# Patient Record
Sex: MALE | Race: WHITE | Employment: OTHER | URBAN - METROPOLITAN AREA
[De-identification: names, ages, dates, MRNs, and addresses within clinical notes are randomized per-mention and may not be internally consistent; named-entity substitution may affect disease eponyms.]

---

## 2017-01-09 ENCOUNTER — TELEPHONE (OUTPATIENT)
Dept: ENT CLINIC | Age: 82
End: 2017-01-09

## 2017-01-09 DIAGNOSIS — J01.80 ACUTE NON-RECURRENT SINUSITIS OF OTHER SINUS: Primary | ICD-10-CM

## 2017-01-09 RX ORDER — AMOXICILLIN 500 MG/1
500 CAPSULE ORAL 3 TIMES DAILY
Qty: 30 CAPSULE | Refills: 1 | Status: SHIPPED | OUTPATIENT
Start: 2017-01-09 | End: 2017-01-19

## 2017-01-23 ENCOUNTER — TELEPHONE (OUTPATIENT)
Dept: ENT CLINIC | Age: 82
End: 2017-01-23

## 2017-03-06 ENCOUNTER — OFFICE VISIT (OUTPATIENT)
Dept: DERMATOLOGY | Age: 82
End: 2017-03-06

## 2017-03-06 DIAGNOSIS — L57.0 AK (ACTINIC KERATOSIS): Primary | ICD-10-CM

## 2017-03-06 DIAGNOSIS — D69.2 SOLAR PURPURA (HCC): ICD-10-CM

## 2017-03-06 DIAGNOSIS — L85.9 HYPERKERATOSIS OF SOLE: ICD-10-CM

## 2017-03-06 PROCEDURE — 99213 OFFICE O/P EST LOW 20 MIN: CPT | Performed by: DERMATOLOGY

## 2017-03-06 PROCEDURE — 17003 DESTRUCT PREMALG LES 2-14: CPT | Performed by: DERMATOLOGY

## 2017-03-06 PROCEDURE — 17000 DESTRUCT PREMALG LESION: CPT | Performed by: DERMATOLOGY

## 2017-03-06 RX ORDER — SOLIFENACIN SUCCINATE 5 MG/1
5 TABLET, FILM COATED ORAL DAILY
COMMUNITY
End: 2017-11-20

## 2017-03-17 DIAGNOSIS — I10 ESSENTIAL HYPERTENSION, BENIGN: ICD-10-CM

## 2017-03-17 RX ORDER — LISINOPRIL 5 MG/1
TABLET ORAL
Qty: 90 TABLET | Refills: 1 | Status: SHIPPED | OUTPATIENT
Start: 2017-03-17 | End: 2017-07-03 | Stop reason: SDUPTHER

## 2017-05-04 ENCOUNTER — PATIENT MESSAGE (OUTPATIENT)
Dept: FAMILY MEDICINE CLINIC | Age: 82
End: 2017-05-04

## 2017-05-04 DIAGNOSIS — E78.00 PURE HYPERCHOLESTEROLEMIA: ICD-10-CM

## 2017-05-04 DIAGNOSIS — I10 ESSENTIAL HYPERTENSION, BENIGN: Primary | ICD-10-CM

## 2017-05-12 DIAGNOSIS — I10 ESSENTIAL HYPERTENSION, BENIGN: ICD-10-CM

## 2017-05-12 DIAGNOSIS — E78.00 PURE HYPERCHOLESTEROLEMIA: ICD-10-CM

## 2017-05-12 LAB
A/G RATIO: 1.6 (ref 1.1–2.2)
ALBUMIN SERPL-MCNC: 4.1 G/DL (ref 3.4–5)
ALP BLD-CCNC: 54 U/L (ref 40–129)
ALT SERPL-CCNC: 9 U/L (ref 10–40)
ANION GAP SERPL CALCULATED.3IONS-SCNC: 11 MMOL/L (ref 3–16)
AST SERPL-CCNC: 18 U/L (ref 15–37)
BILIRUB SERPL-MCNC: 0.6 MG/DL (ref 0–1)
BUN BLDV-MCNC: 19 MG/DL (ref 7–20)
CALCIUM SERPL-MCNC: 9.8 MG/DL (ref 8.3–10.6)
CHLORIDE BLD-SCNC: 104 MMOL/L (ref 99–110)
CHOLESTEROL, TOTAL: 180 MG/DL (ref 0–199)
CO2: 27 MMOL/L (ref 21–32)
CREAT SERPL-MCNC: 0.8 MG/DL (ref 0.8–1.3)
GFR AFRICAN AMERICAN: >60
GFR NON-AFRICAN AMERICAN: >60
GLOBULIN: 2.6 G/DL
GLUCOSE BLD-MCNC: 94 MG/DL (ref 70–99)
HDLC SERPL-MCNC: 75 MG/DL (ref 40–60)
LDL CHOLESTEROL CALCULATED: 85 MG/DL
POTASSIUM SERPL-SCNC: 4.6 MMOL/L (ref 3.5–5.1)
SODIUM BLD-SCNC: 142 MMOL/L (ref 136–145)
TOTAL PROTEIN: 6.7 G/DL (ref 6.4–8.2)
TRIGL SERPL-MCNC: 102 MG/DL (ref 0–150)
TSH REFLEX: 2.9 UIU/ML (ref 0.27–4.2)
VLDLC SERPL CALC-MCNC: 20 MG/DL

## 2017-06-09 ENCOUNTER — OFFICE VISIT (OUTPATIENT)
Dept: FAMILY MEDICINE CLINIC | Age: 82
End: 2017-06-09

## 2017-06-09 ENCOUNTER — HOSPITAL ENCOUNTER (OUTPATIENT)
Dept: OTHER | Age: 82
Discharge: HOME | End: 2017-06-09
Attending: FAMILY MEDICINE | Admitting: FAMILY MEDICINE

## 2017-06-09 VITALS
HEART RATE: 69 BPM | WEIGHT: 168.4 LBS | SYSTOLIC BLOOD PRESSURE: 133 MMHG | DIASTOLIC BLOOD PRESSURE: 70 MMHG | BODY MASS INDEX: 26.38 KG/M2 | RESPIRATION RATE: 16 BRPM | TEMPERATURE: 98.2 F

## 2017-06-09 DIAGNOSIS — S50.11XA CONTUSION OF RIGHT FOREARM, INITIAL ENCOUNTER: Primary | ICD-10-CM

## 2017-06-09 DIAGNOSIS — S50.11XA CONTUSION OF RIGHT FOREARM, INITIAL ENCOUNTER: ICD-10-CM

## 2017-06-09 PROCEDURE — 99213 OFFICE O/P EST LOW 20 MIN: CPT | Performed by: FAMILY MEDICINE

## 2017-06-30 DIAGNOSIS — I10 ESSENTIAL HYPERTENSION, BENIGN: ICD-10-CM

## 2017-07-03 RX ORDER — LISINOPRIL 5 MG/1
TABLET ORAL
Qty: 90 TABLET | Refills: 1 | Status: SHIPPED | OUTPATIENT
Start: 2017-07-03 | End: 2018-03-12 | Stop reason: SDUPTHER

## 2017-07-03 RX ORDER — SIMVASTATIN 20 MG
20 TABLET ORAL NIGHTLY
Qty: 90 TABLET | Refills: 1 | Status: SHIPPED | OUTPATIENT
Start: 2017-07-03 | End: 2018-01-11 | Stop reason: SDUPTHER

## 2017-09-12 ENCOUNTER — TELEPHONE (OUTPATIENT)
Dept: FAMILY MEDICINE CLINIC | Age: 82
End: 2017-09-12

## 2017-09-19 ENCOUNTER — NURSE ONLY (OUTPATIENT)
Dept: FAMILY MEDICINE CLINIC | Age: 82
End: 2017-09-19

## 2017-09-19 VITALS — TEMPERATURE: 98.3 F

## 2017-09-19 DIAGNOSIS — Z23 NEED FOR INFLUENZA VACCINATION: Primary | ICD-10-CM

## 2017-09-19 PROCEDURE — 90662 IIV NO PRSV INCREASED AG IM: CPT | Performed by: FAMILY MEDICINE

## 2017-09-19 PROCEDURE — G0008 ADMIN INFLUENZA VIRUS VAC: HCPCS | Performed by: FAMILY MEDICINE

## 2017-11-20 ENCOUNTER — OFFICE VISIT (OUTPATIENT)
Dept: FAMILY MEDICINE CLINIC | Age: 82
End: 2017-11-20

## 2017-11-20 VITALS
TEMPERATURE: 95.7 F | HEIGHT: 67 IN | OXYGEN SATURATION: 99 % | RESPIRATION RATE: 14 BRPM | WEIGHT: 170.6 LBS | SYSTOLIC BLOOD PRESSURE: 138 MMHG | DIASTOLIC BLOOD PRESSURE: 72 MMHG | BODY MASS INDEX: 26.78 KG/M2 | HEART RATE: 62 BPM

## 2017-11-20 DIAGNOSIS — I10 ESSENTIAL HYPERTENSION, BENIGN: ICD-10-CM

## 2017-11-20 DIAGNOSIS — E78.00 PURE HYPERCHOLESTEROLEMIA: ICD-10-CM

## 2017-11-20 DIAGNOSIS — N40.1 BENIGN NON-NODULAR PROSTATIC HYPERPLASIA WITH LOWER URINARY TRACT SYMPTOMS: ICD-10-CM

## 2017-11-20 DIAGNOSIS — N32.81 OVERACTIVE BLADDER: ICD-10-CM

## 2017-11-20 DIAGNOSIS — Z00.00 WELL ADULT EXAM: Primary | ICD-10-CM

## 2017-11-20 LAB
A/G RATIO: 1.6 (ref 1.1–2.2)
ALBUMIN SERPL-MCNC: 4.4 G/DL (ref 3.4–5)
ALP BLD-CCNC: 58 U/L (ref 40–129)
ALT SERPL-CCNC: 8 U/L (ref 10–40)
ANION GAP SERPL CALCULATED.3IONS-SCNC: 15 MMOL/L (ref 3–16)
AST SERPL-CCNC: 18 U/L (ref 15–37)
BILIRUB SERPL-MCNC: 0.8 MG/DL (ref 0–1)
BILIRUBIN, POC: NEGATIVE
BLOOD URINE, POC: ABNORMAL
BUN BLDV-MCNC: 13 MG/DL (ref 7–20)
CALCIUM SERPL-MCNC: 9.7 MG/DL (ref 8.3–10.6)
CHLORIDE BLD-SCNC: 102 MMOL/L (ref 99–110)
CHOLESTEROL, TOTAL: 176 MG/DL (ref 0–199)
CLARITY, POC: CLEAR
CO2: 25 MMOL/L (ref 21–32)
COLOR, POC: YELLOW
CREAT SERPL-MCNC: 0.9 MG/DL (ref 0.8–1.3)
GFR AFRICAN AMERICAN: >60
GFR NON-AFRICAN AMERICAN: >60
GLOBULIN: 2.7 G/DL
GLUCOSE BLD-MCNC: 92 MG/DL (ref 70–99)
GLUCOSE URINE, POC: NEGATIVE
HDLC SERPL-MCNC: 83 MG/DL (ref 40–60)
KETONES, POC: NEGATIVE
LDL CHOLESTEROL CALCULATED: 76 MG/DL
LEUKOCYTE EST, POC: NEGATIVE
NITRITE, POC: NEGATIVE
PH, POC: 7
POTASSIUM SERPL-SCNC: 4.8 MMOL/L (ref 3.5–5.1)
PROTEIN, POC: ABNORMAL
SODIUM BLD-SCNC: 142 MMOL/L (ref 136–145)
SPECIFIC GRAVITY, POC: 1.01
TOTAL PROTEIN: 7.1 G/DL (ref 6.4–8.2)
TRIGL SERPL-MCNC: 87 MG/DL (ref 0–150)
TSH REFLEX: 2.49 UIU/ML (ref 0.27–4.2)
UROBILINOGEN, POC: 0.2
VLDLC SERPL CALC-MCNC: 17 MG/DL

## 2017-11-20 PROCEDURE — 81002 URINALYSIS NONAUTO W/O SCOPE: CPT | Performed by: FAMILY MEDICINE

## 2017-11-20 PROCEDURE — G0439 PPPS, SUBSEQ VISIT: HCPCS | Performed by: FAMILY MEDICINE

## 2017-11-20 RX ORDER — TROSPIUM CHLORIDE 20 MG/1
20 TABLET, FILM COATED ORAL 2 TIMES DAILY
Qty: 180 TABLET | Refills: 0
Start: 2017-11-20 | End: 2018-03-12 | Stop reason: SDUPTHER

## 2017-11-20 ASSESSMENT — PATIENT HEALTH QUESTIONNAIRE - PHQ9
2. FEELING DOWN, DEPRESSED OR HOPELESS: 0
1. LITTLE INTEREST OR PLEASURE IN DOING THINGS: 0
SUM OF ALL RESPONSES TO PHQ9 QUESTIONS 1 & 2: 0
SUM OF ALL RESPONSES TO PHQ QUESTIONS 1-9: 0

## 2017-11-20 ASSESSMENT — ENCOUNTER SYMPTOMS: CONSTIPATION: 1

## 2017-11-20 NOTE — PROGRESS NOTES
05/12/2017    LDLCALC 57 05/24/2016    LDLCALC 70 11/10/2015     Lab Results   Component Value Date    LABVLDL 20 05/12/2017    LABVLDL 15 05/24/2016    LABVLDL 17 11/10/2015     No results found for: [facility]   Lab Results   Component Value Date    PSA 1.05 11/28/2012    PSADIA 1.13 12/02/2013      Objective:   Physical Exam   Constitutional: He appears well-developed and well-nourished. HENT:   Head: Normocephalic and atraumatic. Right Ear: Tympanic membrane, external ear and ear canal normal.   Left Ear: Tympanic membrane, external ear and ear canal normal.   Nose: Nose normal.   Mouth/Throat: Uvula is midline, oropharynx is clear and moist and mucous membranes are normal. Normal dentition. No oropharyngeal exudate. Eyes: Conjunctivae, EOM and lids are normal. Pupils are equal, round, and reactive to light. No scleral icterus. Fundoscopic exam:       The right eye shows no arteriolar narrowing and no AV nicking. The left eye shows no arteriolar narrowing and no AV nicking. Neck: Normal range of motion. Neck supple. Carotid bruit is not present. No tracheal deviation present. No thyroid mass and no thyromegaly present. Cardiovascular: Normal rate, regular rhythm, normal heart sounds and intact distal pulses. Exam reveals no gallop and no friction rub. No murmur heard. Pulses:       Carotid pulses are 2+ on the right side, and 2+ on the left side. Femoral pulses are 2+ on the right side, and 2+ on the left side. Dorsalis pedis pulses are 2+ on the right side, and 2+ on the left side. Posterior tibial pulses are 2+ on the right side, and 2+ on the left side. Pulmonary/Chest: Effort normal and breath sounds normal. Right breast exhibits no inverted nipple and no mass. Left breast exhibits no inverted nipple and no mass. Abdominal: Soft. Bowel sounds are normal. He exhibits no mass. There is no hepatosplenomegaly. There is no tenderness. No hernia.  Hernia confirmed negative in the ventral area, confirmed negative in the right inguinal area and confirmed negative in the left inguinal area. Genitourinary: Rectum normal, testes normal and penis normal. Prostate is enlarged. Prostate is not tender. Genitourinary Comments: No prostate mass   Lymphadenopathy:        Head (right side): No submandibular adenopathy present. Head (left side): No submandibular adenopathy present. He has no cervical adenopathy. Right: No inguinal and no supraclavicular adenopathy present. Left: No inguinal and no supraclavicular adenopathy present. Neurological: He is alert. He has normal strength. Gait normal.   Reflex Scores:       Patellar reflexes are 2+ on the right side and 2+ on the left side. Skin: Skin is warm and dry. No rash noted. No cyanosis. Nails show no clubbing. Psychiatric: He has a normal mood and affect. His behavior is normal. Judgment normal. Cognition and memory are normal.       Assessment:      1. Well adult exam  Exercise, diet and healthy weight addressed. The natural history of prostate cancer and ongoing controversy regarding screening and potential treatment outcomes of prostate cancer has been discussed with the patient. The meaning of a false positive PSA and a false negative PSA has been discussed. He indicates understanding of the limitations of this screening test and wishes  Not to proceed with screening PSA testing. DT every 10 years  Influenza vaccine annually     2. Essential hypertension, benign  At goal < 140/90   3. Pure hypercholesterolemia  At goal with statin. 4. Overactive bladder  Well controlled. 5. Benign non-nodular prostatic hyperplasia with lower urinary tract symptoms  POCT Urinalysis no Micro  Symptoms improved          Plan:      Side effects of current medications reviewed and questions answered. . Follow up in 1 year or prn.

## 2017-11-23 ENCOUNTER — PATIENT MESSAGE (OUTPATIENT)
Dept: FAMILY MEDICINE CLINIC | Age: 82
End: 2017-11-23

## 2017-11-23 DIAGNOSIS — K59.04 CHRONIC IDIOPATHIC CONSTIPATION: Primary | ICD-10-CM

## 2018-02-19 ENCOUNTER — OFFICE VISIT (OUTPATIENT)
Dept: ENT CLINIC | Age: 83
End: 2018-02-19

## 2018-02-19 VITALS
HEIGHT: 70 IN | HEART RATE: 80 BPM | TEMPERATURE: 96.4 F | WEIGHT: 164 LBS | SYSTOLIC BLOOD PRESSURE: 138 MMHG | BODY MASS INDEX: 23.48 KG/M2 | DIASTOLIC BLOOD PRESSURE: 76 MMHG

## 2018-02-19 DIAGNOSIS — J01.90 ACUTE RHINOSINUSITIS: Primary | ICD-10-CM

## 2018-02-19 PROCEDURE — 99213 OFFICE O/P EST LOW 20 MIN: CPT | Performed by: OTOLARYNGOLOGY

## 2018-02-19 RX ORDER — AMOXICILLIN 500 MG/1
500 CAPSULE ORAL 3 TIMES DAILY
Qty: 30 CAPSULE | Refills: 1 | Status: SHIPPED | OUTPATIENT
Start: 2018-02-19 | End: 2018-03-01

## 2018-02-19 NOTE — PROGRESS NOTES
FOLLOW UP VISIT:      INTERIM HISTORY: URI symptoms for 1-2 weeks. Has cough. Cough is productive of discolored mucus. A little blood this AM.  Has throat pain. Some nasal obstruction at night. Some right ear fullness. Hearing right ear is muffled. Seemed to be improving but now worse again. PAST MEDICAL HISTORY:   History   Smoking Status    Former Smoker    Years: 5.00    Types: Pipe    Quit date: 1/1/1965   Smokeless Tobacco    Never Used                                                    History   Alcohol Use    1.8 oz/week    3 Glasses of wine, 4 Standard drinks or equivalent per week     Comment: in a week                                                    Current Outpatient Prescriptions:     simvastatin (ZOCOR) 20 MG tablet, TAKE 1 TABLET NIGHTLY, Disp: 90 tablet, Rfl: 2    linaclotide (LINZESS) 145 MCG capsule, Take 1 capsule by mouth every morning (before breakfast), Disp: 30 capsule, Rfl: 5    trospium (SANCTURA) 20 MG tablet, Take 1 tablet by mouth 2 times daily, Disp: 180 tablet, Rfl: 0    lisinopril (PRINIVIL;ZESTRIL) 5 MG tablet, TAKE 1 TABLET DAILY, Disp: 90 tablet, Rfl: 1    clobetasol (TEMOVATE) 0.05 % cream, Apply topically 2 times daily. , Disp: 30 g, Rfl: 0    Calcium Citrate (CITRACAL PO), Take by mouth, Disp: , Rfl:     BIOTIN 5000 PO, Take by mouth, Disp: , Rfl:     finasteride (PROSCAR) 5 MG tablet, , Disp: , Rfl: 4    Urea (CARMOL) 40 % cream, Apply to the feet 1 to 2 times daily. , Disp: 85 g, Rfl: 2    docusate sodium (COLACE) 100 MG capsule, Take 100 mg by mouth 2 times daily. , Disp: , Rfl:     Calcium Carbonate-Vit D-Min (CALCIUM 1200 PO), Take 1 tablet by mouth Slow acting caltrate, Disp: , Rfl:                                                  Past Medical History:   Diagnosis Date    Arthritis     MILD    Bunion 7/29/2011    Burn     RT wrist    Cataract 5/12/2014    Essential hypertension, benign 12/2/2013    Hammer toe 7/29/2011    Hyperlipidemia  Inguinal hernia 7/29/2011                                                    Past Surgical History:   Procedure Laterality Date    CATARACT REMOVAL Bilateral May '14    DENTAL SURGERY      EYE SURGERY      HERNIA REPAIR  1980    Left inguinal    INGUINAL HERNIA REPAIR      LEFT    INGUINAL HERNIA REPAIR  08/24/2011    Right    VARICOSE VEIN SURGERY  2008    VARICOSE VEIN SURGERY      BILATERAL LEGS         REVIEW OF SYSTEMS:  Al pertinent positive and negative findings included in HPI. Otherwise, all other systems are reviewed and are negative    PHYSICAL EXAMINATION:   GENERAL: wdwn- no acute distress  COMMUNICATION :  Normal voice  MENTAL STATUS:  Normal  HEAD AND FACE:  Normal  EXTERNAL EARS AND NOSE:  Normal  FACIAL MUSCLES:  Normal  FACE PALPATION:  Negative  OTOSCOPY:  Tympanic membranes and middle ears normal.  TUNING FORKS:  Rinne ++ Rubio midline at 512 Hz  INTRANASAL:  Septum midline, turbinates normal, meati clear. Purulent secretions in nares  LIPS, TEETH, GINGIVA:  Normal mucosa  PHARYNX:  Normal  NECK:  No masses or adenopathy  SALIVARY GLANDS:  Normal  THYROID:  Normal    IMPRESSION: Acute rhinosinusitis. PLAN: Amoxicillin 500 mg 3 times a day and Medrol Dosepak prescribed. FOLLOW-UP: 10 days if symptoms do not resolve.

## 2018-03-08 ENCOUNTER — OFFICE VISIT (OUTPATIENT)
Dept: DERMATOLOGY | Age: 83
End: 2018-03-08

## 2018-03-08 DIAGNOSIS — L82.1 SK (SEBORRHEIC KERATOSIS): ICD-10-CM

## 2018-03-08 DIAGNOSIS — D69.2 SOLAR PURPURA (HCC): Primary | ICD-10-CM

## 2018-03-08 DIAGNOSIS — L57.0 AK (ACTINIC KERATOSIS): ICD-10-CM

## 2018-03-08 PROCEDURE — 17003 DESTRUCT PREMALG LES 2-14: CPT | Performed by: DERMATOLOGY

## 2018-03-08 PROCEDURE — 17000 DESTRUCT PREMALG LESION: CPT | Performed by: DERMATOLOGY

## 2018-03-08 PROCEDURE — 99213 OFFICE O/P EST LOW 20 MIN: CPT | Performed by: DERMATOLOGY

## 2018-03-12 DIAGNOSIS — I10 ESSENTIAL HYPERTENSION, BENIGN: ICD-10-CM

## 2018-03-12 RX ORDER — TROSPIUM CHLORIDE 20 MG/1
20 TABLET, FILM COATED ORAL 2 TIMES DAILY
Qty: 180 TABLET | Refills: 1 | Status: SHIPPED | OUTPATIENT
Start: 2018-03-12 | End: 2018-11-27 | Stop reason: ALTCHOICE

## 2018-03-12 RX ORDER — LISINOPRIL 5 MG/1
TABLET ORAL
Qty: 90 TABLET | Refills: 1 | Status: SHIPPED | OUTPATIENT
Start: 2018-03-12 | End: 2018-09-14 | Stop reason: SDUPTHER

## 2018-04-20 ENCOUNTER — PATIENT MESSAGE (OUTPATIENT)
Dept: FAMILY MEDICINE CLINIC | Age: 83
End: 2018-04-20

## 2018-04-20 RX ORDER — OXYBUTYNIN CHLORIDE 15 MG/1
15 TABLET, EXTENDED RELEASE ORAL DAILY
Qty: 30 TABLET | Refills: 5 | Status: SHIPPED | OUTPATIENT
Start: 2018-04-20 | End: 2018-08-11 | Stop reason: SDUPTHER

## 2018-05-12 ENCOUNTER — PATIENT MESSAGE (OUTPATIENT)
Dept: FAMILY MEDICINE CLINIC | Age: 83
End: 2018-05-12

## 2018-05-14 RX ORDER — FINASTERIDE 5 MG/1
5 TABLET, FILM COATED ORAL DAILY
Qty: 90 TABLET | Refills: 1 | Status: SHIPPED | OUTPATIENT
Start: 2018-05-14 | End: 2018-11-29 | Stop reason: SDUPTHER

## 2018-08-10 ENCOUNTER — PATIENT MESSAGE (OUTPATIENT)
Dept: FAMILY MEDICINE CLINIC | Age: 83
End: 2018-08-10

## 2018-08-10 DIAGNOSIS — K59.04 CHRONIC IDIOPATHIC CONSTIPATION: ICD-10-CM

## 2018-08-11 RX ORDER — OXYBUTYNIN CHLORIDE 15 MG/1
15 TABLET, EXTENDED RELEASE ORAL DAILY
Qty: 90 TABLET | Refills: 2 | Status: SHIPPED | OUTPATIENT
Start: 2018-08-11 | End: 2019-03-23 | Stop reason: SDUPTHER

## 2018-08-11 NOTE — TELEPHONE ENCOUNTER
From: Ines Garcia  To: Camacho Winters MD  Sent: 8/10/2018 3:15 PM EDT  Subject: Prescription Question    I need NEW prescriptions for 90 days from [facility] with refills for Oxybutynin ER 15 mg and Linzess 145 mg

## 2018-09-05 ENCOUNTER — TELEPHONE (OUTPATIENT)
Dept: FAMILY MEDICINE CLINIC | Age: 83
End: 2018-09-05

## 2018-09-05 DIAGNOSIS — I10 ESSENTIAL HYPERTENSION, BENIGN: Primary | ICD-10-CM

## 2018-09-05 DIAGNOSIS — E78.00 PURE HYPERCHOLESTEROLEMIA: ICD-10-CM

## 2018-09-05 NOTE — TELEPHONE ENCOUNTER
Pt scheduled annual physical with PCP on 11/27/18. He is requesting bloodwork orders drafted prior, and requested return call with update to [phone] when complete. Thank you!

## 2018-09-14 DIAGNOSIS — I10 ESSENTIAL HYPERTENSION, BENIGN: ICD-10-CM

## 2018-09-14 RX ORDER — LISINOPRIL 5 MG/1
TABLET ORAL
Qty: 90 TABLET | Refills: 0 | Status: SHIPPED | OUTPATIENT
Start: 2018-09-14 | End: 2019-01-14 | Stop reason: SDUPTHER

## 2018-10-29 RX ORDER — SIMVASTATIN 20 MG
20 TABLET ORAL NIGHTLY
Qty: 90 TABLET | Refills: 0 | Status: SHIPPED | OUTPATIENT
Start: 2018-10-29 | End: 2019-02-04 | Stop reason: SDUPTHER

## 2018-11-20 DIAGNOSIS — E78.00 PURE HYPERCHOLESTEROLEMIA: ICD-10-CM

## 2018-11-20 LAB
A/G RATIO: 1.7 (ref 1.1–2.2)
ALBUMIN SERPL-MCNC: 4.2 G/DL (ref 3.4–5)
ALP BLD-CCNC: 55 U/L (ref 40–129)
ALT SERPL-CCNC: 8 U/L (ref 10–40)
ANION GAP SERPL CALCULATED.3IONS-SCNC: 11 MMOL/L (ref 3–16)
AST SERPL-CCNC: 18 U/L (ref 15–37)
BILIRUB SERPL-MCNC: 0.8 MG/DL (ref 0–1)
BUN BLDV-MCNC: 15 MG/DL (ref 7–20)
CALCIUM SERPL-MCNC: 9.7 MG/DL (ref 8.3–10.6)
CHLORIDE BLD-SCNC: 107 MMOL/L (ref 99–110)
CHOLESTEROL, TOTAL: 160 MG/DL (ref 0–199)
CO2: 27 MMOL/L (ref 21–32)
CREAT SERPL-MCNC: 0.8 MG/DL (ref 0.8–1.3)
GFR AFRICAN AMERICAN: >60
GFR NON-AFRICAN AMERICAN: >60
GLOBULIN: 2.5 G/DL
GLUCOSE BLD-MCNC: 90 MG/DL (ref 70–99)
HDLC SERPL-MCNC: 70 MG/DL (ref 40–60)
LDL CHOLESTEROL CALCULATED: 70 MG/DL
POTASSIUM SERPL-SCNC: 4.5 MMOL/L (ref 3.5–5.1)
SODIUM BLD-SCNC: 145 MMOL/L (ref 136–145)
TOTAL PROTEIN: 6.7 G/DL (ref 6.4–8.2)
TRIGL SERPL-MCNC: 100 MG/DL (ref 0–150)
TSH REFLEX: 3.23 UIU/ML (ref 0.27–4.2)
VLDLC SERPL CALC-MCNC: 20 MG/DL

## 2018-11-27 ENCOUNTER — OFFICE VISIT (OUTPATIENT)
Dept: FAMILY MEDICINE CLINIC | Age: 83
End: 2018-11-27
Payer: MEDICARE

## 2018-11-27 VITALS
DIASTOLIC BLOOD PRESSURE: 78 MMHG | BODY MASS INDEX: 27.03 KG/M2 | OXYGEN SATURATION: 99 % | WEIGHT: 168.2 LBS | SYSTOLIC BLOOD PRESSURE: 140 MMHG | TEMPERATURE: 97.7 F | HEIGHT: 66 IN | HEART RATE: 78 BPM | RESPIRATION RATE: 14 BRPM

## 2018-11-27 DIAGNOSIS — I10 ESSENTIAL HYPERTENSION, BENIGN: ICD-10-CM

## 2018-11-27 DIAGNOSIS — K14.9 TONGUE IRRITATION: ICD-10-CM

## 2018-11-27 DIAGNOSIS — E78.00 PURE HYPERCHOLESTEROLEMIA: ICD-10-CM

## 2018-11-27 DIAGNOSIS — Z00.00 WELL ADULT EXAM: Primary | ICD-10-CM

## 2018-11-27 DIAGNOSIS — N40.1 BENIGN NON-NODULAR PROSTATIC HYPERPLASIA WITH LOWER URINARY TRACT SYMPTOMS: ICD-10-CM

## 2018-11-27 PROCEDURE — G0439 PPPS, SUBSEQ VISIT: HCPCS | Performed by: FAMILY MEDICINE

## 2018-11-27 RX ORDER — TAMSULOSIN HYDROCHLORIDE 0.4 MG/1
0.4 CAPSULE ORAL DAILY
Qty: 90 CAPSULE | Refills: 1 | Status: SHIPPED | OUTPATIENT
Start: 2018-11-27 | End: 2020-12-04

## 2018-11-27 RX ORDER — FLUTICASONE PROPIONATE 50 MCG
1 SPRAY, SUSPENSION (ML) NASAL DAILY
COMMUNITY
End: 2021-11-09

## 2018-11-27 ASSESSMENT — PATIENT HEALTH QUESTIONNAIRE - PHQ9
SUM OF ALL RESPONSES TO PHQ QUESTIONS 1-9: 0
1. LITTLE INTEREST OR PLEASURE IN DOING THINGS: 0
SUM OF ALL RESPONSES TO PHQ9 QUESTIONS 1 & 2: 0
SUM OF ALL RESPONSES TO PHQ QUESTIONS 1-9: 0
2. FEELING DOWN, DEPRESSED OR HOPELESS: 0

## 2018-11-27 ASSESSMENT — ENCOUNTER SYMPTOMS
CHEST TIGHTNESS: 0
ANAL BLEEDING: 0
TROUBLE SWALLOWING: 0
CONSTIPATION: 0
COUGH: 0
ABDOMINAL PAIN: 0
BLOOD IN STOOL: 0
SHORTNESS OF BREATH: 0
VOICE CHANGE: 0
RHINORRHEA: 0
DIARRHEA: 0

## 2018-11-27 NOTE — PROGRESS NOTES
Subjective:      Patient ID: Janki Yates 80 y.o. male     HPI    Janki Yates is here for his annual wellness exam.    Diet: eats a healthy diet  Exercise: bikes in the good weather. Goes to the exercise room where he lives sporadically. Walking 2 1/2 miles once a week. Sleeps well     Health Maintenance   Topic Date Due    Shingles Vaccine (1 of 2 - 2 Dose Series) 01/01/2008    Potassium monitoring  11/20/2019    Creatinine monitoring  11/20/2019    DTaP/Tdap/Td vaccine (2 - Td) 10/09/2026    Flu vaccine  Completed    Pneumococcal low/med risk  Completed       Outpatient Prescriptions Marked as Taking for the 11/27/18 encounter (Office Visit) with Main Monterroso MD   Medication Sig Dispense Refill    fluticasone (FLONASE) 50 MCG/ACT nasal spray 1 spray by Each Nare route daily      simvastatin (ZOCOR) 20 MG tablet Take 1 tablet by mouth nightly 90 tablet 0    lisinopril (PRINIVIL;ZESTRIL) 5 MG tablet TAKE 1 TABLET DAILY 90 tablet 0    linaclotide (LINZESS) 145 MCG capsule TAKE 1 CAPSULE BY MOUTH EVERY MORNING BEFORE BREAKFAST 90 capsule 2    oxybutynin (DITROPAN XL) 15 MG extended release tablet Take 1 tablet by mouth daily 90 tablet 2    finasteride (PROSCAR) 5 MG tablet Take 1 tablet by mouth daily 90 tablet 1    clobetasol (TEMOVATE) 0.05 % cream Apply topically 2 times daily.  30 g 0    BIOTIN 5000 PO Take by mouth          Allergies   Allergen Reactions    Percodan [Oxycodone-Aspirin] Nausea Only        Patient Active Problem List   Diagnosis    Overactive bladder    Pure hypercholesterolemia    Lichen planus    Essential hypertension, benign    Angioma    Dry mouth    Benign non-nodular prostatic hyperplasia with lower urinary tract symptoms    Eczema, dyshidrotic       Past Medical History:   Diagnosis Date    Arthritis     MILD    Bunion 7/29/2011    Burn     RT wrist    Cataract 5/12/2014    Essential hypertension, benign 12/2/2013    Hammer toe 7/29/2011    Hyperlipidemia     Inguinal hernia 7/29/2011       Past Surgical History:   Procedure Laterality Date    CATARACT REMOVAL Bilateral May '14    DENTAL SURGERY      EYE SURGERY      HERNIA REPAIR  1980    Left inguinal    INGUINAL HERNIA REPAIR      LEFT    INGUINAL HERNIA REPAIR  08/24/2011    Right    VARICOSE VEIN SURGERY  2008    VARICOSE VEIN SURGERY      BILATERAL LEGS        Social History   Substance Use Topics    Smoking status: Former Smoker     Years: 5.00     Types: Pipe     Quit date: 1/1/1965    Smokeless tobacco: Never Used    Alcohol use 1.8 oz/week     3 Glasses of wine, 4 Standard drinks or equivalent per week      Comment: in a week        Family History   Problem Relation Age of Onset    Breast Cancer Mother 52    Heart Attack Father 72    Lung Cancer Sister         Review of Systems  Review of Systems   Constitutional: Negative for fatigue and unexpected weight change. HENT: Positive for hearing loss and tinnitus. Negative for congestion, nosebleeds, rhinorrhea, trouble swallowing and voice change. 3 - 4 weeks sore area under his tongue in the back on the right. [place] with some foods such wine or if eats/drinks something very hot or very cold. Not bothering him today. Comes and goes. He thinks he may have bit his tongue. Tinnitus if has allergies. Hearing loss related to wax - Dr. Rafy Kemp cleared the wax and hearing loss resolved. Eyes: Negative for visual disturbance. Respiratory: Negative for cough, chest tightness and shortness of breath. Cardiovascular: Negative for chest pain, palpitations and leg swelling. Gastrointestinal: Negative for abdominal pain, anal bleeding, blood in stool, constipation and diarrhea. Constipation is well controlled with Linzess QOD. Drinking plenty of water helps. Endocrine: Negative for polydipsia and polyuria.    Genitourinary: Negative for difficulty urinating, discharge, dysuria, frequency, hematuria and

## 2018-11-30 RX ORDER — FINASTERIDE 5 MG/1
TABLET, FILM COATED ORAL
Qty: 90 TABLET | Refills: 1 | Status: SHIPPED | OUTPATIENT
Start: 2018-11-30 | End: 2019-06-18 | Stop reason: SDUPTHER

## 2019-01-14 DIAGNOSIS — I10 ESSENTIAL HYPERTENSION, BENIGN: ICD-10-CM

## 2019-01-14 RX ORDER — LISINOPRIL 5 MG/1
TABLET ORAL
Qty: 90 TABLET | Refills: 0 | Status: SHIPPED | OUTPATIENT
Start: 2019-01-14 | End: 2019-03-23 | Stop reason: SDUPTHER

## 2019-02-04 RX ORDER — SIMVASTATIN 20 MG
20 TABLET ORAL NIGHTLY
Qty: 90 TABLET | Refills: 1 | Status: SHIPPED | OUTPATIENT
Start: 2019-02-04 | End: 2019-09-03 | Stop reason: SDUPTHER

## 2019-03-05 ENCOUNTER — OFFICE VISIT (OUTPATIENT)
Dept: DERMATOLOGY | Age: 84
End: 2019-03-05
Payer: MEDICARE

## 2019-03-05 DIAGNOSIS — L57.0 ACTINIC KERATOSIS: ICD-10-CM

## 2019-03-05 DIAGNOSIS — L82.1 SK (SEBORRHEIC KERATOSIS): Primary | ICD-10-CM

## 2019-03-05 PROCEDURE — 99213 OFFICE O/P EST LOW 20 MIN: CPT | Performed by: DERMATOLOGY

## 2019-03-06 ENCOUNTER — PATIENT MESSAGE (OUTPATIENT)
Dept: DERMATOLOGY | Age: 84
End: 2019-03-06

## 2019-06-18 ENCOUNTER — OFFICE VISIT (OUTPATIENT)
Dept: FAMILY MEDICINE CLINIC | Age: 84
End: 2019-06-18
Payer: MEDICARE

## 2019-06-18 VITALS
OXYGEN SATURATION: 98 % | HEART RATE: 74 BPM | SYSTOLIC BLOOD PRESSURE: 131 MMHG | TEMPERATURE: 97.5 F | DIASTOLIC BLOOD PRESSURE: 74 MMHG | WEIGHT: 162.2 LBS | BODY MASS INDEX: 25.98 KG/M2

## 2019-06-18 DIAGNOSIS — J01.00 ACUTE NON-RECURRENT MAXILLARY SINUSITIS: ICD-10-CM

## 2019-06-18 DIAGNOSIS — R63.4 WEIGHT LOSS: ICD-10-CM

## 2019-06-18 DIAGNOSIS — R63.4 WEIGHT LOSS: Primary | ICD-10-CM

## 2019-06-18 DIAGNOSIS — N40.1 BENIGN NON-NODULAR PROSTATIC HYPERPLASIA WITH LOWER URINARY TRACT SYMPTOMS: ICD-10-CM

## 2019-06-18 LAB
A/G RATIO: 1.6 (ref 1.1–2.2)
ALBUMIN SERPL-MCNC: 4.1 G/DL (ref 3.4–5)
ALP BLD-CCNC: 66 U/L (ref 40–129)
ALT SERPL-CCNC: 8 U/L (ref 10–40)
ANION GAP SERPL CALCULATED.3IONS-SCNC: 11 MMOL/L (ref 3–16)
AST SERPL-CCNC: 16 U/L (ref 15–37)
BASOPHILS ABSOLUTE: 0 K/UL (ref 0–0.2)
BASOPHILS RELATIVE PERCENT: 0.2 %
BILIRUB SERPL-MCNC: 0.4 MG/DL (ref 0–1)
BUN BLDV-MCNC: 15 MG/DL (ref 7–20)
CALCIUM SERPL-MCNC: 9.9 MG/DL (ref 8.3–10.6)
CHLORIDE BLD-SCNC: 102 MMOL/L (ref 99–110)
CO2: 28 MMOL/L (ref 21–32)
CREAT SERPL-MCNC: 0.9 MG/DL (ref 0.8–1.3)
EOSINOPHILS ABSOLUTE: 0 K/UL (ref 0–0.6)
EOSINOPHILS RELATIVE PERCENT: 0.8 %
GFR AFRICAN AMERICAN: >60
GFR NON-AFRICAN AMERICAN: >60
GLOBULIN: 2.6 G/DL
GLUCOSE BLD-MCNC: 95 MG/DL (ref 70–99)
HCT VFR BLD CALC: 41.6 % (ref 40.5–52.5)
HEMOGLOBIN: 14.1 G/DL (ref 13.5–17.5)
LYMPHOCYTES ABSOLUTE: 0.7 K/UL (ref 1–5.1)
LYMPHOCYTES RELATIVE PERCENT: 12.6 %
MCH RBC QN AUTO: 32.7 PG (ref 26–34)
MCHC RBC AUTO-ENTMCNC: 33.8 G/DL (ref 31–36)
MCV RBC AUTO: 96.7 FL (ref 80–100)
MONOCYTES ABSOLUTE: 0.5 K/UL (ref 0–1.3)
MONOCYTES RELATIVE PERCENT: 9.3 %
NEUTROPHILS ABSOLUTE: 4.5 K/UL (ref 1.7–7.7)
NEUTROPHILS RELATIVE PERCENT: 77.1 %
PDW BLD-RTO: 14.6 % (ref 12.4–15.4)
PLATELET # BLD: 235 K/UL (ref 135–450)
PMV BLD AUTO: 9.4 FL (ref 5–10.5)
POTASSIUM SERPL-SCNC: 4.4 MMOL/L (ref 3.5–5.1)
RBC # BLD: 4.3 M/UL (ref 4.2–5.9)
SODIUM BLD-SCNC: 141 MMOL/L (ref 136–145)
TOTAL PROTEIN: 6.7 G/DL (ref 6.4–8.2)
TSH REFLEX: 1.47 UIU/ML (ref 0.27–4.2)
WBC # BLD: 5.8 K/UL (ref 4–11)

## 2019-06-18 PROCEDURE — 99214 OFFICE O/P EST MOD 30 MIN: CPT | Performed by: FAMILY MEDICINE

## 2019-06-18 RX ORDER — AMOXICILLIN AND CLAVULANATE POTASSIUM 875; 125 MG/1; MG/1
1 TABLET, FILM COATED ORAL 2 TIMES DAILY
Qty: 20 TABLET | Refills: 0 | Status: SHIPPED | OUTPATIENT
Start: 2019-06-18 | End: 2019-06-28

## 2019-06-18 RX ORDER — FINASTERIDE 5 MG/1
TABLET, FILM COATED ORAL
Qty: 90 TABLET | Refills: 1 | Status: SHIPPED | OUTPATIENT
Start: 2019-06-18 | End: 2019-12-23

## 2019-06-18 ASSESSMENT — ENCOUNTER SYMPTOMS
DIARRHEA: 0
WHEEZING: 0
SHORTNESS OF BREATH: 0
CONSTIPATION: 1

## 2019-06-18 NOTE — PROGRESS NOTES
hyperplasia with lower urinary tract symptoms    Eczema, dyshidrotic       Past Medical History:   Diagnosis Date    Arthritis     MILD    Bunion 2011    Burn     RT wrist    Cataract 2014    Essential hypertension, benign 2013    Hammer toe 2011    Hyperlipidemia     Inguinal hernia 2011       Past Surgical History:   Procedure Laterality Date    CATARACT REMOVAL Bilateral May '14    DENTAL SURGERY      EYE SURGERY      HERNIA REPAIR  1980    Left inguinal    INGUINAL HERNIA REPAIR      LEFT    INGUINAL HERNIA REPAIR  2011    Right    VARICOSE VEIN SURGERY  2008    VARICOSE VEIN SURGERY      BILATERAL LEGS        Family History   Problem Relation Age of Onset    Breast Cancer Mother 52    Heart Attack Father 72    Lung Cancer Sister        Social History     Tobacco Use    Smoking status: Former Smoker     Years: 5.00     Types: Pipe     Last attempt to quit: 1965     Years since quittin.4    Smokeless tobacco: Never Used   Substance Use Topics    Alcohol use: Yes     Alcohol/week: 1.8 oz     Types: 3 Glasses of wine, 4 Standard drinks or equivalent per week     Comment: in a week    Drug use: No            Review of Systems  Review of Systems   Constitutional: Positive for unexpected weight change. Negative for appetite change and fever. Respiratory: Negative for shortness of breath and wheezing. Cardiovascular: Negative for chest pain. Gastrointestinal: Positive for constipation. Negative for diarrhea. Constipation chronic and controlled with Linzess   Endocrine: Negative for cold intolerance, heat intolerance, polydipsia and polyphagia. Genitourinary: Negative for dysuria and hematuria.        + urinary frequency and nocturia. Took Tamulosin for 6 months. Did not help so stopped taking it. Saw urologist - recommended laser surgery of the prostate. Skin: Negative.          .  Seeing Dr. Marek Holliday   Neurological: Negative for dizziness, light-headedness and headaches. Hematological: Negative for adenopathy. Bruises/bleeds easily. Bruises easily, stable   Psychiatric/Behavioral: Negative for dysphoric mood. The patient is not nervous/anxious. Objective:   Physical Exam  Vitals:    06/18/19 1359 06/18/19 1409   BP: (!) 142/68 131/74   Pulse: 74    Temp: 97.5 °F (36.4 °C)    SpO2: 98%    Weight: 162 lb 3.2 oz (73.6 kg)        Physical Exam  NAD    Skin is warm and dry. Well hydrated, no rash. No respiratory distress. Ear exam - bilateral normal, TM intact without perforation or effusion, external canal normal. No significant ceruminosis noted. . Nares boggy with mucoid discharge. Moderate maxillary sinus tenderness. Pharynx normal, no oral lesions. Shotty cervical LNS, neg submandibular and supraclavicular LNS. Chest is clear, no wheezing or rales. Normal symmetric air entry throughout both lung fields. Cardiac regular w/o murmer, gallop. The abdomen is soft without tenderness, guarding, mass, rebound or organomegaly. Bowel sounds are normal. No CVA tenderness or inguinal adenopathy noted. Assessment:       Diagnosis Orders   1. Weight loss  Comprehensive Metabolic Panel    TSH with Reflex    CBC Auto Differential   hx reassuring. Monitor at home    2. Acute non-recurrent maxillary sinusitis  amoxicillin-clavulanate (AUGMENTIN) 875-125 MG per tablet  Side effects of current medications reviewed and questions answered. Call or return to clinic prn if these symptoms worsen or fail to improve as anticipated. 3. Benign non-nodular prostatic hyperplasia with lower urinary tract symptoms  finasteride (PROSCAR) 5 MG tablet  Follow up with Dr. Trinidad Ba as needed          Plan:      Side effects of current medications reviewed and questions answered. Call or return to clinic prn if these symptoms worsen or fail to improve as anticipated.

## 2019-08-19 ENCOUNTER — PATIENT MESSAGE (OUTPATIENT)
Dept: FAMILY MEDICINE CLINIC | Age: 84
End: 2019-08-19

## 2019-08-19 DIAGNOSIS — R63.4 ABNORMAL WEIGHT LOSS: Primary | ICD-10-CM

## 2019-08-27 ENCOUNTER — TELEPHONE (OUTPATIENT)
Dept: FAMILY MEDICINE CLINIC | Age: 84
End: 2019-08-27

## 2019-09-03 ENCOUNTER — OFFICE VISIT (OUTPATIENT)
Dept: FAMILY MEDICINE CLINIC | Age: 84
End: 2019-09-03
Payer: MEDICARE

## 2019-09-03 VITALS
OXYGEN SATURATION: 96 % | WEIGHT: 159.2 LBS | SYSTOLIC BLOOD PRESSURE: 131 MMHG | DIASTOLIC BLOOD PRESSURE: 76 MMHG | RESPIRATION RATE: 12 BRPM | BODY MASS INDEX: 25.5 KG/M2 | HEART RATE: 66 BPM

## 2019-09-03 DIAGNOSIS — E78.00 PURE HYPERCHOLESTEROLEMIA: ICD-10-CM

## 2019-09-03 DIAGNOSIS — R63.4 WEIGHT LOSS: Primary | ICD-10-CM

## 2019-09-03 DIAGNOSIS — K59.04 CHRONIC IDIOPATHIC CONSTIPATION: ICD-10-CM

## 2019-09-03 PROCEDURE — G0008 ADMIN INFLUENZA VIRUS VAC: HCPCS | Performed by: FAMILY MEDICINE

## 2019-09-03 PROCEDURE — 99214 OFFICE O/P EST MOD 30 MIN: CPT | Performed by: FAMILY MEDICINE

## 2019-09-03 PROCEDURE — 90662 IIV NO PRSV INCREASED AG IM: CPT | Performed by: FAMILY MEDICINE

## 2019-09-03 RX ORDER — SIMVASTATIN 20 MG
20 TABLET ORAL NIGHTLY
Qty: 90 TABLET | Refills: 1 | Status: SHIPPED | OUTPATIENT
Start: 2019-09-03 | End: 2020-03-18

## 2019-09-03 RX ORDER — LANOLIN ALCOHOL/MO/W.PET/CERES
1000 CREAM (GRAM) TOPICAL DAILY
COMMUNITY

## 2019-09-03 ASSESSMENT — PATIENT HEALTH QUESTIONNAIRE - PHQ9
1. LITTLE INTEREST OR PLEASURE IN DOING THINGS: 0
SUM OF ALL RESPONSES TO PHQ9 QUESTIONS 1 & 2: 0
SUM OF ALL RESPONSES TO PHQ QUESTIONS 1-9: 0
SUM OF ALL RESPONSES TO PHQ QUESTIONS 1-9: 0
2. FEELING DOWN, DEPRESSED OR HOPELESS: 0

## 2019-09-03 NOTE — PROGRESS NOTES
Current Influenza vaccine VIS given to patient. Influenza consent form/questionnaire completed and signed. Patient responses to  Influenza consent form / questionnaire  reviewed. Vaccine given per protocol. Vaccine Information Sheet, \"Influenza - Inactivated\"  given to Chris Jacobs, or parent/legal guardian of  Chris Jacobs and verbalized understanding. Patient responses:    Have you ever had a reaction to a flu vaccine? No  Are you able to eat eggs without adverse effects? Yes  Do you have any current illness? No  Have you ever had Guillian [place] Syndrome? No    Flu vaccine given per order. Please see immunization tab.
Percodan [Oxycodone-Aspirin] Nausea Only       Patient Active Problem List   Diagnosis    Overactive bladder    Pure hypercholesterolemia    Lichen planus    Essential hypertension, benign    Angioma    Dry mouth    Benign non-nodular prostatic hyperplasia with lower urinary tract symptoms    Eczema, dyshidrotic       Past Medical History:   Diagnosis Date    Arthritis     MILD    Bunion 2011    Burn     RT wrist    Cataract 2014    Essential hypertension, benign 2013    Hammer toe 2011    Hyperlipidemia     Inguinal hernia 2011       Past Surgical History:   Procedure Laterality Date    CATARACT REMOVAL Bilateral May '14    DENTAL SURGERY      EYE SURGERY      HERNIA REPAIR      Left inguinal    INGUINAL HERNIA REPAIR      LEFT    INGUINAL HERNIA REPAIR  2011    Right    VARICOSE VEIN SURGERY  2008    VARICOSE VEIN SURGERY      BILATERAL LEGS        Family History   Problem Relation Age of Onset    Breast Cancer Mother 52    Heart Attack Father 72    Lung Cancer Sister        Social History     Tobacco Use    Smoking status: Former Smoker     Years: 5.00     Types: Pipe     Last attempt to quit: 1965     Years since quittin.7    Smokeless tobacco: Never Used   Substance Use Topics    Alcohol use: Yes     Alcohol/week: 3.0 standard drinks     Types: 3 Glasses of wine, 4 Standard drinks or equivalent per week     Comment: in a week    Drug use: No            Review of Systems  Review of Systems    Objective:   Physical Exam  Vitals:    19 1445   BP: 131/76   Pulse: 66   Resp: 12   SpO2: 96%   Weight: 159 lb 3.2 oz (72.2 kg)     Wt Readings from Last 3 Encounters:   19 159 lb 3.2 oz (72.2 kg)   19 162 lb 3.2 oz (73.6 kg)   18 168 lb 3.2 oz (76.3 kg)      Physical Exam  NAD  Skin is warm and dry. Well hydrated, no rash. Alert and oriented x 3.   Mood and affect are normal   No cervical, supraclavicular or submandibular

## 2019-09-09 DIAGNOSIS — R63.4 WEIGHT LOSS: ICD-10-CM

## 2019-09-09 LAB
A/G RATIO: 1.7 (ref 1.1–2.2)
ALBUMIN SERPL-MCNC: 4.3 G/DL (ref 3.4–5)
ALP BLD-CCNC: 62 U/L (ref 40–129)
ALT SERPL-CCNC: 7 U/L (ref 10–40)
ANION GAP SERPL CALCULATED.3IONS-SCNC: 14 MMOL/L (ref 3–16)
AST SERPL-CCNC: 16 U/L (ref 15–37)
BASOPHILS ABSOLUTE: 0 K/UL (ref 0–0.2)
BASOPHILS RELATIVE PERCENT: 0.6 %
BILIRUB SERPL-MCNC: 0.6 MG/DL (ref 0–1)
BUN BLDV-MCNC: 17 MG/DL (ref 7–20)
CALCIUM SERPL-MCNC: 10.4 MG/DL (ref 8.3–10.6)
CHLORIDE BLD-SCNC: 103 MMOL/L (ref 99–110)
CO2: 25 MMOL/L (ref 21–32)
CREAT SERPL-MCNC: 0.8 MG/DL (ref 0.8–1.3)
EOSINOPHILS ABSOLUTE: 0.1 K/UL (ref 0–0.6)
EOSINOPHILS RELATIVE PERCENT: 1.2 %
GFR AFRICAN AMERICAN: >60
GFR NON-AFRICAN AMERICAN: >60
GLOBULIN: 2.6 G/DL
GLUCOSE BLD-MCNC: 97 MG/DL (ref 70–99)
HCT VFR BLD CALC: 43.6 % (ref 40.5–52.5)
HEMOGLOBIN: 14.6 G/DL (ref 13.5–17.5)
LYMPHOCYTES ABSOLUTE: 0.7 K/UL (ref 1–5.1)
LYMPHOCYTES RELATIVE PERCENT: 13.3 %
MCH RBC QN AUTO: 32.7 PG (ref 26–34)
MCHC RBC AUTO-ENTMCNC: 33.6 G/DL (ref 31–36)
MCV RBC AUTO: 97.3 FL (ref 80–100)
MONOCYTES ABSOLUTE: 0.5 K/UL (ref 0–1.3)
MONOCYTES RELATIVE PERCENT: 8.9 %
NEUTROPHILS ABSOLUTE: 4.2 K/UL (ref 1.7–7.7)
NEUTROPHILS RELATIVE PERCENT: 76 %
PDW BLD-RTO: 14.1 % (ref 12.4–15.4)
PLATELET # BLD: 210 K/UL (ref 135–450)
PMV BLD AUTO: 10.7 FL (ref 5–10.5)
POTASSIUM SERPL-SCNC: 4.3 MMOL/L (ref 3.5–5.1)
RBC # BLD: 4.49 M/UL (ref 4.2–5.9)
SODIUM BLD-SCNC: 142 MMOL/L (ref 136–145)
TOTAL PROTEIN: 6.9 G/DL (ref 6.4–8.2)
WBC # BLD: 5.5 K/UL (ref 4–11)

## 2019-09-20 ENCOUNTER — TELEPHONE (OUTPATIENT)
Dept: FAMILY MEDICINE CLINIC | Age: 84
End: 2019-09-20

## 2019-09-20 DIAGNOSIS — E78.00 PURE HYPERCHOLESTEROLEMIA: Primary | ICD-10-CM

## 2019-09-20 NOTE — TELEPHONE ENCOUNTER
Patient scheduled for AWV on 12/02/19 wanting to know if he needs to fast for blood work, Please call patient back at [phone] to advise

## 2019-09-21 ENCOUNTER — PATIENT MESSAGE (OUTPATIENT)
Dept: FAMILY MEDICINE CLINIC | Age: 84
End: 2019-09-21

## 2019-09-21 DIAGNOSIS — K59.04 CHRONIC IDIOPATHIC CONSTIPATION: ICD-10-CM

## 2019-10-01 ENCOUNTER — PATIENT MESSAGE (OUTPATIENT)
Dept: FAMILY MEDICINE CLINIC | Age: 84
End: 2019-10-01

## 2019-10-01 DIAGNOSIS — K59.04 CHRONIC IDIOPATHIC CONSTIPATION: ICD-10-CM

## 2019-10-02 DIAGNOSIS — E78.00 PURE HYPERCHOLESTEROLEMIA: ICD-10-CM

## 2019-10-02 LAB
CHOLESTEROL, TOTAL: 149 MG/DL (ref 0–199)
HDLC SERPL-MCNC: 80 MG/DL (ref 40–60)
LDL CHOLESTEROL CALCULATED: 54 MG/DL
TRIGL SERPL-MCNC: 77 MG/DL (ref 0–150)
VLDLC SERPL CALC-MCNC: 15 MG/DL

## 2019-12-02 ENCOUNTER — OFFICE VISIT (OUTPATIENT)
Dept: FAMILY MEDICINE CLINIC | Age: 84
End: 2019-12-02
Payer: MEDICARE

## 2019-12-02 VITALS
BODY MASS INDEX: 25.88 KG/M2 | WEIGHT: 161 LBS | SYSTOLIC BLOOD PRESSURE: 119 MMHG | OXYGEN SATURATION: 98 % | HEART RATE: 80 BPM | RESPIRATION RATE: 12 BRPM | DIASTOLIC BLOOD PRESSURE: 72 MMHG | TEMPERATURE: 96.4 F | HEIGHT: 66 IN

## 2019-12-02 DIAGNOSIS — Z00.00 ROUTINE GENERAL MEDICAL EXAMINATION AT A HEALTH CARE FACILITY: ICD-10-CM

## 2019-12-02 DIAGNOSIS — H61.23 BILATERAL IMPACTED CERUMEN: ICD-10-CM

## 2019-12-02 DIAGNOSIS — I10 ESSENTIAL HYPERTENSION, BENIGN: ICD-10-CM

## 2019-12-02 DIAGNOSIS — Z00.00 WELL ADULT EXAM: Primary | ICD-10-CM

## 2019-12-02 PROCEDURE — G0439 PPPS, SUBSEQ VISIT: HCPCS | Performed by: FAMILY MEDICINE

## 2019-12-02 PROCEDURE — 4040F PNEUMOC VAC/ADMIN/RCVD: CPT | Performed by: FAMILY MEDICINE

## 2019-12-02 PROCEDURE — 1123F ACP DISCUSS/DSCN MKR DOCD: CPT | Performed by: FAMILY MEDICINE

## 2019-12-02 PROCEDURE — 69210 REMOVE IMPACTED EAR WAX UNI: CPT | Performed by: FAMILY MEDICINE

## 2019-12-02 PROCEDURE — G8482 FLU IMMUNIZE ORDER/ADMIN: HCPCS | Performed by: FAMILY MEDICINE

## 2019-12-02 RX ORDER — FESOTERODINE FUMARATE 8 MG/1
TABLET, EXTENDED RELEASE ORAL
COMMUNITY
End: 2020-08-25 | Stop reason: SDUPTHER

## 2019-12-02 RX ORDER — LISINOPRIL 5 MG/1
5 TABLET ORAL DAILY
Qty: 90 TABLET | Refills: 2 | Status: SHIPPED | OUTPATIENT
Start: 2019-12-02 | End: 2019-12-23

## 2019-12-02 ASSESSMENT — PATIENT HEALTH QUESTIONNAIRE - PHQ9
SUM OF ALL RESPONSES TO PHQ QUESTIONS 1-9: 0
SUM OF ALL RESPONSES TO PHQ QUESTIONS 1-9: 0

## 2019-12-02 ASSESSMENT — LIFESTYLE VARIABLES
HAVE YOU OR SOMEONE ELSE BEEN INJURED AS A RESULT OF YOUR DRINKING: 0
HOW OFTEN DURING THE LAST YEAR HAVE YOU BEEN UNABLE TO REMEMBER WHAT HAPPENED THE NIGHT BEFORE BECAUSE YOU HAD BEEN DRINKING: 0
HOW OFTEN DURING THE LAST YEAR HAVE YOU FOUND THAT YOU WERE NOT ABLE TO STOP DRINKING ONCE YOU HAD STARTED: 0
AUDIT TOTAL SCORE: 2
HOW OFTEN DO YOU HAVE SIX OR MORE DRINKS ON ONE OCCASION: 0
HOW MANY STANDARD DRINKS CONTAINING ALCOHOL DO YOU HAVE ON A TYPICAL DAY: 0
HOW OFTEN DURING THE LAST YEAR HAVE YOU FAILED TO DO WHAT WAS NORMALLY EXPECTED FROM YOU BECAUSE OF DRINKING: 0
HOW OFTEN DURING THE LAST YEAR HAVE YOU NEEDED AN ALCOHOLIC DRINK FIRST THING IN THE MORNING TO GET YOURSELF GOING AFTER A NIGHT OF HEAVY DRINKING: 0
HOW OFTEN DURING THE LAST YEAR HAVE YOU HAD A FEELING OF GUILT OR REMORSE AFTER DRINKING: 0
HAS A RELATIVE, FRIEND, DOCTOR, OR ANOTHER HEALTH PROFESSIONAL EXPRESSED CONCERN ABOUT YOUR DRINKING OR SUGGESTED YOU CUT DOWN: 0
AUDIT-C TOTAL SCORE: 2
HOW OFTEN DO YOU HAVE A DRINK CONTAINING ALCOHOL: 2

## 2019-12-23 DIAGNOSIS — N40.1 BENIGN NON-NODULAR PROSTATIC HYPERPLASIA WITH LOWER URINARY TRACT SYMPTOMS: ICD-10-CM

## 2019-12-23 DIAGNOSIS — I10 ESSENTIAL HYPERTENSION, BENIGN: ICD-10-CM

## 2019-12-23 RX ORDER — LISINOPRIL 5 MG/1
TABLET ORAL
Qty: 90 TABLET | Refills: 4 | Status: SHIPPED | OUTPATIENT
Start: 2019-12-23 | End: 2021-03-11

## 2019-12-23 RX ORDER — FINASTERIDE 5 MG/1
TABLET, FILM COATED ORAL
Qty: 90 TABLET | Refills: 4 | Status: SHIPPED | OUTPATIENT
Start: 2019-12-23 | End: 2021-11-09

## 2020-03-05 ENCOUNTER — OFFICE VISIT (OUTPATIENT)
Dept: DERMATOLOGY | Age: 85
End: 2020-03-05
Payer: MEDICARE

## 2020-03-05 PROCEDURE — G8482 FLU IMMUNIZE ORDER/ADMIN: HCPCS | Performed by: DERMATOLOGY

## 2020-03-05 PROCEDURE — 1036F TOBACCO NON-USER: CPT | Performed by: DERMATOLOGY

## 2020-03-05 PROCEDURE — 17000 DESTRUCT PREMALG LESION: CPT | Performed by: DERMATOLOGY

## 2020-03-05 PROCEDURE — G8417 CALC BMI ABV UP PARAM F/U: HCPCS | Performed by: DERMATOLOGY

## 2020-03-05 PROCEDURE — 1123F ACP DISCUSS/DSCN MKR DOCD: CPT | Performed by: DERMATOLOGY

## 2020-03-05 PROCEDURE — 99213 OFFICE O/P EST LOW 20 MIN: CPT | Performed by: DERMATOLOGY

## 2020-03-05 PROCEDURE — G8427 DOCREV CUR MEDS BY ELIG CLIN: HCPCS | Performed by: DERMATOLOGY

## 2020-03-05 PROCEDURE — 4040F PNEUMOC VAC/ADMIN/RCVD: CPT | Performed by: DERMATOLOGY

## 2020-03-05 NOTE — PROGRESS NOTES
[facility]  MD [address]  1935    80 y.o. male     Date of Visit: 3/5/2020    Chief Complaint: skin lesions    History of Present Illness:    1. He complains of a couple of scaly lesions on the scalp. He also reports several asymptomatic lesions on the left side of the chest.    2.  He has a history of AK's-complains of one persistent scaly lesion on the frontal scalp. Review of Systems:  Skin: No new or changing moles. Past Medical History, Family History, Surgical History, Medications and Allergies reviewed.     Past Medical History:   Diagnosis Date    Arthritis     MILD    Bunion 7/29/2011    Burn     RT wrist    Cataract 5/12/2014    Essential hypertension, benign 12/2/2013    Hammer toe 7/29/2011    Hyperlipidemia     Inguinal hernia 7/29/2011     Past Surgical History:   Procedure Laterality Date    CATARACT REMOVAL Bilateral May '14    DENTAL SURGERY      DENTAL SURGERY  03/04/2020    Dental implant     EYE SURGERY      HERNIA REPAIR  1980    Left inguinal    INGUINAL HERNIA REPAIR      LEFT    INGUINAL HERNIA REPAIR  08/24/2011    Right    VARICOSE VEIN SURGERY  2008    VARICOSE VEIN SURGERY      BILATERAL LEGS       Allergies   Allergen Reactions    Percodan [Oxycodone-Aspirin] Nausea Only     Outpatient Medications Marked as Taking for the 3/5/20 encounter (Office Visit) with Charmayne Sinclair, MD   Medication Sig Dispense Refill    lisinopril (PRINIVIL;ZESTRIL) 5 MG tablet TAKE 1 TABLET DAILY 90 tablet 4    finasteride (PROSCAR) 5 MG tablet TAKE 1 TABLET DAILY 90 tablet 4    Fesoterodine Fumarate ER (TOVIAZ) 8 MG TB24 Take by mouth      linaclotide (LINZESS) 290 MCG CAPS capsule Take 1 capsule by mouth every morning (before breakfast) TAKE 1 CAPSULE EVERY MORNING BEFORE BREAKFAST 90 capsule 1    vitamin B-12 (CYANOCOBALAMIN) 1000 MCG tablet Take 1,000 mcg by mouth daily      simvastatin (ZOCOR) 20 MG tablet Take 1 tablet by mouth nightly 90 tablet 1    fluticasone (FLONASE) 50 MCG/ACT nasal spray 1 spray by Each Nare route daily      tamsulosin (FLOMAX) 0.4 MG capsule Take 1 capsule by mouth daily 90 capsule 1    clobetasol (TEMOVATE) 0.05 % cream Apply topically 2 times daily. 30 g 0    Calcium Citrate (CITRACAL PO) Take by mouth      BIOTIN 5000 PO Take by mouth           Physical Examination       The following were examined and determined to be normal: Psych/Neuro, Head/face, Conjunctivae/eyelids, Gums/teeth/lips, Neck, Breast/axilla/chest, Abdomen, Back, RUE, LUE, RLE, LLE and Nails/digits. The following were examined and determined to be abnormal: Scalp/hair. Well-appearing. 1.  Crown of the scalp with 2 round verrucous pink-brown papules. Left breast with several stuck on appearing verrucous brown papules and plaques. 2.  Frontal scalp with one hyperkeratotic erythematous macule. Assessment and Plan     1. SK (seborrheic keratosis)     Reassurance. 2. Actinic keratosis     2 cycles of liquid nitrogen applied to 1 AK on the frontal scalp. Patient was educated regarding the potential risks of blister formation, discomfort, hypopigmentation, and scar. Wound care was discussed. Return in about 1 year (around 3/5/2021).

## 2020-05-28 ENCOUNTER — PATIENT MESSAGE (OUTPATIENT)
Dept: FAMILY MEDICINE CLINIC | Age: 85
End: 2020-05-28

## 2020-06-03 ENCOUNTER — PATIENT MESSAGE (OUTPATIENT)
Dept: FAMILY MEDICINE CLINIC | Age: 85
End: 2020-06-03

## 2020-06-03 ENCOUNTER — TELEPHONE (OUTPATIENT)
Dept: FAMILY MEDICINE CLINIC | Age: 85
End: 2020-06-03

## 2020-09-29 ENCOUNTER — OFFICE VISIT (OUTPATIENT)
Dept: PRIMARY CARE CLINIC | Age: 85
End: 2020-09-29
Payer: MEDICARE

## 2020-09-29 PROCEDURE — 99211 OFF/OP EST MAY X REQ PHY/QHP: CPT | Performed by: NURSE PRACTITIONER

## 2020-09-29 PROCEDURE — G8417 CALC BMI ABV UP PARAM F/U: HCPCS | Performed by: NURSE PRACTITIONER

## 2020-09-29 PROCEDURE — G8428 CUR MEDS NOT DOCUMENT: HCPCS | Performed by: NURSE PRACTITIONER

## 2020-09-29 NOTE — PROGRESS NOTES
Galileo Waller received a viral test for COVID-19. They were educated on isolation and quarantine as appropriate. For any symptoms, they were directed to seek care from their PCP, given contact information to establish with a doctor, directed to an urgent care or the emergency room.

## 2020-10-01 LAB — SARS-COV-2, NAA: NOT DETECTED

## 2020-10-19 RX ORDER — SIMVASTATIN 20 MG
TABLET ORAL
Qty: 90 TABLET | Refills: 0 | Status: SHIPPED | OUTPATIENT
Start: 2020-10-19 | End: 2021-01-18

## 2020-11-23 ENCOUNTER — TELEPHONE (OUTPATIENT)
Dept: FAMILY MEDICINE CLINIC | Age: 85
End: 2020-11-23

## 2020-11-23 NOTE — TELEPHONE ENCOUNTER
----- Message from Lacey Oliver sent at 11/23/2020 10:25 AM EST -----  Subject: Message to Provider    QUESTIONS  Information for Provider? Patient would like some labs done before his   wellness exam.   ---------------------------------------------------------------------------  --------------  CALL BACK INFO  What is the best way for the office to contact you? OK to leave message on   voicemail  Preferred Call Back Phone Number? [phone]  ---------------------------------------------------------------------------  --------------  SCRIPT ANSWERS  Relationship to Patient?  Self

## 2020-11-27 ASSESSMENT — PATIENT HEALTH QUESTIONNAIRE - PHQ9
SUM OF ALL RESPONSES TO PHQ QUESTIONS 1-9: 0
2. FEELING DOWN, DEPRESSED OR HOPELESS: 0
SUM OF ALL RESPONSES TO PHQ QUESTIONS 1-9: 0
1. LITTLE INTEREST OR PLEASURE IN DOING THINGS: 0
SUM OF ALL RESPONSES TO PHQ QUESTIONS 1-9: 0
SUM OF ALL RESPONSES TO PHQ9 QUESTIONS 1 & 2: 0

## 2020-11-27 ASSESSMENT — LIFESTYLE VARIABLES
HOW OFTEN DURING THE LAST YEAR HAVE YOU FOUND THAT YOU WERE NOT ABLE TO STOP DRINKING ONCE YOU HAD STARTED: NEVER
AUDIT-C TOTAL SCORE: 3
HOW OFTEN DURING THE LAST YEAR HAVE YOU HAD A FEELING OF GUILT OR REMORSE AFTER DRINKING: NEVER
HOW OFTEN DURING THE LAST YEAR HAVE YOU FAILED TO DO WHAT WAS NORMALLY EXPECTED FROM YOU BECAUSE OF DRINKING: NEVER
HOW OFTEN DURING THE LAST YEAR HAVE YOU BEEN UNABLE TO REMEMBER WHAT HAPPENED THE NIGHT BEFORE BECAUSE YOU HAD BEEN DRINKING: NEVER
HOW OFTEN DURING THE LAST YEAR HAVE YOU NEEDED AN ALCOHOLIC DRINK FIRST THING IN THE MORNING TO GET YOURSELF GOING AFTER A NIGHT OF HEAVY DRINKING: 0
HOW OFTEN DO YOU HAVE A DRINK CONTAINING ALCOHOL: TWO TO THREE TIMES A WEEK
HOW OFTEN DO YOU HAVE SIX OR MORE DRINKS ON ONE OCCASION: 0
HOW OFTEN DURING THE LAST YEAR HAVE YOU FOUND THAT YOU WERE NOT ABLE TO STOP DRINKING ONCE YOU HAD STARTED: 0
HAS A RELATIVE, FRIEND, DOCTOR, OR ANOTHER HEALTH PROFESSIONAL EXPRESSED CONCERN ABOUT YOUR DRINKING OR SUGGESTED YOU CUT DOWN: 0
HOW OFTEN DURING THE LAST YEAR HAVE YOU BEEN UNABLE TO REMEMBER WHAT HAPPENED THE NIGHT BEFORE BECAUSE YOU HAD BEEN DRINKING: 0
AUDIT-C TOTAL SCORE: 0
HAVE YOU OR SOMEONE ELSE BEEN INJURED AS A RESULT OF YOUR DRINKING: NO
HOW OFTEN DO YOU HAVE SIX OR MORE DRINKS ON ONE OCCASION: NEVER
HAVE YOU OR SOMEONE ELSE BEEN INJURED AS A RESULT OF YOUR DRINKING: 0
HAS A RELATIVE, FRIEND, DOCTOR, OR ANOTHER HEALTH PROFESSIONAL EXPRESSED CONCERN ABOUT YOUR DRINKING OR SUGGESTED YOU CUT DOWN: NO
AUDIT TOTAL SCORE: 0
HOW OFTEN DO YOU HAVE A DRINK CONTAINING ALCOHOL: 3
HOW MANY STANDARD DRINKS CONTAINING ALCOHOL DO YOU HAVE ON A TYPICAL DAY: ONE OR TWO
HOW MANY STANDARD DRINKS CONTAINING ALCOHOL DO YOU HAVE ON A TYPICAL DAY: 0
AUDIT TOTAL SCORE: 3
HOW OFTEN DURING THE LAST YEAR HAVE YOU FAILED TO DO WHAT WAS NORMALLY EXPECTED FROM YOU BECAUSE OF DRINKING: 0
HOW OFTEN DURING THE LAST YEAR HAVE YOU NEEDED AN ALCOHOLIC DRINK FIRST THING IN THE MORNING TO GET YOURSELF GOING AFTER A NIGHT OF HEAVY DRINKING: NEVER
HOW OFTEN DURING THE LAST YEAR HAVE YOU HAD A FEELING OF GUILT OR REMORSE AFTER DRINKING: 0

## 2020-12-02 DIAGNOSIS — E78.00 PURE HYPERCHOLESTEROLEMIA: ICD-10-CM

## 2020-12-02 LAB
A/G RATIO: 1.6 (ref 1.1–2.2)
ALBUMIN SERPL-MCNC: 4.1 G/DL (ref 3.4–5)
ALP BLD-CCNC: 63 U/L (ref 40–129)
ALT SERPL-CCNC: 8 U/L (ref 10–40)
ANION GAP SERPL CALCULATED.3IONS-SCNC: 7 MMOL/L (ref 3–16)
AST SERPL-CCNC: 16 U/L (ref 15–37)
BILIRUB SERPL-MCNC: 0.6 MG/DL (ref 0–1)
BUN BLDV-MCNC: 13 MG/DL (ref 7–20)
CALCIUM SERPL-MCNC: 10 MG/DL (ref 8.3–10.6)
CHLORIDE BLD-SCNC: 104 MMOL/L (ref 99–110)
CHOLESTEROL, TOTAL: 157 MG/DL (ref 0–199)
CO2: 28 MMOL/L (ref 21–32)
CREAT SERPL-MCNC: 0.7 MG/DL (ref 0.8–1.3)
GFR AFRICAN AMERICAN: >60
GFR NON-AFRICAN AMERICAN: >60
GLOBULIN: 2.5 G/DL
GLUCOSE BLD-MCNC: 94 MG/DL (ref 70–99)
HDLC SERPL-MCNC: 69 MG/DL (ref 40–60)
LDL CHOLESTEROL CALCULATED: 73 MG/DL
POTASSIUM SERPL-SCNC: 4.5 MMOL/L (ref 3.5–5.1)
SODIUM BLD-SCNC: 139 MMOL/L (ref 136–145)
TOTAL PROTEIN: 6.6 G/DL (ref 6.4–8.2)
TRIGL SERPL-MCNC: 75 MG/DL (ref 0–150)
TSH REFLEX: 3.15 UIU/ML (ref 0.27–4.2)
VLDLC SERPL CALC-MCNC: 15 MG/DL

## 2020-12-04 ENCOUNTER — OFFICE VISIT (OUTPATIENT)
Dept: FAMILY MEDICINE CLINIC | Age: 85
End: 2020-12-04
Payer: MEDICARE

## 2020-12-04 VITALS
BODY MASS INDEX: 25.88 KG/M2 | HEART RATE: 65 BPM | TEMPERATURE: 97.5 F | DIASTOLIC BLOOD PRESSURE: 78 MMHG | SYSTOLIC BLOOD PRESSURE: 132 MMHG | RESPIRATION RATE: 12 BRPM | OXYGEN SATURATION: 98 % | WEIGHT: 161 LBS | HEIGHT: 66 IN

## 2020-12-04 PROCEDURE — G0439 PPPS, SUBSEQ VISIT: HCPCS | Performed by: FAMILY MEDICINE

## 2020-12-04 RX ORDER — SIMVASTATIN 20 MG
TABLET ORAL
Qty: 90 TABLET | Refills: 0 | Status: CANCELLED | OUTPATIENT
Start: 2020-12-04

## 2020-12-04 RX ORDER — LISINOPRIL 5 MG/1
TABLET ORAL
Qty: 90 TABLET | Refills: 4 | Status: CANCELLED | OUTPATIENT
Start: 2020-12-04

## 2020-12-04 RX ORDER — FINASTERIDE 5 MG/1
5 TABLET, FILM COATED ORAL DAILY
Qty: 90 TABLET | Refills: 4 | Status: CANCELLED | OUTPATIENT
Start: 2020-12-04

## 2020-12-04 RX ORDER — DOCUSATE SODIUM 100 MG/1
100 CAPSULE, LIQUID FILLED ORAL 2 TIMES DAILY
COMMUNITY
End: 2021-11-09

## 2020-12-04 NOTE — PROGRESS NOTES
Medicare Annual Wellness Visit  Name: Jan Perdue Date: 2020   MRN: <[id number]> Sex: Male   Age: 80 y.o. Ethnicity: Non-/Non    : 1935 Race: Robby Marks is here for Medicare AWV    Screenings for behavioral, psychosocial and functional/safety risks, and cognitive dysfunction are all negative except as indicated below. These results, as well as other patient data [facility] the [address] [facility], are documented in Flowsheets linked to this Encounter. Hypertension: Patient here for follow-up of elevated blood pressure. He is exercising and is adherent to low salt diet. Blood pressure is well controlled at home. Cardiac symptoms none. Patient denies chest pressure/discomfort, exertional chest pressure/discomfort, lower extremity edema, palpitations and paroxysmal nocturnal dyspnea. Cardiovascular risk factors: advanced age (older than 54 for men, 72 for women), dyslipidemia, hypertension and male gender. Use of agents associated with hypertension: none. History of target organ damage: none. His constipation is controlled with Citrucel; takes Linzess occ and it helps. OAB is well controlled with Toviaz and Proscar. He has been seeing Dr. Hermelinda Smith.          Allergies   Allergen Reactions    Percodan [Oxycodone-Aspirin] Nausea Only     Outpatient Medications Marked as Taking for the 20 encounter (Office Visit) with Nam Garcia MD   Medication Sig Dispense Refill    Multiple Vitamins-Minerals (CENTRUM SILVER 50+MEN PO) Take 1 tablet by mouth      docusate sodium (COLACE) 100 MG capsule Take 100 mg by mouth 2 times daily      simvastatin (ZOCOR) 20 MG tablet TAKE 1 TABLET NIGHTLY 90 tablet 0    Fesoterodine Fumarate ER (TOVIAZ) 8 MG TB24 Take 8 mg by mouth daily 90 tablet 1    linaclotide (LINZESS) 145 MCG capsule Take 1 capsule by mouth every morning (before breakfast) 90 capsule 1    lisinopril (PRINIVIL;ZESTRIL) 5 MG tablet TAKE 1 TABLET DAILY 90 tablet 4    finasteride (PROSCAR) 5 MG tablet TAKE 1 TABLET DAILY 90 tablet 4    vitamin B-12 (CYANOCOBALAMIN) 1000 MCG tablet Take 1,000 mcg by mouth daily      fluticasone (FLONASE) 50 MCG/ACT nasal spray 1 spray by Each Nare route daily      clobetasol (TEMOVATE) 0.05 % cream Apply topically 2 times daily. 30 g 0    Calcium Citrate (CITRACAL PO) Take by mouth      BIOTIN 5000 PO Take by mouth          Past Medical History:   Diagnosis Date    Arthritis     MILD    Bunion 7/29/2011    Burn     RT wrist    Cataract 5/12/2014    Essential hypertension, benign 12/2/2013    Hammer toe 7/29/2011    Hyperlipidemia     Inguinal hernia 7/29/2011       Past Surgical History:   Procedure Laterality Date    CATARACT REMOVAL Bilateral May '14    DENTAL SURGERY      DENTAL SURGERY  03/04/2020    Dental implant     EYE SURGERY      HERNIA REPAIR  1980    Left inguinal    INGUINAL HERNIA REPAIR      LEFT    INGUINAL HERNIA REPAIR  08/24/2011    Right    VARICOSE VEIN SURGERY  2008    VARICOSE VEIN SURGERY      BILATERAL LEGS       Family History   Problem Relation Age of Onset    Breast Cancer Mother 52    Heart Attack Father 72    Lung Cancer Sister        CareTeam (Including outside providers/suppliers regularly involved in providing care):   Patient Care Team:  Pushpa Batista MD as PCP - General (Family Medicine)  Pushpa Batista MD as PCP - REHABILITATION [facility] Empaneled Provider  Mayito Leggett MD as Consulting Physician (Otolaryngology)    Wt Readings from Last 3 Encounters:   12/04/20 161 lb (73 kg)   12/02/19 161 lb (73 kg)   09/03/19 159 lb 3.2 oz (72.2 kg)     Vitals:    12/04/20 1155 12/04/20 1207   BP: (!) 159/75 132/78   Pulse: 65    Resp: 12    Temp: 97.5 °F (36.4 °C)    TempSrc: Temporal    SpO2: 98%    Weight: 161 lb (73 kg)    Height: 5' 6\" (1.676 m)      Body mass index is 25.99 kg/m².     Based upon direct observation of the patient, evaluation of cognition reveals recent and remote memory intact. General Appearance: alert and oriented to person, place and time, well developed and well- nourished, in no acute distress  Skin: warm and dry, no rash or erythema  Head: normocephalic and atraumatic  Eyes: pupils equal, round, and reactive to light, extraocular eye movements intact, conjunctivae normal  ENT: tympanic membrane, external ear and ear canal normal bilaterally, nose without deformity, nasal mucosa and turbinates normal without polyps  Neck: supple and non-tender without mass, no thyromegaly or thyroid nodules, no cervical lymphadenopathy  Pulmonary/Chest: clear to auscultation bilaterally- no wheezes, rales or rhonchi, normal air movement, no respiratory distress  Cardiovascular: normal rate, regular rhythm, normal S1 and S2, no murmurs, rubs, clicks, or gallops, distal pulses intact, no carotid bruits  Abdomen: soft, non-tender, non-distended, normal bowel sounds, no masses or organomegaly  Extremities: no cyanosis, clubbing or edema  Musculoskeletal: normal range of motion, no joint swelling, deformity or tenderness  Neurologic: reflexes normal and symmetric, no cranial nerve deficit, gait, coordination and speech normal    Patient's complete Health Risk Assessment and screening values have been reviewed and are found in Flowsheets. The following problems were reviewed today and where indicated follow up appointments were made and/or referrals ordered. Positive Risk Factor Screenings with Interventions:     General Health and ACP:  General  In general, how would you say your health is?: Very Good  In the past 7 days, have you experienced any of the following?  New or Increased Pain, New or Increased Fatigue, Loneliness, Social Isolation, Stress or Anger?: (!) Stress  Do you get the social and emotional support that you need?: Yes  Do you have a Living Will?: Yes  Advance Directives     Power of  Living Will ACP-Advance Directive ACP-Power of     Not on File Hepatitis A vaccine  Aged Out    Hepatitis B vaccine  Aged Out    Hib vaccine  Aged Out    Meningococcal (ACWY) vaccine  Aged Out     Recommendations for [email or web address] Due: see orders and patient instructions/AVS.  . Recommended screening schedule for the next 5-10 years is provided to the patient in written form: see Patient Instructions/AVS.    Francisco Reyes was seen today for medicare awv. Diagnoses and all orders for this visit:    Routine general medical examination at a health care facility    Essential hypertension, benign  At goal < 130/80  Continue diet, lisinopril    Benign non-nodular prostatic hyperplasia with lower urinary tract symptoms  He would like to stop Proscar; Dr. Liliana Tran told him his sxs did not come from his prostate. Symptoms from small bladder. Ok to do trial off Proscar. Pure hypercholesterolemia  LDL goal < 100   Tolerating statin                Cardiovascular Disease Risk Counseling: Assessed the patient's risk to develop cardiovascular disease and reviewed main risk factors. Reviewed steps to reduce disease risk including:   · Quitting tobacco use, reducing amount smoked, or not starting the habit  · Making healthy food choices  · Being physically active and gradualy increasing activity levels   · Reduce weight and determine a healthy BMI goal  · Monitor blood pressure and treat if higher than 140/90 mmHg  · Maintain blood total cholesterol levels under 5 mmol/l or 190 mg/dl  · Maintain LDL cholesterol levels under 3.0 mmol/l or 115 mg/dl   · Control blood glucose levels    Provided a follow up plan. Time spent (minutes): 5 minutes   Advance Care Planning   Advanced Care Planning: Discussed the patients choices for care and treatment in case of a health event that adversely affects decision-making abilities. Also discussed the patients long-term treatment options.  Reviewed with the patient the [facility] [place] [facility] [place] Will Declaration forms  Reviewed the process of designating a competent adult as an Agent (or -in-fact) that could take make health care decisions for the patient if incompetent. Patient was asked to complete the declaration forms, either acknowledge the forms by a public notary or an eligible witness and provide a signed copy to the practice office. Time spent (minutes): on file, no changes.

## 2020-12-04 NOTE — PATIENT INSTRUCTIONS
Personalized Preventive Plan for Tono Elizabeth - 12/4/2020  Medicare offers a range of preventive health benefits. Some of the tests and screenings are paid in full while other may be subject to a deductible, co-insurance, and/or copay. Some of these benefits include a comprehensive review of your medical history including lifestyle, illnesses that may run in your family, and various assessments and screenings as appropriate. After reviewing your medical record and screening and assessments performed today your provider may have ordered immunizations, labs, imaging, and/or referrals for you. A list of these orders (if applicable) as well as your Preventive Care list are included within your After Visit Summary for your review. Other Preventive Recommendations:    · A preventive eye exam performed by an eye specialist is recommended every 1-2 years to screen for glaucoma; cataracts, macular degeneration, and other eye disorders. · A preventive dental visit is recommended every 6 months. · Try to get at least 150 minutes of exercise per week or 10,000 steps per day on a pedometer . · Order or download the FREE \"Exercise & Physical Activity: Your Everyday Guide\" from [employer]. Call 5-399.100.6422 or search [employer] online. · You need 6199-4852 mg of calcium and 2542-5818 IU of vitamin D per day. It is possible to meet your calcium requirement with diet alone, but a vitamin D supplement is usually necessary to meet this goal.  · When exposed to the sun, use a sunscreen that protects against both UVA and UVB radiation with an SPF of 30 or greater. Reapply every 2 to 3 hours or after sweating, drying off with a towel, or swimming. · Always wear a seat belt when traveling in a car. Always wear a helmet when riding a bicycle or motorcycle.

## 2020-12-28 ENCOUNTER — PATIENT MESSAGE (OUTPATIENT)
Dept: FAMILY MEDICINE CLINIC | Age: 85
End: 2020-12-28

## 2020-12-28 NOTE — TELEPHONE ENCOUNTER
Please advise  Last ov 12/4/20  Last labs 12/2/20    Would you please notify [facility] to send me Linzess, 90 days, 145 mcg with refills? i would like this done  right after New Year's Day, if possible.    Thank you,  Mary Ann Fletcher

## 2021-01-17 DIAGNOSIS — E78.00 PURE HYPERCHOLESTEROLEMIA: ICD-10-CM

## 2021-01-18 RX ORDER — SIMVASTATIN 20 MG
TABLET ORAL
Qty: 90 TABLET | Refills: 3 | Status: SHIPPED | OUTPATIENT
Start: 2021-01-18 | End: 2022-01-26

## 2021-01-18 NOTE — TELEPHONE ENCOUNTER
Last OV 12/4/20  Please advise  Thank you      Ref.  Range 12/2/2020 08:53   Cholesterol, Total Latest Ref Range: 0 - 199 mg/dL 157   HDL Cholesterol Latest Ref Range: 40 - 60 mg/dL 69 (H)   LDL Calculated Latest Ref Range: <100 mg/dL 73   Triglycerides Latest Ref Range: 0 - 150 mg/dL 75   VLDL Cholesterol Calculated Latest Ref Range: Not Established mg/dL 15

## 2021-03-11 ENCOUNTER — OFFICE VISIT (OUTPATIENT)
Dept: DERMATOLOGY | Age: 86
End: 2021-03-11
Payer: MEDICARE

## 2021-03-11 VITALS — TEMPERATURE: 97.3 F

## 2021-03-11 DIAGNOSIS — D69.2 SOLAR PURPURA (HCC): ICD-10-CM

## 2021-03-11 DIAGNOSIS — L82.1 SK (SEBORRHEIC KERATOSIS): ICD-10-CM

## 2021-03-11 DIAGNOSIS — L57.0 ACTINIC KERATOSIS: Primary | ICD-10-CM

## 2021-03-11 PROCEDURE — 17003 DESTRUCT PREMALG LES 2-14: CPT | Performed by: DERMATOLOGY

## 2021-03-11 PROCEDURE — 99212 OFFICE O/P EST SF 10 MIN: CPT | Performed by: DERMATOLOGY

## 2021-03-11 PROCEDURE — 17000 DESTRUCT PREMALG LESION: CPT | Performed by: DERMATOLOGY

## 2021-03-11 NOTE — PROGRESS NOTES
[facility] Dermatology  Ese Christopher MD  [facility] 21.  1935    80 y.o. male     Date of Visit: 3/11/2021    Chief Complaint: skin lesions    History of Present Illness:    1. Follow-up for history of actinic keratoses-complains of few scaly lesions on the top of the scalp. 2.  He also has several asymptomatic growths on the scalp and back. 3.  He complains of few red lesions on the upper arms. Review of Systems:  Gen: Feels well, good sense of health. Past Medical History, Family History, Surgical History, Medications and Allergies reviewed.     Past Medical History:   Diagnosis Date    Arthritis     MILD    Bunion 7/29/2011    Burn     RT wrist    Cataract 5/12/2014    Essential hypertension, benign 12/2/2013    Hammer toe 7/29/2011    Hyperlipidemia     Inguinal hernia 7/29/2011     Past Surgical History:   Procedure Laterality Date    CATARACT REMOVAL Bilateral May '14    DENTAL SURGERY      DENTAL SURGERY  03/04/2020    Dental implant     EYE SURGERY      HERNIA REPAIR  1980    Left inguinal    INGUINAL HERNIA REPAIR      LEFT    INGUINAL HERNIA REPAIR  08/24/2011    Right    VARICOSE VEIN SURGERY  2008    VARICOSE VEIN SURGERY      BILATERAL LEGS       Allergies   Allergen Reactions    Percodan [Oxycodone-Aspirin] Nausea Only     Outpatient Medications Marked as Taking for the 3/11/21 encounter (Office Visit) with Linda Garrison MD   Medication Sig Dispense Refill    simvastatin (ZOCOR) 20 MG tablet TAKE 1 TABLET NIGHTLY 90 tablet 3    linaclotide (LINZESS) 145 MCG capsule Take 1 capsule by mouth every morning (before breakfast) 90 capsule 1    Multiple Vitamins-Minerals (CENTRUM SILVER 50+MEN PO) Take 1 tablet by mouth      docusate sodium (COLACE) 100 MG capsule Take 100 mg by mouth 2 times daily      Fesoterodine Fumarate ER (TOVIAZ) 8 MG TB24 Take 8 mg by mouth daily 90 tablet 1    lisinopril (PRINIVIL;ZESTRIL) 5 MG tablet TAKE 1 TABLET DAILY 90 tablet 4    finasteride (PROSCAR) 5 MG tablet TAKE 1 TABLET DAILY 90 tablet 4    vitamin B-12 (CYANOCOBALAMIN) 1000 MCG tablet Take 1,000 mcg by mouth daily      fluticasone (FLONASE) 50 MCG/ACT nasal spray 1 spray by Each Nare route daily      clobetasol (TEMOVATE) 0.05 % cream Apply topically 2 times daily. 30 g 0    Calcium Citrate (CITRACAL PO) Take by mouth      BIOTIN 5000 PO Take by mouth         Physical Examination       The following were examined and determined to be normal: Psych/Neuro, Conjunctivae/eyelids, Gums/teeth/lips, Neck, Breast/axilla/chest, Abdomen, Back, RUE, LUE, RLE, LLE and Nails/digits. The following were examined and determined to be abnormal: Scalp/hair and Head/face. Well appearing. 1.  Vertex scalp - 4, left lateral lower cheek - 1: ill defined irreg shaped keratotic pink macules. 2.  Back, scalp and left side of the face with several stuck-on appearing tan-brown verrucous papules and plaques. 3.  Upper arms with few small purpuric patches. Assessment and Plan     1. Actinic keratosis - few    2 cycles of liquid nitrogen applied to 5 AKs: Vertex scalp - 4, left lateral lower cheek - 1. Patient was educated regarding the potential risks of blister formation, discomfort. Wound care was discussed. 2. SK (seborrheic keratosis)     Reassurance. 3. Solar purpura     Reassurance. Return in about 1 year (around 3/11/2022).     --Pierre Berry MD

## 2021-05-07 RX ORDER — FESOTERODINE FUMARATE 8 MG/1
8 TABLET, FILM COATED, EXTENDED RELEASE ORAL DAILY
Qty: 90 TABLET | Refills: 1 | Status: SHIPPED | OUTPATIENT
Start: 2021-05-07 | End: 2021-11-15

## 2021-08-31 ENCOUNTER — HOSPITAL ENCOUNTER (EMERGENCY)
Age: 86
Discharge: HOME | End: 2021-08-31
Attending: EMERGENCY MEDICINE
Payer: MEDICARE

## 2021-08-31 VITALS
HEART RATE: 88 BPM | HEIGHT: 69 IN | RESPIRATION RATE: 16 BRPM | SYSTOLIC BLOOD PRESSURE: 173 MMHG | OXYGEN SATURATION: 98 % | WEIGHT: 163.6 LBS | BODY MASS INDEX: 24.23 KG/M2 | DIASTOLIC BLOOD PRESSURE: 76 MMHG | TEMPERATURE: 98 F

## 2021-08-31 DIAGNOSIS — S51.012A SKIN TEAR OF ELBOW WITHOUT COMPLICATION, LEFT, INITIAL ENCOUNTER: Primary | ICD-10-CM

## 2021-08-31 PROCEDURE — 12001 RPR S/N/AX/GEN/TRNK 2.5CM/<: CPT

## 2021-08-31 PROCEDURE — 99284 EMERGENCY DEPT VISIT MOD MDM: CPT

## 2021-08-31 NOTE — ED TRIAGE NOTES
Patient to ed with complaints of a left elbow skin tear which happened when he fell out of bed two days ago, patient denies other injury.

## 2021-08-31 NOTE — ED NOTES
Patient given discharge instructions verbal and written, patient verbalized understanding. Alert/oriented X4, Clear speech.   Patient exhibits no distress, ambulates with steady gait per self leaving unit, no further request.     Maddie Ortiz RN  08/31/21 [id number]

## 2021-09-01 NOTE — ED PROVIDER NOTES
TRIAGE CHIEF COMPLAINT:   Chief Complaint   Patient presents with    Laceration     left elbow skin tear         HPI: Steven Bueno is a 80 y.o. male who presents to the Emergency Department with complaint of falling out of bed 2 nights ago and suffering a skin tear on his left elbow. Patient's wife states that continues to bleed occasionally. He denies any pain with range of motion. No fever or chills. Denies any numbness or weakness. He states his tetanus is up-to-date. REVIEW OF SYSTEMS:  6 systems reviewed. Pertinent positives per HPI. Otherwise noted to be negative. Nursing notes reviewed and agree with above. Past medical/surgical history reviewed. MEDICATIONS   Discharge Medication List as of 8/31/2021  2:47 PM      CONTINUE these medications which have NOT CHANGED    Details   Fesoterodine Fumarate ER (TOVIAZ) 8 MG TB24 Take 8 mg by mouth daily, Disp-90 tablet, R-1Normal      lisinopril (PRINIVIL;ZESTRIL) 5 MG tablet TAKE 1 TABLET DAILY, Disp-90 tablet, R-2Normal      simvastatin (ZOCOR) 20 MG tablet TAKE 1 TABLET NIGHTLY, Disp-90 tablet, R-3Normal      linaclotide (LINZESS) 145 MCG capsule Take 1 capsule by mouth every morning (before breakfast), Disp-90 capsule, R-1Normal      Multiple Vitamins-Minerals (CENTRUM SILVER 50+MEN PO) Take 1 tablet by mouthHistorical Med      docusate sodium (COLACE) 100 MG capsule Take 100 mg by mouth 2 times dailyHistorical Med      finasteride (PROSCAR) 5 MG tablet TAKE 1 TABLET DAILY, Disp-90 tablet, R-4Normal      vitamin B-12 (CYANOCOBALAMIN) 1000 MCG tablet Take 1,000 mcg by mouth dailyHistorical Med      fluticasone (FLONASE) 50 MCG/ACT nasal spray 1 spray by Each Nare route dailyHistorical Med      clobetasol (TEMOVATE) 0.05 % cream Apply topically 2 times daily. , Disp-30 g, R-0, NO PRINT      Calcium Citrate (CITRACAL PO) Take by mouth      BIOTIN 5000 PO Take by mouth               ALLERGIES   Allergies   Allergen Reactions    Percodan [Oxycodone-Aspirin] Nausea Only         BP (!) 173/76   Pulse 88   Temp 98 °F (36.7 °C) (Oral)   Resp 16   Ht 5' 9\" (1.753 m)   Wt 163 lb 9.6 oz (74.2 kg)   SpO2 98%   BMI 24.16 kg/m²   General:  No acute distress. Non toxic appearance  Head:   Normocephalic and atraumatic  Eyes:   Conjunctiva clear, MELANY, EOM's intact. Sclera anicteric. ENT:   Mucous membranes moist  Neck:   Supple. No adenopathy. Lungs/Chest:  No respiratory distress  CVS:   Regular rate and rhythm  Extremities:  Full range of motion  Skin:   Small rectangular skin tear noted on the posterior lateral aspect of the left elbow. No bleeding noted. There is no cellulitis or drainage. No evidence of abscess. No lymphangitic streaking. Range of motion is normal.  Distal neurovascular exam is intact. Neuro:  Alert and OX3. Speech clear and appropriate. No extremity weakness. Normal sensation in all extremities. No facial asymmetry. Gait normal.  Psych:   Affect normal. Mood normal        RADIOLOGY:      LAB      PROCEDURES:   The laceration was cleaned by the ED technician then a small Mepitel dressing placed over the skin tear. An Ace wrap was applied over the Mepitel dressing. ED COURSE / MDM:  80-year-old male with skin tear to left elbow that occurred 2 nights ago. No evidence of infection. Distal neurovascular exam is intact. No bony tenderness. I did not see an indication for x-rays. The patient's wife asked about suturing but I explained that this was not an option due to the type of laceration and the age of it. It was cleaned that a Mepitel dressing placed. It was covered with an Ace wrap. I recommended rest and limited motion of his arm for the next week or 2. Advised Tylenol if needed for pain. Recommended follow-up with his primary care doctor for recheck. I discussed with Pierre Boone the results of evaluation in the Emergency Department, diagnosis, care and prognosis.  The plan is to discharge to home. The patient is in agreement with the plan and questions have been answered. I also discussed with the patient and/or family the reasons which may require a return visit and the importance of follow-up care.        (Please note that portions of this note may have been completed with a voice recognition program.  Efforts were made to edit the dictation but occasionally words are mis-transcribed)        FINAL IMPRESSION:  1 --skin tear of left elbow  2 --elevated blood pressure reading            Floresita Maravilla MD  09/01/21 [id number]

## 2021-10-08 ENCOUNTER — TELEPHONE (OUTPATIENT)
Dept: FAMILY MEDICINE CLINIC | Age: 86
End: 2021-10-08

## 2021-10-08 DIAGNOSIS — E78.00 PURE HYPERCHOLESTEROLEMIA: Primary | ICD-10-CM

## 2021-10-08 NOTE — TELEPHONE ENCOUNTER
PT called wanting to know if he could get his labs done before his appointment on 12/7/21 -     please advise, thank you!

## 2021-11-08 ENCOUNTER — TELEPHONE (OUTPATIENT)
Dept: DERMATOLOGY | Age: 86
End: 2021-11-08

## 2021-11-08 NOTE — TELEPHONE ENCOUNTER
The patient is calling about scabs on scalp, he had pre cancers on his scalp frozen, they scabbed but never healed.      [phone]

## 2021-11-09 ENCOUNTER — OFFICE VISIT (OUTPATIENT)
Dept: DERMATOLOGY | Age: 86
End: 2021-11-09
Payer: MEDICARE

## 2021-11-09 VITALS — TEMPERATURE: 97.4 F

## 2021-11-09 DIAGNOSIS — L57.0 ACTINIC KERATOSIS: Primary | ICD-10-CM

## 2021-11-09 DIAGNOSIS — L82.1 SK (SEBORRHEIC KERATOSIS): ICD-10-CM

## 2021-11-09 PROCEDURE — 99212 OFFICE O/P EST SF 10 MIN: CPT | Performed by: DERMATOLOGY

## 2021-11-09 PROCEDURE — 17000 DESTRUCT PREMALG LESION: CPT | Performed by: DERMATOLOGY

## 2021-11-09 PROCEDURE — 17003 DESTRUCT PREMALG LES 2-14: CPT | Performed by: DERMATOLOGY

## 2021-11-09 NOTE — PROGRESS NOTES
0    BIOTIN 5000 PO Take by mouth         Physical Examination       Well appearing. 1.  Right vertex scalp - 2 keratotic pink macules. 2.  Crown of the scalp with a stuck on appearing verrucous light brown plaque. Assessment and Plan     1. Actinic keratosis - 2    2 cycles of liquid nitrogen applied to 2 AKs on the right vertex scalp. Patient was educated regarding the potential risks of blister formation and discomfort. Wound care was discussed. 2. SK (seborrheic keratosis)     Reassurance.          --Elba Blue MD

## 2021-12-01 ASSESSMENT — LIFESTYLE VARIABLES
HOW OFTEN DO YOU HAVE A DRINK CONTAINING ALCOHOL: TWO TO FOUR TIMES A MONTH
HOW OFTEN DURING THE LAST YEAR HAVE YOU HAD A FEELING OF GUILT OR REMORSE AFTER DRINKING: NEVER
HOW MANY STANDARD DRINKS CONTAINING ALCOHOL DO YOU HAVE ON A TYPICAL DAY: 0
AUDIT-C TOTAL SCORE: 2
HOW OFTEN DO YOU HAVE SIX OR MORE DRINKS ON ONE OCCASION: NEVER
HOW OFTEN DURING THE LAST YEAR HAVE YOU FOUND THAT YOU WERE NOT ABLE TO STOP DRINKING ONCE YOU HAD STARTED: 0
HOW OFTEN DURING THE LAST YEAR HAVE YOU FOUND THAT YOU WERE NOT ABLE TO STOP DRINKING ONCE YOU HAD STARTED: NEVER
HOW OFTEN DURING THE LAST YEAR HAVE YOU FAILED TO DO WHAT WAS NORMALLY EXPECTED FROM YOU BECAUSE OF DRINKING: 0
HAS A RELATIVE, FRIEND, DOCTOR, OR ANOTHER HEALTH PROFESSIONAL EXPRESSED CONCERN ABOUT YOUR DRINKING OR SUGGESTED YOU CUT DOWN: NO
HAVE YOU OR SOMEONE ELSE BEEN INJURED AS A RESULT OF YOUR DRINKING: 0
HOW OFTEN DURING THE LAST YEAR HAVE YOU BEEN UNABLE TO REMEMBER WHAT HAPPENED THE NIGHT BEFORE BECAUSE YOU HAD BEEN DRINKING: NEVER
HOW OFTEN DURING THE LAST YEAR HAVE YOU NEEDED AN ALCOHOLIC DRINK FIRST THING IN THE MORNING TO GET YOURSELF GOING AFTER A NIGHT OF HEAVY DRINKING: NEVER
HOW OFTEN DURING THE LAST YEAR HAVE YOU NEEDED AN ALCOHOLIC DRINK FIRST THING IN THE MORNING TO GET YOURSELF GOING AFTER A NIGHT OF HEAVY DRINKING: 0
HOW OFTEN DO YOU HAVE A DRINK CONTAINING ALCOHOL: 2
HOW OFTEN DO YOU HAVE SIX OR MORE DRINKS ON ONE OCCASION: 0
HAVE YOU OR SOMEONE ELSE BEEN INJURED AS A RESULT OF YOUR DRINKING: NO
AUDIT TOTAL SCORE: 0
HOW OFTEN DURING THE LAST YEAR HAVE YOU FAILED TO DO WHAT WAS NORMALLY EXPECTED FROM YOU BECAUSE OF DRINKING: NEVER
HOW OFTEN DURING THE LAST YEAR HAVE YOU HAD A FEELING OF GUILT OR REMORSE AFTER DRINKING: 0
AUDIT TOTAL SCORE: 2
AUDIT-C TOTAL SCORE: 0
HOW OFTEN DURING THE LAST YEAR HAVE YOU BEEN UNABLE TO REMEMBER WHAT HAPPENED THE NIGHT BEFORE BECAUSE YOU HAD BEEN DRINKING: 0
HAS A RELATIVE, FRIEND, DOCTOR, OR ANOTHER HEALTH PROFESSIONAL EXPRESSED CONCERN ABOUT YOUR DRINKING OR SUGGESTED YOU CUT DOWN: 0
HOW MANY STANDARD DRINKS CONTAINING ALCOHOL DO YOU HAVE ON A TYPICAL DAY: ONE OR TWO

## 2021-12-01 ASSESSMENT — PATIENT HEALTH QUESTIONNAIRE - PHQ9
SUM OF ALL RESPONSES TO PHQ QUESTIONS 1-9: 1
1. LITTLE INTEREST OR PLEASURE IN DOING THINGS: 0
SUM OF ALL RESPONSES TO PHQ QUESTIONS 1-9: 1
SUM OF ALL RESPONSES TO PHQ QUESTIONS 1-9: 1
SUM OF ALL RESPONSES TO PHQ9 QUESTIONS 1 & 2: 1
2. FEELING DOWN, DEPRESSED OR HOPELESS: 1

## 2021-12-02 DIAGNOSIS — E78.00 PURE HYPERCHOLESTEROLEMIA: ICD-10-CM

## 2021-12-02 LAB
A/G RATIO: 1.8 (ref 1.1–2.2)
ALBUMIN SERPL-MCNC: 4.2 G/DL (ref 3.4–5)
ALP BLD-CCNC: 64 U/L (ref 40–129)
ALT SERPL-CCNC: 9 U/L (ref 10–40)
ANION GAP SERPL CALCULATED.3IONS-SCNC: 13 MMOL/L (ref 3–16)
AST SERPL-CCNC: 18 U/L (ref 15–37)
BILIRUB SERPL-MCNC: 0.6 MG/DL (ref 0–1)
BUN BLDV-MCNC: 16 MG/DL (ref 7–20)
CALCIUM SERPL-MCNC: 9.8 MG/DL (ref 8.3–10.6)
CHLORIDE BLD-SCNC: 106 MMOL/L (ref 99–110)
CHOLESTEROL, TOTAL: 154 MG/DL (ref 0–199)
CO2: 25 MMOL/L (ref 21–32)
CREAT SERPL-MCNC: 0.8 MG/DL (ref 0.8–1.3)
GFR AFRICAN AMERICAN: >60
GFR NON-AFRICAN AMERICAN: >60
GLUCOSE BLD-MCNC: 94 MG/DL (ref 70–99)
HDLC SERPL-MCNC: 68 MG/DL (ref 40–60)
LDL CHOLESTEROL CALCULATED: 70 MG/DL
POTASSIUM SERPL-SCNC: 4.5 MMOL/L (ref 3.5–5.1)
SODIUM BLD-SCNC: 144 MMOL/L (ref 136–145)
TOTAL PROTEIN: 6.6 G/DL (ref 6.4–8.2)
TRIGL SERPL-MCNC: 80 MG/DL (ref 0–150)
TSH REFLEX: 2.65 UIU/ML (ref 0.27–4.2)
VLDLC SERPL CALC-MCNC: 16 MG/DL

## 2021-12-07 ENCOUNTER — OFFICE VISIT (OUTPATIENT)
Dept: FAMILY MEDICINE CLINIC | Age: 86
End: 2021-12-07
Payer: MEDICARE

## 2021-12-07 VITALS
HEART RATE: 79 BPM | BODY MASS INDEX: 24.64 KG/M2 | TEMPERATURE: 97.8 F | RESPIRATION RATE: 14 BRPM | WEIGHT: 162.6 LBS | HEIGHT: 68 IN | DIASTOLIC BLOOD PRESSURE: 78 MMHG | SYSTOLIC BLOOD PRESSURE: 142 MMHG | OXYGEN SATURATION: 98 %

## 2021-12-07 DIAGNOSIS — Z00.00 ROUTINE GENERAL MEDICAL EXAMINATION AT A HEALTH CARE FACILITY: Primary | ICD-10-CM

## 2021-12-07 DIAGNOSIS — I10 ESSENTIAL HYPERTENSION, BENIGN: ICD-10-CM

## 2021-12-07 DIAGNOSIS — H90.3 SENSORINEURAL HEARING LOSS (SNHL) OF BOTH EARS: ICD-10-CM

## 2021-12-07 PROCEDURE — G0439 PPPS, SUBSEQ VISIT: HCPCS | Performed by: FAMILY MEDICINE

## 2021-12-07 RX ORDER — LISINOPRIL 10 MG/1
10 TABLET ORAL DAILY
Qty: 90 TABLET | Refills: 0
Start: 2021-12-07 | End: 2022-01-12

## 2021-12-07 NOTE — PATIENT INSTRUCTIONS
Personalized Preventive Plan for Keith Abraham - 12/7/2021  Medicare offers a range of preventive health benefits. Some of the tests and screenings are paid in full while other may be subject to a deductible, co-insurance, and/or copay. Some of these benefits include a comprehensive review of your medical history including lifestyle, illnesses that may run in your family, and various assessments and screenings as appropriate. After reviewing your medical record and screening and assessments performed today your provider may have ordered immunizations, labs, imaging, and/or referrals for you. A list of these orders (if applicable) as well as your Preventive Care list are included within your After Visit Summary for your review. Other Preventive Recommendations:    · A preventive eye exam performed by an eye specialist is recommended every 1-2 years to screen for glaucoma; cataracts, macular degeneration, and other eye disorders. · A preventive dental visit is recommended every 6 months. · Try to get at least 150 minutes of exercise per week or 10,000 steps per day on a pedometer . · Order or download the FREE \"Exercise & Physical Activity: Your Everyday Guide\" from [employer]. Call 0-481.480.5208 or search [employer] online. · You need 6386-7445 mg of calcium and 5932-5066 IU of vitamin D per day. It is possible to meet your calcium requirement with diet alone, but a vitamin D supplement is usually necessary to meet this goal.  · When exposed to the sun, use a sunscreen that protects against both UVA and UVB radiation with an SPF of 30 or greater. Reapply every 2 to 3 hours or after sweating, drying off with a towel, or swimming. · Always wear a seat belt when traveling in a car. Always wear a helmet when riding a bicycle or motorcycle.

## 2021-12-07 NOTE — PROGRESS NOTES
Medicare Annual Wellness Visit  Name: Agustina Felder Date: 2021   MRN: <[id number]> Sex: Male   Age: 80 y.o. Ethnicity: Non- / Non    : 1935 Race: White (non-)      Carlita Good is here for Medicare AWV    Screenings for behavioral, psychosocial and functional/safety risks, and cognitive dysfunction are all negative except as indicated below. These results, as well as other patient data from the [address] form, are documented in Flowsheets linked to this Encounter. Allergies   Allergen Reactions    Percodan [Oxycodone-Aspirin] Nausea Only     Outpatient Medications Marked as Taking for the 21 encounter (Office Visit) with Ursula Jane MD   Medication Sig Dispense Refill    lisinopril (PRINIVIL;ZESTRIL) 5 MG tablet TAKE 1 TABLET DAILY 90 tablet 0    TOVIAZ 8 MG TB24 TAKE 1 TABLET DAILY 90 tablet 0    simvastatin (ZOCOR) 20 MG tablet TAKE 1 TABLET NIGHTLY 90 tablet 3    linaclotide (LINZESS) 145 MCG capsule Take 1 capsule by mouth every morning (before breakfast) 90 capsule 1    Multiple Vitamins-Minerals (CENTRUM SILVER 50+MEN PO) Take 1 tablet by mouth      vitamin B-12 (CYANOCOBALAMIN) 1000 MCG tablet Take 1,000 mcg by mouth daily      clobetasol (TEMOVATE) 0.05 % cream Apply topically 2 times daily.  30 g 0    BIOTIN 5000 PO Take by mouth          Past Medical History:   Diagnosis Date    Arthritis     MILD    Bunion 2011    Burn     RT wrist    Cataract 2014    Essential hypertension, benign 2013    Hammer toe 2011    Hyperlipidemia     Inguinal hernia 2011       Past Surgical History:   Procedure Laterality Date    CATARACT REMOVAL Bilateral May '14    DENTAL SURGERY      DENTAL SURGERY  2020    Dental implant     EYE SURGERY      HERNIA REPAIR      Left inguinal    INGUINAL HERNIA REPAIR      LEFT    INGUINAL HERNIA REPAIR  2011    Right    VARICOSE VEIN SURGERY      VARICOSE VEIN SURGERY      BILATERAL LEGS       Family History   Problem Relation Age of Onset    Breast Cancer Mother 52    Heart Attack Father 72    Lung Cancer Sister        CareTeam (Including outside providers/suppliers regularly involved in providing care):   Patient Care Team:  Poncho Jarvis MD as PCP - General (Family Medicine)  Poncho Jarvis MD as PCP - [facility] Empaneled Provider  Tarik Majano MD as Consulting Physician (Otolaryngology)    Wt Readings from Last 3 Encounters:   12/07/21 162 lb 9.6 oz (73.8 kg)   08/31/21 163 lb 9.6 oz (74.2 kg)   12/04/20 161 lb (73 kg)     Vitals:    12/07/21 1144   BP: (!) 152/86   Pulse: 79   Resp: 14   Temp: 97.8 °F (36.6 °C)   TempSrc: Temporal   SpO2: 98%   Weight: 162 lb 9.6 oz (73.8 kg)   Height: 5' 8\" (1.727 m)     BP Readings from Last 3 Encounters:   12/07/21 (!) 152/86   08/31/21 (!) 173/76   12/04/20 132/78      Wt Readings from Last 3 Encounters:   12/07/21 162 lb 9.6 oz (73.8 kg)   08/31/21 163 lb 9.6 oz (74.2 kg)   12/04/20 161 lb (73 kg)      Body mass index is 24.72 kg/m². Based upon direct observation of the patient, evaluation of cognition reveals recent and remote memory intact.     General Appearance: alert and oriented to person, place and time, well developed and well- nourished, in no acute distress  Skin: warm and dry, no rash or erythema  Head: normocephalic and atraumatic  Eyes: pupils equal, round, and reactive to light, extraocular eye movements intact, conjunctivae normal  ENT: tympanic membrane, external ear and ear canal normal bilaterally, nose without deformity, nasal mucosa and turbinates normal without polyps  Neck: supple and non-tender without mass, no thyromegaly or thyroid nodules, no cervical lymphadenopathy  Pulmonary/Chest: clear to auscultation bilaterally- no wheezes, rales or rhonchi, normal air movement, no respiratory distress  Cardiovascular: normal rate, regular rhythm, normal S1 and S2, no murmurs, rubs, clicks, or gallops, distal pulses intact, no carotid bruits  Abdomen: soft, non-tender, non-distended, normal bowel sounds, no masses or organomegaly  Extremities: no cyanosis, clubbing or edema  Musculoskeletal: normal range of motion, no joint swelling, deformity or tenderness  Neurologic: reflexes normal and symmetric, no cranial nerve deficit, gait, coordination and speech normal    Patient's complete Health Risk Assessment and screening values have been reviewed and are found in Flowsheets. The following problems were reviewed today and where indicated follow up appointments were made and/or referrals ordered. Positive Risk Factor Screenings with Interventions:           Health Habits/Nutrition:  Health Habits/Nutrition  Do you exercise for at least 20 minutes 2-3 times per week?: Yes  Have you lost any weight without trying in the past 3 months?: (!) Yes  Do you eat only one meal per day?: No  Have you seen the dentist within the past year?: Yes  Body mass index: 24.72  Health Habits/Nutrition Interventions:  · weight stable on our scales. he notes 10 lb loss over 10 years.     Hearing/Vision:  No exam data present  Hearing/Vision  Do you or your family notice any trouble with your hearing that hasn't been managed with hearing aids?: (!) Yes  Do you have difficulty driving, watching TV, or doing any of your daily activities because of your eyesight?: No  Have you had an eye exam within the past year?: (!) No  Hearing/Vision Interventions:  · Hearing concerns:  audiology referral provided      Personalized Preventive Plan   Current Health Maintenance Status  Immunization History   Administered Date(s) Administered    COVID-19, [place], PF, 30mcg/0.3mL 01/21/2021, 02/10/2021, 09/29/2021    Influenza Vaccine, unspecified formulation 09/13/2018    Influenza, High Dose (Fluzone 65 yrs and older) 10/08/2014, 09/27/2016, 09/19/2017, 09/12/2018, 09/03/2019, 09/29/2021    Influenza, Quadv, adjuvanted, 65 yrs +, IM, PF (Fluad) 11/17/2020    MMR 05/01/2013    Pneumococcal Conjugate 13-valent ([id number]) 09/24/2015    Pneumococcal Polysaccharide ([id number]) 12/31/1999, 11/09/2011    Td, unspecified formulation 11/09/2009    Tdap (Boostrix, Adacel) 10/09/2016    Zoster Live (Zostavax) 11/01/2007    Zoster Recombinant (Shingrix) 07/15/2019        Health Maintenance   Topic Date Due    Annual Wellness Visit (AWV)  12/05/2021    Lipid screen  12/02/2022    Potassium monitoring  12/02/2022    Creatinine monitoring  12/02/2022    DTaP/Tdap/Td vaccine (2 - Td or Tdap) 10/09/2026    Flu vaccine  Completed    Shingles Vaccine  Completed    Pneumococcal 65+ years Vaccine  Completed    COVID-19 Vaccine  Completed    Hepatitis A vaccine  Aged Out    Hepatitis B vaccine  Aged Out    Hib vaccine  Aged Out    Meningococcal (ACWY) vaccine  Aged Out     Recommendations for [employer] Due: see orders and patient instructions/AVS.  . Recommended screening schedule for the next 5-10 years is provided to the patient in written form: see Patient Instructions/AVS.    Kun Griffin was seen today for medicare awv. Diagnoses and all orders for this visit:    Routine general medical examination at a health care facility    Sensorineural hearing loss (SNHL) of both ears  -     [id number] [facility],[address], [place], [place]. D., Audiology, [facility]    Essential hypertension, benign  -     lisinopril (PRINIVIL;ZESTRIL) 10 MG tablet; Take 1 tablet by mouth daily  Not at goal < 130/80  Increase Lisinopril to 10 mg  DASH diet. Follow up in 4 weeks or prn. Advance Care Planning   Advanced Care Planning: Discussed the patients choices for care and treatment in case of a health event that adversely affects decision-making abilities. Also discussed the patients long-term treatment options.  Reviewed with the patient the [address] [place] [address] of [address] [place] [address] Declaration forms  Reviewed the process of designating a competent adult as an Agent (or -in-fact) that could take make health care decisions for the patient if incompetent. Patient was asked to complete the declaration forms, either acknowledge the forms by a public notary or an eligible witness and provide a signed copy to the practice office. Time spent (minutes): 2   On file. No change      Lab Results   Component Value Date     12/02/2021    K 4.5 12/02/2021     12/02/2021    CO2 25 12/02/2021    BUN 16 12/02/2021    CREATININE 0.8 12/02/2021    GLUCOSE 94 12/02/2021    CALCIUM 9.8 12/02/2021    PROT 6.6 12/02/2021    LABALBU 4.2 12/02/2021    BILITOT 0.6 12/02/2021    ALKPHOS 64 12/02/2021    AST 18 12/02/2021    ALT 9 (L) 12/02/2021    LABGLOM >60 12/02/2021    GFRAA >60 12/02/2021    AGRATIO 1.8 12/02/2021    GLOB 2.5 12/02/2020        Lab Results   Component Value Date    CHOL 154 12/02/2021    CHOL 157 12/02/2020    CHOL 149 10/02/2019     Lab Results   Component Value Date    TRIG 80 12/02/2021    TRIG 75 12/02/2020    TRIG 77 10/02/2019     Lab Results   Component Value Date    HDL 68 (H) 12/02/2021    HDL 69 (H) 12/02/2020    HDL 80 (H) 10/02/2019     Lab Results   Component Value Date    LDLCALC 70 12/02/2021    LDLCALC 73 12/02/2020    LDLCALC 54 10/02/2019     Lab Results   Component Value Date    LABVLDL 16 12/02/2021    LABVLDL 15 12/02/2020    LABVLDL 15 10/02/2019     No results found for: CHOLHDLRATIO  . TSH   Date Value Ref Range Status   12/02/2021 2.65 0.27 - 4.20 uIU/mL Final   12/02/2020 3.15 0.27 - 4.20 uIU/mL Final   06/18/2019 1.47 0.27 - 4.20 uIU/mL Final   05/09/2013 3.01 0.35 - 5.5 uIU/ml Final   05/04/2012 3.39 0.35 - 5.5 uIU/ml Final   11/09/2011 2.22 0.35 - 5.5 uIU/ml Final         Cardiovascular Disease Risk Counseling: Assessed the patient's risk to develop cardiovascular disease and reviewed main risk factors.    Reviewed steps to reduce disease risk including:   · Quitting tobacco use, reducing amount smoked, or not starting the habit  · Making healthy food choices  · Being physically active and gradualy increasing activity levels   · Reduce weight and determine a healthy BMI goal  · Monitor blood pressure and treat if higher than 140/90 mmHg  · Maintain blood total cholesterol levels under 5 mmol/l or 190 mg/dl  · Maintain LDL cholesterol levels under 3.0 mmol/l or 115 mg/dl   · Control blood glucose levels  Provided a follow up plan.   Time spent (minutes): 5

## 2021-12-20 ENCOUNTER — PATIENT MESSAGE (OUTPATIENT)
Dept: FAMILY MEDICINE CLINIC | Age: 86
End: 2021-12-20

## 2021-12-20 DIAGNOSIS — I10 ESSENTIAL HYPERTENSION, BENIGN: ICD-10-CM

## 2021-12-20 DIAGNOSIS — I10 ESSENTIAL HYPERTENSION, BENIGN: Primary | ICD-10-CM

## 2021-12-20 RX ORDER — HYDROCHLOROTHIAZIDE 12.5 MG/1
12.5 CAPSULE, GELATIN COATED ORAL EVERY MORNING
Qty: 30 CAPSULE | Refills: 2 | Status: SHIPPED | OUTPATIENT
Start: 2021-12-20 | End: 2021-12-21

## 2021-12-21 RX ORDER — HYDROCHLOROTHIAZIDE 12.5 MG/1
12.5 CAPSULE, GELATIN COATED ORAL EVERY MORNING
Qty: 90 CAPSULE | Refills: 3 | Status: SHIPPED | OUTPATIENT
Start: 2021-12-21 | End: 2022-01-12

## 2022-01-10 NOTE — PROGRESS NOTES
Subjective:      Patient ID: Juli Russell 80 y.o. male. The encounter diagnosis was Essential hypertension, benign. HPI    Hypertension:  HCTZ was added a few weeks ago for home -170. Since on HCTZ  110/64  To 137/79  Patient here for follow-up of elevated blood pressure. He is exercising and is adherent to low salt diet. Blood pressure is well controlled at home. Cardiac symptoms none. Patient denies chest pain, dyspnea, exertional chest pressure/discomfort, lower extremity edema, palpitations and paroxysmal nocturnal dyspnea. Cardiovascular risk factors: advanced age (older than 54 for men, 72 for women), dyslipidemia, hypertension and male gender. Use of agents associated with hypertension: none. History of target organ damage: none. Continues to have urinary frequency and nocturia. Urgency and urge incontinence at times  Is on Toviaz. Dr. Jason Wilkinson told him to consider laser prostate surgery but most sxs from small bladder. He is worried about memory. Forgets names, numbers. Sometimes forgets how to use micowave.      Lab Results   Component Value Date     12/02/2021    K 4.5 12/02/2021     12/02/2021    CO2 25 12/02/2021    BUN 16 12/02/2021    CREATININE 0.8 12/02/2021    GLUCOSE 94 12/02/2021    CALCIUM 9.8 12/02/2021       Outpatient Medications Marked as Taking for the 1/12/22 encounter (Office Visit) with Diana Lancaster MD   Medication Sig Dispense Refill    amoxicillin (AMOXIL) 500 MG capsule TAKE ONE CAPSULE BY MOUTH EVERY 6 HOURS FOR 10 DAYS      hydroCHLOROthiazide (MICROZIDE) 12.5 MG capsule TAKE 1 CAPSULE BY MOUTH EVERY MORNING 90 capsule 3    lisinopril (PRINIVIL;ZESTRIL) 10 MG tablet Take 1 tablet by mouth daily 90 tablet 0    TOVIAZ 8 MG TB24 TAKE 1 TABLET DAILY 90 tablet 0    simvastatin (ZOCOR) 20 MG tablet TAKE 1 TABLET NIGHTLY 90 tablet 3    linaclotide (LINZESS) 145 MCG capsule Take 1 capsule by mouth every morning (before breakfast) 90 capsule 1    Multiple Vitamins-Minerals (CENTRUM SILVER 50+MEN PO) Take 1 tablet by mouth      vitamin B-12 (CYANOCOBALAMIN) 1000 MCG tablet Take 1,000 mcg by mouth daily      clobetasol (TEMOVATE) 0.05 % cream Apply topically 2 times daily. 30 g 0    BIOTIN 5000 PO Take by mouth          Allergies   Allergen Reactions    Percodan [Oxycodone-Aspirin] Nausea Only       Patient Active Problem List   Diagnosis    Overactive bladder    Pure hypercholesterolemia    Lichen planus    Essential hypertension, benign    Angioma    Dry mouth    Benign non-nodular prostatic hyperplasia with lower urinary tract symptoms    Eczema, dyshidrotic       Past Medical History:   Diagnosis Date    Arthritis     MILD    Bunion 2011    Burn     RT wrist    Cataract 2014    Essential hypertension, benign 2013    Hammer toe 2011    Hyperlipidemia     Inguinal hernia 2011       Past Surgical History:   Procedure Laterality Date    CATARACT REMOVAL Bilateral May '14    DENTAL SURGERY      DENTAL SURGERY  2020    Dental implant     EYE SURGERY      HERNIA REPAIR      Left inguinal    INGUINAL HERNIA REPAIR      LEFT    INGUINAL HERNIA REPAIR  2011    Right    VARICOSE VEIN SURGERY  2008    VARICOSE VEIN SURGERY      BILATERAL LEGS        Family History   Problem Relation Age of Onset    Breast Cancer Mother 52    Heart Attack Father 72    Lung Cancer Sister        Social History     Tobacco Use    Smoking status: Former Smoker     Years: 5.00     Types: Pipe     Quit date: 1965     Years since quittin.0    Smokeless tobacco: Never Used   Substance Use Topics    Alcohol use:  Yes     Alcohol/week: 3.0 standard drinks     Types: 3 Glasses of wine, 4 Standard drinks or equivalent per week     Comment: in a week    Drug use: No            Review of Systems  Review of Systems    Objective:   Physical Exam  Vitals:    22 1051   BP: 130/72   Pulse: 99   Resp: 13   Temp: 97.6 °F (36.4 °C)   TempSrc: Temporal   SpO2: 99%   Weight: 160 lb (72.6 kg)   Height: 5' 6.5\" (1.689 m)       Physical Exam    NAD    Skin is warm and dry. No rash. Well hydrated  Alert and oriented x 3. Mood and affect are normal.  The neck is supple and free of adenopathy or masses, the thyroid is normal without enlargement or nodules. Chest: clear with no wheezes or rales. No retractions, or use of accessory muscles noted. Cardiovascular: PMI is not displaced, and no thrill noted. Regular rate and rhythm with no rub, murmur or gallop. No peripheral edema. The abdomen is soft without tenderness, guarding, mass, rebound or organomegaly. Aorta, femoral, DP and PT pulses intact. Assessment:       Diagnosis Orders   1. Essential hypertension, benign  lisinopril-hydroCHLOROthiazide (PRINZIDE;ZESTORETIC) 10-12.5 MG per tablet    Basic Metabolic Panel  At goal < 130/80    2. Overactive bladder  ZAY - Rae Jacobs MD, Urology, [facility]  Consider stopping Angelica Flight due to memory. Myrbetriq discussed   3. Memory loss  Life style, diet, exercise addressed              Plan:      Side effects of current medications reviewed and questions answered. Follow up in 6 months or prn.

## 2022-01-12 ENCOUNTER — OFFICE VISIT (OUTPATIENT)
Dept: FAMILY MEDICINE CLINIC | Age: 87
End: 2022-01-12
Payer: MEDICARE

## 2022-01-12 ENCOUNTER — TELEPHONE (OUTPATIENT)
Dept: DERMATOLOGY | Age: 87
End: 2022-01-12

## 2022-01-12 VITALS
SYSTOLIC BLOOD PRESSURE: 130 MMHG | OXYGEN SATURATION: 99 % | TEMPERATURE: 97.6 F | HEIGHT: 67 IN | BODY MASS INDEX: 25.11 KG/M2 | DIASTOLIC BLOOD PRESSURE: 72 MMHG | WEIGHT: 160 LBS | HEART RATE: 99 BPM | RESPIRATION RATE: 13 BRPM

## 2022-01-12 DIAGNOSIS — R41.3 MEMORY LOSS: ICD-10-CM

## 2022-01-12 DIAGNOSIS — N32.81 OVERACTIVE BLADDER: ICD-10-CM

## 2022-01-12 DIAGNOSIS — I10 ESSENTIAL HYPERTENSION, BENIGN: Primary | ICD-10-CM

## 2022-01-12 PROCEDURE — 99214 OFFICE O/P EST MOD 30 MIN: CPT | Performed by: FAMILY MEDICINE

## 2022-01-12 RX ORDER — LISINOPRIL AND HYDROCHLOROTHIAZIDE 12.5; 1 MG/1; MG/1
1 TABLET ORAL DAILY
Qty: 90 TABLET | Refills: 3 | Status: SHIPPED | OUTPATIENT
Start: 2022-01-12

## 2022-01-12 RX ORDER — AMOXICILLIN 500 MG/1
CAPSULE ORAL
COMMUNITY
Start: 2022-01-05 | End: 2022-07-17

## 2022-01-12 NOTE — TELEPHONE ENCOUNTER
Pt of Dr. Casey Drafts   Pt is requesting for a sooner appt than his schedule appt. Pt states he has scabs om his scalp that wont go away, states from last OV which was in Nov. He is looking for any suggestions to help with this or if he needed to come in and be seen by .    Please jcarlos back to discuss   C/B# [phone]

## 2022-01-13 NOTE — TELEPHONE ENCOUNTER
Called and left message for patient to call back office. Please offer opening @ 1pm if still available.

## 2022-01-21 DIAGNOSIS — I10 ESSENTIAL HYPERTENSION, BENIGN: ICD-10-CM

## 2022-01-21 LAB
ANION GAP SERPL CALCULATED.3IONS-SCNC: 9 MMOL/L (ref 3–16)
BUN BLDV-MCNC: 22 MG/DL (ref 7–20)
CALCIUM SERPL-MCNC: 9.7 MG/DL (ref 8.3–10.6)
CHLORIDE BLD-SCNC: 102 MMOL/L (ref 99–110)
CO2: 27 MMOL/L (ref 21–32)
CREAT SERPL-MCNC: 0.8 MG/DL (ref 0.8–1.3)
GFR AFRICAN AMERICAN: >60
GFR NON-AFRICAN AMERICAN: >60
GLUCOSE BLD-MCNC: 98 MG/DL (ref 70–99)
POTASSIUM SERPL-SCNC: 4.7 MMOL/L (ref 3.5–5.1)
SODIUM BLD-SCNC: 138 MMOL/L (ref 136–145)

## 2022-01-26 DIAGNOSIS — E78.00 PURE HYPERCHOLESTEROLEMIA: ICD-10-CM

## 2022-01-26 RX ORDER — SIMVASTATIN 20 MG
TABLET ORAL
Qty: 90 TABLET | Refills: 3 | Status: SHIPPED | OUTPATIENT
Start: 2022-01-26

## 2022-01-26 NOTE — TELEPHONE ENCOUNTER
Requested Prescriptions     Pending Prescriptions Disp Refills    simvastatin (ZOCOR) 20 MG tablet [Pharmacy Med Name: SIMVASTATIN TABS 20MG] 90 tablet 3     Sig: TAKE 1 TABLET NIGHTLY       LOV 1/12/2022  Nov 7/12/22  Labs 12/2/21

## 2022-02-23 ENCOUNTER — OFFICE VISIT (OUTPATIENT)
Dept: DERMATOLOGY | Age: 87
End: 2022-02-23
Payer: MEDICARE

## 2022-02-23 VITALS — TEMPERATURE: 97.3 F

## 2022-02-23 DIAGNOSIS — D48.5 NEOPLASM OF UNCERTAIN BEHAVIOR OF SKIN: ICD-10-CM

## 2022-02-23 DIAGNOSIS — L82.0 INFLAMED SEBORRHEIC KERATOSIS: Primary | ICD-10-CM

## 2022-02-23 PROCEDURE — 17110 DESTRUCTION B9 LES UP TO 14: CPT | Performed by: DERMATOLOGY

## 2022-02-23 PROCEDURE — 11102 TANGNTL BX SKIN SINGLE LES: CPT | Performed by: DERMATOLOGY

## 2022-02-23 RX ORDER — MIRABEGRON 50 MG/1
TABLET, FILM COATED, EXTENDED RELEASE ORAL
COMMUNITY
Start: 2022-02-21

## 2022-02-23 NOTE — PATIENT INSTRUCTIONS
Biopsy Wound Care Instructions    · Keep the bandage in place for 24 hours. · Cleanse the wound with mild soapy water daily   Gently dry the area.  Apply Vaseline or petroleum jelly to the wound using a cotton tipped applicator.  Cover with a clean bandage.  Repeat this process until the biopsy site is healed.  If you had stitches placed, continue treating the site until the stitches are removed. Remember to make an appointment to return to have your stitches removed by our staff.  You may shower and bathe as usual.       ** Biopsy results generally take around 7 business days to come back. If you have not heard from us by then, please call the office at [phone]. *Please note that biopsy results are released to both the patient and physician at the same time in [address] Please allow time for your physician to review the results. One of our staff members will reach out to you with the results and plan.

## 2022-02-23 NOTE — PROGRESS NOTES
[facility]at[facility]  [facility] [address]  1935    80 y.o. male     Date of Visit: 2/23/2022    Chief Complaint: skin lesions    History of Present Illness:    1. He presents today for multiple growths on the scalp that are painful and intermittently bleed with combing of the hair. 2.  Unknown duration of a nonhealing lesion on the mid vertex scalp. Review of Systems:  Gen: Feels well, good sense of health. Past Medical History, Family History, Surgical History, Medications and Allergies reviewed.     Past Medical History:   Diagnosis Date    Arthritis     MILD    Bunion 7/29/2011    Burn     RT wrist    Cataract 5/12/2014    Essential hypertension, benign 12/2/2013    Hammer toe 7/29/2011    Hyperlipidemia     Inguinal hernia 7/29/2011     Past Surgical History:   Procedure Laterality Date    CATARACT REMOVAL Bilateral May '14    DENTAL SURGERY      DENTAL SURGERY  03/04/2020    Dental implant     EYE SURGERY      HERNIA REPAIR  1980    Left inguinal    INGUINAL HERNIA REPAIR      LEFT    INGUINAL HERNIA REPAIR  08/24/2011    Right    VARICOSE VEIN SURGERY  2008    VARICOSE VEIN SURGERY      BILATERAL LEGS       Allergies   Allergen Reactions    Percodan [Oxycodone-Aspirin] Nausea Only     Outpatient Medications Marked as Taking for the 2/23/22 encounter (Office Visit) with Juan Baum MD   Medication Sig Dispense Refill    MYRBETRIQ 50 MG TB24 TAKE 1 TABLET BY MOUTH EVERY DAY      simvastatin (ZOCOR) 20 MG tablet TAKE 1 TABLET NIGHTLY 90 tablet 3    amoxicillin (AMOXIL) 500 MG capsule TAKE ONE CAPSULE BY MOUTH EVERY 6 HOURS FOR 10 DAYS      lisinopril-hydroCHLOROthiazide (PRINZIDE;ZESTORETIC) 10-12.5 MG per tablet Take 1 tablet by mouth daily 90 tablet 3    linaclotide (LINZESS) 145 MCG capsule Take 1 capsule by mouth every morning (before breakfast) 90 capsule 1    Multiple Vitamins-Minerals (CENTRUM SILVER 50+MEN PO) Take 1 tablet by mouth      vitamin B-12 (CYANOCOBALAMIN) 1000 MCG tablet Take 1,000 mcg by mouth daily      clobetasol (TEMOVATE) 0.05 % cream Apply topically 2 times daily. 30 g 0    BIOTIN 5000 PO Take by mouth             Physical Examination       The following were examined and determined to be normal: Psych/Neuro, Head/face, Conjunctivae/eyelids, Gums/teeth/lips, Neck, Breast/axilla/chest, Abdomen, Back, RUE, LUE, RLE, LLE and Nails/digits. The following were examined and determined to be abnormal: Scalp/hair. Well-appearing. 1.  Crown of the scalp-5, left occipital scalp-1: Multiple verrucous pink-brown papules. 2.  Mid vertex scalp with a 6 mm ulcerated telangiectatic pearly papule. Assessment and Plan     1. Inflamed seborrheic keratosis     2 cycles of liquid nitrogen applied to 5 ISKs on the scalp and 1 on the left occipital scalp. Patient was educated regarding the potential risks of blister formation and discomfort. Wound care was discussed. 2. Neoplasm of uncertain behavior of skin, mid vertex scalp - ? BCC    Discussed possible diagnosis; patient agreeable to biopsy (consent obtained). Risks reviewed including discomfort, bleeding, scar and infection. The area(s) to be biopsied were marked with a surgical pen. Alcohol was used to cleanse the site. Local anesthesia was acheived with 1% lidocaine with epinephrine. Shave biopsy was performed using a razor blade. Hemostasis was achieved with aluminum chloride. The wound(s) were dressed with petrolatum and covered with a bandage. Wound care instructions were reviewed. 1 Specimen (s) sent to pathology. The specimen bottles were appropriately labeled. Return in about 6 months (around 8/23/2022).     --Sussy Acosta MD

## 2022-02-25 DIAGNOSIS — C44.41 BASAL CELL CARCINOMA (BCC) OF SCALP: Primary | ICD-10-CM

## 2022-02-25 LAB — DERMATOLOGY PATHOLOGY REPORT: ABNORMAL

## 2022-03-09 ENCOUNTER — PROCEDURE VISIT (OUTPATIENT)
Dept: SURGERY | Age: 87
End: 2022-03-09
Payer: MEDICARE

## 2022-03-09 VITALS — DIASTOLIC BLOOD PRESSURE: 50 MMHG | TEMPERATURE: 97.8 F | HEART RATE: 75 BPM | SYSTOLIC BLOOD PRESSURE: 105 MMHG

## 2022-03-09 DIAGNOSIS — C44.41 BASAL CELL CARCINOMA OF SCALP: Primary | ICD-10-CM

## 2022-03-09 PROCEDURE — 12032 INTMD RPR S/A/T/EXT 2.6-7.5: CPT | Performed by: DERMATOLOGY

## 2022-03-09 PROCEDURE — 17311 MOHS 1 STAGE H/N/HF/G: CPT | Performed by: DERMATOLOGY

## 2022-03-09 NOTE — PROGRESS NOTES
MOHS PROCEDURE NOTE    PHYSICIAN:  Valorie Maldonado. Maddy Lane MD, Who operated in two distinct and integrated capacities as the surgeon removing the tissue and as the pathologist examining the tissue. ASSISTANT: Janelle Patricia RN       REFERRING PROVIDER:   Julia Burrell MD      PREOPERATIVE DIAGNOSIS: Nodular Basal Cell Carcinoma     SPECIFIC MOHS INDICATIONS:  location and need for tissue conservation    AUC SCORIN    POSTOPERATIVE DIAGNOSIS: SAME    LOCATION: Mid Vertex Scalp     OPERATIVE PROCEDURE:  MOHS MICROGRAPHIC SURGERY    RECONSTRUCTION OF DEFECT: Intermediate layered closure    PREOPERATIVE SIZE: 11 x 8 MM    DEFECT SIZE: 12 x 10 MM    LENGTH OF REPAIRED WOUND/SIZE OF FLAP/SIZE OF GRAFT:  35 MM    ANESTHESIA:  5mL 1% lidocaine with epinephrine 1:100,000 buffered. EBL:  MINIMAL    DURATION OF PROCEDURE:  2 HOURS    POSTOPERATIVE OBSERVATION: 30 Mins HOUR    SPECIMENS:  SEE MOHS MAP    COMPLICATIONS:  NONE    DESCRIPTION OF PROCEDURE:  The patient was given a mirror, as appropriate, and the biopsy site was identified, marked with a surgical marking pen, and verified by the patient. Options for treatment were discussed and the patient was informed that Mohs surgery was the selected treatment based on its lower recurrence rate, given the features listed above, as compared to other treatment modalities such as excision, radiation, or curettage, and agreed with this treatment plan. Risks and benefits including bruising, swelling, bleeding, infection, nerve injury, recurrence, and scarring were discussed with the patient prior to the procedure and a written consent detailing these and other risks was reviewed with the patient and signed. There was a time out for person and procedure verification. The surgical site was prepped with an antiseptic solution. Application of an antiseptic solution was repeated before each surgical stage.       Stage I:  The clinically-apparent tumor was carefully defined and debulked, determining the edge of the surgical excision. A thin layer of tumor-laden tissue was excised with a narrow margin of normal-appearing skin, using the technique of Mohs. A map was prepared to correspond to the area of skin from which it was excised. Hemostasis was achieved using electrosurgery. The wound was bandaged. The tissue was prepared for the cryostat and sectioned. 1 section(s) prepared. Each section was coded, cut, and stained for microscopic examination. The entire base and margins of the excised piece of tissue were examined by the surgeon. The tissue was examined to the level of subcutaneous fat. No tumor was identified at the peripheral margins of stage I of microscopically controlled surgery. DEFECT MANAGEMENT:    REPAIR DESCRIPTION:  Various closure modalities were discussed with the patient, and it was decided that an intermediate layered repair would best preserve normal anatomic and functional relationships. Additional risk of wound dehiscence was discussed. The area was anesthetized with 1% lidocaine with epinephrine 1:100,000 buffered, was given a sterile prep using Chlorhexidine gluconate 4% solution and draped in the usual sterile fashion. Recreation and enlargement of the wound was performed by excising cones of tissue via the triangulation technique. The final incision lines were placed with respect for the patient's natural skin tension lines in a linear configuration to avoid functional and aesthetic distortion of adjacent free margins. Following minimal undermining, meticulous hemostasis was obtained with spot monopolar electrocoagulation. Subcutaneous dead space and dermis were closed using 4-0 Vicryl buried subcutaneous interrupted suture and the epidermis was approximated with 4-0 Prolene running epidermal sutures.            WOUND COVERAGE:  The wound was cleaned with normal saline solution, dried off, Aquaphor ointment was applied, and the wound was covered. A dressing was applied for stabilization and light pressure. The patient was given detailed oral and written instructions on postoperative care. There were no complications. The patient left the Unit in good medical condition. FOLLOW-UP:  The patient will return for suture removal in 14-21 days.

## 2022-03-09 NOTE — PROGRESS NOTES
PRE-PROCEDURE SCREENING    Pacemaker/ICD: No  Difficulty with numbing in the past: No  Local Anesthesia Reaction/passing out: No  Latex or adhesive allergy:  No  Bleeding/Clotting Disorders: No  Anticoagulant Therapy: No  Joint prosthesis: No  Artificial Heart Valve: Yes and No  Stroke or Seizures: No  Organ Transplant or Lymphoma: No  Immunosuppression: No  Respiratory Problems: No

## 2022-03-09 NOTE — PATIENT INSTRUCTIONS
[facility] Office Hours:    Monday-Thursday  7:30 AM-4:30 PM    Friday  9:00 AM-1:00 PM      POST-OPERATIVE CARE FOR STICHES  Bandage change after 48 hours    CARING FOR YOUR SURGICAL SITE  The bandage should remain on and completely dry for 48 hours. Do NOT get the bandage wet. 1. After the first 48 hours, gently remove the remaining part of the bandage. It can be helpful to moisten the bandage edges in the shower. Steri strips may still be on the wound. It is ok, they will fall off slowly with the daily bandage changes. 2. Gently clean the wound daily with mild soap and water. Try to clean off crust and debris. 3. Dry (pat) the area with a clean Q-tip or gauze. 4. Apply a layer of Vaseline/ Aquaphor (or Bacitracin if your doctor recommends) to the wound area only. 5. Cut a piece of Telfa (or any non-stick dressing) to fit just over the wound and secure it with paper tape. If the wound is small you may use a Band- Aid. Keep area covered for a total of 2 week(s). If the dressing comes off or if you have questions, or concerns about the dressing, please call the office for instructions! POST OPERATIVE INSTRUCTIONS    1. Activity: Do not lift anything heavier than a gallon of milk for 1 week. Also, avoid strenuous activity such as running, power walking or contact sports. 2. Eating and drinking: Do not drink alcohol for 48 hours after your procedure. Alcohol increases the chances of bleeding. 3. Medicines   -If you have discomfort, take Acetaminophen (Tylenol or Extra Strength Tylenol). Follow the instructions and warning on the bottle. -If your doctor has prescribed you an Aspirin daily, please keep taking it. Do not take extra Aspirin or medicines containing Aspirin (such as [place] and Excedrin) for 48 hours after your procedure. Bleeding: If bleeding occurs, DO NOT remove the bandage. Put firm pressure on the area with gauze for 20 minutes without peeking.  If the

## 2022-03-10 ENCOUNTER — TELEPHONE (OUTPATIENT)
Dept: SURGERY | Age: 87
End: 2022-03-10

## 2022-03-10 NOTE — TELEPHONE ENCOUNTER
The patient was in the office on 3/9/22 for Mohs located on the mid vertex scalp with ILC repair. The patient tolerated the procedure well and left the office in good condition. Pain level on post-operative day 1:  Pt states pain is adequate. Is taking Extra strength tylenol PRN for pain. Any bleeding episode that required pressure to be held, bandage change or a call to the office or MD?  no     Any other issues?:  no    A post-operative telephone call was placed at 3/10/2022 at 2:15 PM   in order to check on the patient's recovery process. The patient reported doing well and had no complaints other than those listed above, if any. All of the patient's questions were answered.

## 2022-03-23 ENCOUNTER — NURSE ONLY (OUTPATIENT)
Dept: SURGERY | Age: 87
End: 2022-03-23

## 2022-03-23 DIAGNOSIS — Z48.02 VISIT FOR SUTURE REMOVAL: Primary | ICD-10-CM

## 2022-03-23 NOTE — PATIENT INSTRUCTIONS
[facility] Office Hours:    Monday-Thursday  7:30 AM-4:30 PM    Friday  9:00 AM-1:00 PM    WOUND CARE AFTER SUTURE REMOVAL    After your stiches have been removed, your scar is still very fragile. In fact, scars continue to change and evolve, what we call remodel, for about a year after your procedure. Follow the following steps below to ensure that your scar heals well. Instructions    1. If Steri-strips were applied, keep them on until they fall off on their own. 2. Protect your scar from the sun. Use a sunscreen or bandage to cover your scar. Ivette Purl exposure can cause your scar to become discolored and appear red or brown. 3. To help soften your scar more rapidly, it is helpful, but not necessary, for you to   massage the scar gently each night for twenty minutes. 4. Spitting suture. Occasionally, an inside suture (stitch) does not completely dissolve. When this happens, (generally 4-8 weeks after surgery), it causes a bump or pimple to form on the scar. This is easily removed and is not at all serious. It   does not mean the skin cancer has returned. Contact us if it happens, but do not be alarmed. 5. If you scar becomes tender, itchy or becomes very large, let Dr. Royal Perry know. There    are treatments that can improve the appearance of your scar or help make it more comfortable.

## 2022-03-23 NOTE — PROGRESS NOTES
S:  The patient is here for suture removal s/p Mohs surgery on the mid vertex scalp and Intermediate layered closure repair, 2 weeks ago. The site appears well-healed without signs of infection (redness, pain or discharge). The sutures were removed. Wound care and activity instructions given. The patient was scheduled for follow-up prn for scar/wound check. The patient was scheduled for f/u with General Dermatology per their instructions.

## 2022-06-03 ENCOUNTER — TELEPHONE (OUTPATIENT)
Dept: FAMILY MEDICINE CLINIC | Age: 87
End: 2022-06-03

## 2022-06-03 NOTE — TELEPHONE ENCOUNTER
Patient's wife, Howard Felder, is coming in to see Dr. Lila Savage at 1:30pm today for shortness of breath and cough. He is having sinus symptoms and is wondering if he can be seen with her. Please call patient to advise    #[phone]    Thank you!

## 2022-06-06 ENCOUNTER — OFFICE VISIT (OUTPATIENT)
Dept: ENT CLINIC | Age: 87
End: 2022-06-06
Payer: MEDICARE

## 2022-06-06 VITALS
BODY MASS INDEX: 25.11 KG/M2 | SYSTOLIC BLOOD PRESSURE: 138 MMHG | HEIGHT: 67 IN | WEIGHT: 160 LBS | HEART RATE: 75 BPM | DIASTOLIC BLOOD PRESSURE: 71 MMHG

## 2022-06-06 DIAGNOSIS — J01.90 ACUTE RHINOSINUSITIS: Primary | ICD-10-CM

## 2022-06-06 DIAGNOSIS — H61.22 IMPACTED CERUMEN OF LEFT EAR: ICD-10-CM

## 2022-06-06 PROCEDURE — 69210 REMOVE IMPACTED EAR WAX UNI: CPT | Performed by: OTOLARYNGOLOGY

## 2022-06-06 PROCEDURE — 99203 OFFICE O/P NEW LOW 30 MIN: CPT | Performed by: OTOLARYNGOLOGY

## 2022-06-06 PROCEDURE — 1123F ACP DISCUSS/DSCN MKR DOCD: CPT | Performed by: OTOLARYNGOLOGY

## 2022-06-06 RX ORDER — AMOXICILLIN AND CLAVULANATE POTASSIUM 875; 125 MG/1; MG/1
1 TABLET, FILM COATED ORAL 2 TIMES DAILY
Qty: 20 TABLET | Refills: 0 | Status: SHIPPED | OUTPATIENT
Start: 2022-06-06 | End: 2022-06-16

## 2022-06-06 RX ORDER — METHYLPREDNISOLONE 4 MG/1
TABLET ORAL
Qty: 1 KIT | Refills: 0 | Status: SHIPPED | OUTPATIENT
Start: 2022-06-06 | End: 2022-07-17
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
## 2022-07-17 PROBLEM — K59.04 CHRONIC IDIOPATHIC CONSTIPATION: Status: ACTIVE | Noted: 2022-07-17
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
## 2022-07-18 ENCOUNTER — OFFICE VISIT (OUTPATIENT)
Dept: FAMILY MEDICINE CLINIC | Age: 87
End: 2022-07-18
Payer: MEDICARE

## 2022-07-18 VITALS
RESPIRATION RATE: 14 BRPM | BODY MASS INDEX: 25.71 KG/M2 | DIASTOLIC BLOOD PRESSURE: 62 MMHG | HEART RATE: 76 BPM | WEIGHT: 160 LBS | TEMPERATURE: 97.9 F | HEIGHT: 66 IN | SYSTOLIC BLOOD PRESSURE: 128 MMHG | OXYGEN SATURATION: 98 %

## 2022-07-18 DIAGNOSIS — K59.04 CHRONIC IDIOPATHIC CONSTIPATION: ICD-10-CM

## 2022-07-18 DIAGNOSIS — E78.00 PURE HYPERCHOLESTEROLEMIA: ICD-10-CM

## 2022-07-18 DIAGNOSIS — N32.81 OVERACTIVE BLADDER: ICD-10-CM

## 2022-07-18 DIAGNOSIS — I10 ESSENTIAL HYPERTENSION, BENIGN: Primary | ICD-10-CM

## 2022-07-18 PROCEDURE — 99214 OFFICE O/P EST MOD 30 MIN: CPT | Performed by: FAMILY MEDICINE

## 2022-07-18 PROCEDURE — 1123F ACP DISCUSS/DSCN MKR DOCD: CPT | Performed by: FAMILY MEDICINE

## 2022-07-18 SDOH — ECONOMIC STABILITY: FOOD INSECURITY: WITHIN THE PAST 12 MONTHS, YOU WORRIED THAT YOUR FOOD WOULD RUN OUT BEFORE YOU GOT MONEY TO BUY MORE.: NEVER TRUE

## 2022-07-18 SDOH — ECONOMIC STABILITY: FOOD INSECURITY: WITHIN THE PAST 12 MONTHS, THE FOOD YOU BOUGHT JUST DIDN'T LAST AND YOU DIDN'T HAVE MONEY TO GET MORE.: NEVER TRUE

## 2022-07-18 ASSESSMENT — PATIENT HEALTH QUESTIONNAIRE - PHQ9
SUM OF ALL RESPONSES TO PHQ QUESTIONS 1-9: 0
1. LITTLE INTEREST OR PLEASURE IN DOING THINGS: 0
SUM OF ALL RESPONSES TO PHQ QUESTIONS 1-9: 0
2. FEELING DOWN, DEPRESSED OR HOPELESS: 0
SUM OF ALL RESPONSES TO PHQ9 QUESTIONS 1 & 2: 0
SUM OF ALL RESPONSES TO PHQ QUESTIONS 1-9: 0
SUM OF ALL RESPONSES TO PHQ QUESTIONS 1-9: 0

## 2022-07-18 ASSESSMENT — SOCIAL DETERMINANTS OF HEALTH (SDOH): HOW HARD IS IT FOR YOU TO PAY FOR THE VERY BASICS LIKE FOOD, HOUSING, MEDICAL CARE, AND HEATING?: NOT HARD AT ALL

## 2022-08-18 ENCOUNTER — TELEPHONE (OUTPATIENT)
Dept: FAMILY MEDICINE CLINIC | Age: 87
End: 2022-08-18

## 2022-08-18 DIAGNOSIS — U07.1 COVID-19: ICD-10-CM

## 2022-08-18 DIAGNOSIS — U07.1 COVID-19 VIRUS INFECTION: Primary | ICD-10-CM

## 2022-08-18 DIAGNOSIS — U07.1 COVID-19: Primary | ICD-10-CM

## 2022-08-18 RX ORDER — NIRMATRELVIR AND RITONAVIR 300-100 MG
KIT ORAL
Qty: 30 TABLET | Refills: 0 | Status: SHIPPED | OUTPATIENT
Start: 2022-08-18 | End: 2022-08-18

## 2022-08-18 NOTE — TELEPHONE ENCOUNTER
Patient called to report testing positive for COVID today and wanted to know what he needed to do; or medication he could take. Thanks.

## 2022-09-20 DIAGNOSIS — D64.9 ANEMIA, UNSPECIFIED TYPE: Primary | ICD-10-CM

## 2022-09-20 DIAGNOSIS — K59.04 CHRONIC IDIOPATHIC CONSTIPATION: ICD-10-CM

## 2022-09-20 DIAGNOSIS — I10 ESSENTIAL HYPERTENSION, BENIGN: ICD-10-CM

## 2022-09-20 DIAGNOSIS — E78.00 PURE HYPERCHOLESTEROLEMIA: ICD-10-CM

## 2022-09-20 LAB
A/G RATIO: 1.4 (ref 1.1–2.2)
ALBUMIN SERPL-MCNC: 3.7 G/DL (ref 3.4–5)
ALP BLD-CCNC: 59 U/L (ref 40–129)
ALT SERPL-CCNC: 7 U/L (ref 10–40)
ANION GAP SERPL CALCULATED.3IONS-SCNC: 8 MMOL/L (ref 3–16)
AST SERPL-CCNC: 15 U/L (ref 15–37)
BILIRUB SERPL-MCNC: 0.6 MG/DL (ref 0–1)
BUN BLDV-MCNC: 19 MG/DL (ref 7–20)
CALCIUM SERPL-MCNC: 9.6 MG/DL (ref 8.3–10.6)
CHLORIDE BLD-SCNC: 103 MMOL/L (ref 99–110)
CHOLESTEROL, TOTAL: 157 MG/DL (ref 0–199)
CO2: 28 MMOL/L (ref 21–32)
CREAT SERPL-MCNC: 1 MG/DL (ref 0.8–1.3)
GFR AFRICAN AMERICAN: >60
GFR NON-AFRICAN AMERICAN: >60
GLUCOSE BLD-MCNC: 85 MG/DL (ref 70–99)
HCT VFR BLD CALC: 39.2 % (ref 40.5–52.5)
HDLC SERPL-MCNC: 69 MG/DL (ref 40–60)
HEMOGLOBIN: 13.2 G/DL (ref 13.5–17.5)
LDL CHOLESTEROL CALCULATED: 74 MG/DL
MCH RBC QN AUTO: 32.4 PG (ref 26–34)
MCHC RBC AUTO-ENTMCNC: 33.7 G/DL (ref 31–36)
MCV RBC AUTO: 96.1 FL (ref 80–100)
PDW BLD-RTO: 14.7 % (ref 12.4–15.4)
PLATELET # BLD: 176 K/UL (ref 135–450)
PMV BLD AUTO: 9.5 FL (ref 5–10.5)
POTASSIUM SERPL-SCNC: 4.4 MMOL/L (ref 3.5–5.1)
RBC # BLD: 4.08 M/UL (ref 4.2–5.9)
SODIUM BLD-SCNC: 139 MMOL/L (ref 136–145)
TOTAL PROTEIN: 6.4 G/DL (ref 6.4–8.2)
TRIGL SERPL-MCNC: 70 MG/DL (ref 0–150)
TSH REFLEX: 2.79 UIU/ML (ref 0.27–4.2)
VLDLC SERPL CALC-MCNC: 14 MG/DL
WBC # BLD: 4.2 K/UL (ref 4–11)

## 2022-09-21 DIAGNOSIS — D64.9 ANEMIA, UNSPECIFIED TYPE: ICD-10-CM

## 2022-09-21 LAB
FERRITIN: 135.8 NG/ML (ref 30–400)
IRON SATURATION: 38 % (ref 20–50)
IRON: 98 UG/DL (ref 59–158)
TOTAL IRON BINDING CAPACITY: 261 UG/DL (ref 260–445)

## 2022-10-19 ENCOUNTER — OFFICE VISIT (OUTPATIENT)
Dept: DERMATOLOGY | Age: 87
End: 2022-10-19
Payer: MEDICARE

## 2022-10-19 DIAGNOSIS — Z85.828 HISTORY OF BASAL CELL CARCINOMA: ICD-10-CM

## 2022-10-19 DIAGNOSIS — L57.0 ACTINIC KERATOSIS: Primary | ICD-10-CM

## 2022-10-19 DIAGNOSIS — L82.1 SK (SEBORRHEIC KERATOSIS): ICD-10-CM

## 2022-10-19 PROCEDURE — 1123F ACP DISCUSS/DSCN MKR DOCD: CPT | Performed by: DERMATOLOGY

## 2022-10-19 PROCEDURE — 17003 DESTRUCT PREMALG LES 2-14: CPT | Performed by: DERMATOLOGY

## 2022-10-19 PROCEDURE — 99212 OFFICE O/P EST SF 10 MIN: CPT | Performed by: DERMATOLOGY

## 2022-10-19 PROCEDURE — 17000 DESTRUCT PREMALG LESION: CPT | Performed by: DERMATOLOGY
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
## 2022-12-02 SDOH — HEALTH STABILITY: PHYSICAL HEALTH: ON AVERAGE, HOW MANY DAYS PER WEEK DO YOU ENGAGE IN MODERATE TO STRENUOUS EXERCISE (LIKE A BRISK WALK)?: 3 DAYS

## 2022-12-02 SDOH — HEALTH STABILITY: PHYSICAL HEALTH: ON AVERAGE, HOW MANY MINUTES DO YOU ENGAGE IN EXERCISE AT THIS LEVEL?: 30 MIN

## 2022-12-02 ASSESSMENT — LIFESTYLE VARIABLES
HOW OFTEN DO YOU HAVE SIX OR MORE DRINKS ON ONE OCCASION: 1
HOW MANY STANDARD DRINKS CONTAINING ALCOHOL DO YOU HAVE ON A TYPICAL DAY: 1 OR 2
HOW MANY STANDARD DRINKS CONTAINING ALCOHOL DO YOU HAVE ON A TYPICAL DAY: 1
HOW OFTEN DO YOU HAVE A DRINK CONTAINING ALCOHOL: MONTHLY OR LESS
HOW OFTEN DO YOU HAVE A DRINK CONTAINING ALCOHOL: 2

## 2022-12-02 ASSESSMENT — PATIENT HEALTH QUESTIONNAIRE - PHQ9
SUM OF ALL RESPONSES TO PHQ QUESTIONS 1-9: 0
SUM OF ALL RESPONSES TO PHQ9 QUESTIONS 1 & 2: 0
SUM OF ALL RESPONSES TO PHQ QUESTIONS 1-9: 0
1. LITTLE INTEREST OR PLEASURE IN DOING THINGS: 0
2. FEELING DOWN, DEPRESSED OR HOPELESS: 0
SUM OF ALL RESPONSES TO PHQ QUESTIONS 1-9: 0
SUM OF ALL RESPONSES TO PHQ QUESTIONS 1-9: 0

## 2022-12-09 ENCOUNTER — OFFICE VISIT (OUTPATIENT)
Dept: FAMILY MEDICINE CLINIC | Age: 87
End: 2022-12-09
Payer: MEDICARE

## 2022-12-09 VITALS
HEART RATE: 50 BPM | BODY MASS INDEX: 25.97 KG/M2 | RESPIRATION RATE: 14 BRPM | WEIGHT: 161.6 LBS | SYSTOLIC BLOOD PRESSURE: 128 MMHG | DIASTOLIC BLOOD PRESSURE: 62 MMHG | HEIGHT: 66 IN | TEMPERATURE: 97.8 F | OXYGEN SATURATION: 96 %

## 2022-12-09 DIAGNOSIS — Z00.00 MEDICARE ANNUAL WELLNESS VISIT, SUBSEQUENT: Primary | ICD-10-CM

## 2022-12-09 DIAGNOSIS — R63.4 UNINTENTIONAL WEIGHT LOSS: ICD-10-CM

## 2022-12-09 DIAGNOSIS — E78.00 PURE HYPERCHOLESTEROLEMIA: ICD-10-CM

## 2022-12-09 DIAGNOSIS — I10 ESSENTIAL HYPERTENSION, BENIGN: ICD-10-CM

## 2022-12-09 DIAGNOSIS — Z71.89 ACP (ADVANCE CARE PLANNING): ICD-10-CM

## 2022-12-09 PROCEDURE — G0439 PPPS, SUBSEQ VISIT: HCPCS | Performed by: FAMILY MEDICINE

## 2022-12-09 PROCEDURE — 1123F ACP DISCUSS/DSCN MKR DOCD: CPT | Performed by: FAMILY MEDICINE

## 2022-12-09 RX ORDER — MIRABEGRON 50 MG/1
TABLET, FILM COATED, EXTENDED RELEASE ORAL
Qty: 90 TABLET | Refills: 3 | Status: SHIPPED | OUTPATIENT
Start: 2022-12-09

## 2022-12-09 RX ORDER — LISINOPRIL AND HYDROCHLOROTHIAZIDE 12.5; 1 MG/1; MG/1
1 TABLET ORAL DAILY
Qty: 90 TABLET | Refills: 3 | Status: SHIPPED | OUTPATIENT
Start: 2022-12-09

## 2022-12-09 NOTE — PATIENT INSTRUCTIONS
Learning About Activities of Daily Living  What are activities of daily living? Activities of daily living (ADLs) are the basic self-care tasks you do every day. As you age, and if you have health problems, you may find that it's harder to do these things for yourself. That's when you may need some help. Your doctor uses ADLs to measure how much help you need. Knowing what you can and can't do for yourself is an important first step to getting help. And when you have the help you need, you can stay as independent as possible. Your doctor will want to know if you are able to do tasks such as: Take a bath or shower without help. Go to the bathroom by yourself. Dress and undress without help. Shave, comb your hair, and brush teeth on your own. Get in and out of bed or a chair without help. Feed yourself without help. If you are having trouble doing basic self-care tasks, talk with your doctor. You may want to bring a caregiver or family member who can help the doctor understand your needs and abilities. How will a doctor assess your ADLs? Asking about ADLs is part of a routine health checkup your doctor will likely do as you age. Your health check might be done in a doctor's office, in your home, or at a hospital. The goal is to find out if you are having any problems that could make your health problems worse or that make it unsafe for you to be on your own. To measure your ADLs, your doctor will ask how hard it is for you to do routine tasks. He or she may also want to know if you have changed the way you do a task because of a health problem. He or she may watch how you:  Walk back and forth. Keep your balance while you stand or walk. Move from sitting to standing or from a bed to a chair. Button or unbutton a shirt or sweater. Remove and put on your shoes. It's normal to feel a little worried or anxious if you find you can't do all the things you used to be able to do.  Talking with your doctor about ADLs isn't a test that you either pass or fail. It's just a way to get more information about your health and safety. Follow-up care is a key part of your treatment and safety. Be sure to make and go to all appointments, and call your doctor if you are having problems. It's also a good idea to know your test results and keep a list of the medicines you take. Current as of: October 6, 2021               Content Version: 13.5  © 2006-2022 Healthwise, [employer]. Care instructions adapted under license by [facility] ([facility]). If you have questions about a medical condition or this instruction, always ask your healthcare professional. [address] any warranty or liability for your use of this information. Advance Directives: Care Instructions  Overview  An advance directive is a legal way to state your wishes at the end of your life. It tells your family and your doctor what to do if you can't say what you want. There are two main types of advance directives. You can change them any time your wishes change. Living will. This form tells your family and your doctor your wishes about life support and other treatment. The form is also called a declaration. Medical power of . This form lets you name a person to make treatment decisions for you when you can't speak for yourself. This person is called a health care agent (health care proxy, health care surrogate). The form is also called a durable power of  for health care. If you do not have an advance directive, decisions about your medical care may be made by a family member, or by a doctor or a  who doesn't know you. It may help to think of an advance directive as a gift to the people who care for you. If you have one, they won't have to make tough decisions by themselves.   For more information, including forms for your state, see the [address] website and screenings are paid in full while other may be subject to a deductible, co-insurance, and/or copay. Some of these benefits include a comprehensive review of your medical history including lifestyle, illnesses that may run in your family, and various assessments and screenings as appropriate. After reviewing your medical record and screening and assessments performed today your provider may have ordered immunizations, labs, imaging, and/or referrals for you. A list of these orders (if applicable) as well as your Preventive Care list are included within your After Visit Summary for your review. Other Preventive Recommendations:    A preventive eye exam performed by an eye specialist is recommended every 1-2 years to screen for glaucoma; cataracts, macular degeneration, and other eye disorders. A preventive dental visit is recommended every 6 months. Try to get at least 150 minutes of exercise per week or 10,000 steps per day on a pedometer . Order or download the FREE \"Exercise & Physical Activity: Your Everyday Guide\" from [employer]. Call 9-910.228.3609 or search [employer] online. You need 0419-9628 mg of calcium and 0470-7904 IU of vitamin D per day. It is possible to meet your calcium requirement with diet alone, but a vitamin D supplement is usually necessary to meet this goal.  When exposed to the sun, use a sunscreen that protects against both UVA and UVB radiation with an SPF of 30 or greater. Reapply every 2 to 3 hours or after sweating, drying off with a towel, or swimming. Always wear a seat belt when traveling in a car. Always wear a helmet when riding a bicycle or motorcycle.

## 2022-12-09 NOTE — PROGRESS NOTES
Medicare Annual Wellness Visit    Stephen Solis is here for Medicare AWV    Assessment & Plan   Medicare annual wellness visit, subsequent  Essential hypertension, benign  The following orders have not been finalized:  -     lisinopril-hydroCHLOROthiazide (PRINZIDE;ZESTORETIC) 10-12.5 MG per tablet  At goal < 140/90  Pure hypercholesterolemia  LDL at goal < 130. Tolerating statin  ACP (advance care planning)  On file, no changes. Unintentional weight loss  Occurred last year; now stable. Recommendations for Preventive Services Due: see orders and patient instructions/AVS.  Recommended screening schedule for the next 5-10 years is provided to the patient in written form: see Patient Instructions/AVS.     No follow-ups on file. Subjective   The following acute and/or chronic problems were also addressed today:      Hyperlipidemia:  No new myalgias or GI upset on simvastatin (Zocor). Medication compliance: compliant most of the time. Patient is  following a low fat, low cholesterol diet. He is  exercising regularly. Lab Results   Component Value Date    CHOL 157 09/20/2022    TRIG 70 09/20/2022    HDL 69 (H) 09/20/2022    LDLCALC 74 09/20/2022     Lab Results   Component Value Date    ALT 7 (L) 09/20/2022    AST 15 09/20/2022           Hypertension: Patient here for follow-up of elevated blood pressure. He is exercising and is adherent to low salt diet. Blood pressure is well controlled at home. Cardiac symptoms none. Patient denies chest pressure/discomfort, dyspnea, exertional chest pressure/discomfort, lower extremity edema, near-syncope, and palpitations. Cardiovascular risk factors: advanced age (older than 54 for men, 72 for women), dyslipidemia, hypertension, and male gender. Use of agents associated with hypertension: none. History of target organ damage: none. Patient's complete Health Risk Assessment and screening values have been reviewed and are found in Flowsheets.  The following problems were reviewed today and where indicated follow up appointments were made and/or referrals ordered. Positive Risk Factor Screenings with Interventions:                      ADL's:   Patient reports needing help with:  Select all that apply: (!) Banking/Finances    Interventions:  Patient comments: he has never done banking and finances  See AVS for additional education material  See A/P for plan and any pertinent orders      CV Risk Counseling:  Patient was asked about his current diet and exercise habits, and personalized advice was provided regarding recommended lifestyle changes. Patient's individual cardiovascular disease risk factors, including advanced age (> 54 for men, > 72 for women), dyslipidemia, hypertension, and male gender, were discussed, as well as the likely benefits of lifestyle changes. Based upon patient's motivation to change his behavior, the following plan was agreed upon to work toward lowering cardiovascular disease risk: low saturated fat, low cholesterol diet. Aspirin use for primary prevention of cardiovascular disease for men 45-79 and women 55-79: Not indicated. Educational materials for lifestyle changes were provided. Patient will follow-up in 12 month(s) with PCP. Provider spent 5 minutes counseling patient. Objective   Vitals:    12/09/22 0952 12/09/22 1050   BP: 138/66 128/62   Pulse: 50    Resp: 14    Temp: 97.8 °F (36.6 °C)    TempSrc: Temporal    SpO2: 96%    Weight: 161 lb 9.6 oz (73.3 kg)    Height: 5' 6\" (1.676 m)       Body mass index is 26.08 kg/m².    Wt Readings from Last 3 Encounters:   12/09/22 161 lb 9.6 oz (73.3 kg)   07/18/22 160 lb (72.6 kg)   06/06/22 160 lb (72.6 kg)         Physical Exam  General Appearance: alert and oriented to person, place and time, well developed and well- nourished, in no acute distress  Skin: warm and dry, no rash or erythema  Head: normocephalic and atraumatic  Eyes: pupils equal, round, and reactive to light, extraocular eye movements intact, conjunctivae normal  ENT: tympanic membrane, external ear and ear canal normal bilaterally, nose without deformity, nasal mucosa and turbinates normal without polyps  Neck: supple and non-tender without mass, no thyromegaly or thyroid nodules, no cervical lymphadenopathy  Pulmonary/Chest: clear to auscultation bilaterally- no wheezes, rales or rhonchi, normal air movement, no respiratory distress  Cardiovascular: normal rate, regular rhythm, normal S1 and S2, no murmurs, rubs, clicks, or gallops, distal pulses intact, no carotid bruits  Abdomen: soft, non-tender, non-distended, normal bowel sounds, no masses or organomegaly  Extremities: no cyanosis, clubbing or edema  Musculoskeletal: normal range of motion, no joint swelling, deformity or tenderness  Neurologic: reflexes normal and symmetric, no cranial nerve deficit, gait, coordination and speech normal       Allergies   Allergen Reactions    Percodan [Oxycodone-Aspirin] Nausea Only     Prior to Visit Medications    Medication Sig Taking?  Authorizing Provider   MYRBETRIQ 50 MG TB24 TAKE 1 TABLET BY MOUTH EVERY DAY Yes Historical Provider, MD   simvastatin (ZOCOR) 20 MG tablet TAKE 1 TABLET NIGHTLY Yes Wilhemenia Hodgkins, MD   lisinopril-hydroCHLOROthiazide (PRINZIDE;ZESTORETIC) 10-12.5 MG per tablet Take 1 tablet by mouth daily Yes Wilhemenia Hodgkins, MD   linaclotide [facility]) 145 MCG capsule Take 1 capsule by mouth every morning (before breakfast) Yes Wilhemenia Hodgkins, MD   Multiple Vitamins-Minerals (CENTRUM SILVER 50+MEN PO) Take 1 tablet by mouth Yes Historical Provider, MD   vitamin B-12 (CYANOCOBALAMIN) 1000 MCG tablet Take 1,000 mcg by mouth daily Yes Historical Provider, MD   BIOTIN 5000 PO Take by mouth Yes Historical Provider, MD       CareTeam (Including outside providers/suppliers regularly involved in providing care):   Patient Care Team:  Wilhemenia Hodgkins, MD as PCP - General (Family Medicine)  Wilhemenia Hodgkins, MD as PCP - [facility] Empaneled Provider  Lauren Martinez MD as Consulting Physician (Otolaryngology)     Reviewed and updated this visit:  Tobacco  Allergies  Meds  Problems  Med Hx  Surg Hx  Soc Hx  Fam Hx

## 2023-01-18 ENCOUNTER — TELEMEDICINE (OUTPATIENT)
Dept: FAMILY MEDICINE CLINIC | Age: 88
End: 2023-01-18
Payer: MEDICARE

## 2023-01-18 DIAGNOSIS — R25.8 NOCTURNAL LEG MOVEMENTS: Primary | ICD-10-CM

## 2023-01-18 PROCEDURE — 1123F ACP DISCUSS/DSCN MKR DOCD: CPT | Performed by: FAMILY MEDICINE

## 2023-01-18 PROCEDURE — 99213 OFFICE O/P EST LOW 20 MIN: CPT | Performed by: FAMILY MEDICINE

## 2023-01-18 RX ORDER — SIMVASTATIN 20 MG
TABLET ORAL
Qty: 90 TABLET | Refills: 3 | Status: CANCELLED | OUTPATIENT
Start: 2023-01-18

## 2023-01-18 RX ORDER — GABAPENTIN 100 MG/1
100-200 CAPSULE ORAL NIGHTLY
Qty: 60 CAPSULE | Refills: 2 | Status: SHIPPED | OUTPATIENT
Start: 2023-01-18 | End: 2023-04-18

## 2023-01-18 RX ORDER — MIRABEGRON 50 MG/1
TABLET, FILM COATED, EXTENDED RELEASE ORAL
Qty: 90 TABLET | Refills: 3 | Status: CANCELLED | OUTPATIENT
Start: 2023-01-18

## 2023-01-18 ASSESSMENT — PATIENT HEALTH QUESTIONNAIRE - PHQ9
SUM OF ALL RESPONSES TO PHQ QUESTIONS 1-9: 0
SUM OF ALL RESPONSES TO PHQ QUESTIONS 1-9: 0
1. LITTLE INTEREST OR PLEASURE IN DOING THINGS: 0
SUM OF ALL RESPONSES TO PHQ QUESTIONS 1-9: 0
SUM OF ALL RESPONSES TO PHQ QUESTIONS 1-9: 0
2. FEELING DOWN, DEPRESSED OR HOPELESS: 0
SUM OF ALL RESPONSES TO PHQ9 QUESTIONS 1 & 2: 0

## 2023-01-18 NOTE — PROGRESS NOTES
2023    TELEHEALTH EVALUATION -- Audio/Visual (During COVID-19 public health emergency)    HPI:    Tommy Amato (:  1935) has requested an audio/video evaluation for the following concern(s):    Sleep issue.       Issue with falling out of bed x 3 over the past 8 months.  He set up a bed rail and that has helped.  No rere injury - had bruise on the temple.  His wife notes he is very active in bed, tossing, flailing limbs.  He often has dreams that he is chasing someone or being chased.  He has not trouble falling to sleep.  He does wake up to go to the BR about 3 - 4 times a night.  No new medications or supplements that coincide with sxs. Denies headache, tremor, stiff joint, change in hand writing.     Review of Systems    Prior to Visit Medications    Medication Sig Taking? Authorizing Provider   lisinopril-hydroCHLOROthiazide (PRINZIDE;ZESTORETIC) 10-12.5 MG per tablet Take 1 tablet by mouth daily Yes Columba Oviedo MD   MYRBETRIQ 50 MG TB24 TAKE 1 TABLET BY MOUTH EVERY DAY Yes Columba Oviedo MD   simvastatin (ZOCOR) 20 MG tablet TAKE 1 TABLET NIGHTLY Yes Columba Oviedo MD   linaclotide (LINZESS) 145 MCG capsule Take 1 capsule by mouth every morning (before breakfast) Yes Columba Oviedo MD   Multiple Vitamins-Minerals (CENTRUM SILVER 50+MEN PO) Take 1 tablet by mouth Yes Historical Provider, MD   vitamin B-12 (CYANOCOBALAMIN) 1000 MCG tablet Take 1,000 mcg by mouth daily Yes Historical Provider, MD   BIOTIN 5000 PO Take by mouth Yes Historical Provider, MD       Social History     Tobacco Use    Smoking status: Former     Types: Pipe     Quit date: 1965     Years since quittin.0    Smokeless tobacco: Never   Substance Use Topics    Alcohol use: Yes     Alcohol/week: 3.0 standard drinks     Types: 3 Glasses of wine, 4 Standard drinks or equivalent per week     Comment: in a week    Drug use: No            PHYSICAL EXAMINATION:  [ INSTRUCTIONS:  \"[x]\" Indicates a positive  item  \"[]\" Indicates a negative item  -- DELETE ALL ITEMS NOT EXAMINED]  Vital Signs: (As obtained by patient/caregiver or practitioner observation)    Blood pressure-  Heart rate-    Respiratory rate-    Temperature-  Pulse oximetry-   Wt Readings from Last 3 Encounters:   12/09/22 161 lb 9.6 oz (73.3 kg)   07/18/22 160 lb (72.6 kg)   06/06/22 160 lb (72.6 kg)     Temp Readings from Last 3 Encounters:   12/09/22 97.8 °F (36.6 °C) (Temporal)   07/18/22 97.9 °F (36.6 °C) (Temporal)   03/09/22 97.8 °F (36.6 °C)     BP Readings from Last 3 Encounters:   12/09/22 128/62   07/18/22 128/62   06/06/22 138/71     Pulse Readings from Last 3 Encounters:   12/09/22 50   07/18/22 76   06/06/22 75      Constitutional: [x] Appears well-developed and well-nourished [x] No apparent distress      [] Abnormal-   Mental status  [x] Alert and awake  [x] Oriented to person/place/time [x]Able to follow commands      Eyes:  EOM    [x]  Normal  [] Abnormal-  Sclera  [x]  Normal  [] Abnormal -         Discharge [x]  None visible  [] Abnormal -      Neck: [x] No visualized mass     Pulmonary/Chest: [x] Respiratory effort normal.  [x] No visualized signs of difficulty breathing or respiratory distress        [] Abnormal-          Neurological:        [x] No Facial Asymmetry (Cranial nerve 7 motor function) (limited exam to video visit)             Skin:        [x] No significant exanthematous lesions or discoloration noted on facial skin         [] Abnormal-            Psychiatric:       [x] Normal Affect [x] No Hallucinations        [] Abnormal-     Other pertinent observable physical exam findings-     ASSESSMENT/PLAN:  1. Nocturnal leg movements  No s/s of Parkinson's or other neurologic disorder. Trial Neurontin. Refer to Dr. Sanjiv Whatley  Side effects of current medications reviewed and questions answered. Follow up in 4 weeks or prn. - Jose Maldonado MD, Sleep Medicine, [facility]  - gabapentin (NEURONTIN) 100 MG capsule;  Take 1-2 capsules by mouth at bedtime for 90 days. Intended supply: 90 days  Dispense: 60 capsule; Refill: 2      No follow-ups on file. Coolidge N[place], was evaluated through a synchronous (real-time) audio-video encounter. The patient (or guardian if applicable) is aware that this is a billable service, which includes applicable co-pays. This Virtual Visit was conducted with patient's (and/or legal guardian's) consent. The visit was conducted pursuant to the emergency declaration under the [facility], [facility] authority and the [employer] General Act. Patient identification was verified, and a caregiver was present when appropriate. The patient was located at Home: [address] [facility] [address] [place] [address]. Provider was located at [facility] (Appt Dept): [address] [place] [address],  [address] [place] [address]. Total time spent on this encounter: Not billed by time    --Allyson Lainez MD on 1/18/2023 at 3:46 PM    An electronic signature was used to authenticate this note.

## 2023-01-23 DIAGNOSIS — E78.00 PURE HYPERCHOLESTEROLEMIA: ICD-10-CM

## 2023-01-23 RX ORDER — SIMVASTATIN 20 MG
TABLET ORAL
Qty: 90 TABLET | Refills: 2 | Status: SHIPPED | OUTPATIENT
Start: 2023-01-23

## 2023-01-23 NOTE — TELEPHONE ENCOUNTER
Requested Prescriptions     Pending Prescriptions Disp Refills    simvastatin (ZOCOR) 20 MG tablet [Pharmacy Med Name: SIMVASTATIN TABS 20MG] 90 tablet 3     Sig: TAKE 1 TABLET NIGHTLY     Last OV-1/18/2023  Labs- 9/20/22  NFOV

## 2023-01-27 NOTE — TELEPHONE ENCOUNTER
Patient informed by phone and voiced understanding   Requested Prescriptions     Pending Prescriptions Disp Refills    hydroCHLOROthiazide (MICROZIDE) 12.5 MG capsule [Pharmacy Med Name: HYDROCHLOROTHIAZIDE 12.5MG CAPSULES] 90 capsule      Sig: TAKE 1 CAPSULE BY MOUTH EVERY MORNING     12/7/2021  Last ov 12/2/2021

## 2023-02-14 NOTE — TELEPHONE ENCOUNTER
Requested Prescriptions     Pending Prescriptions Disp Refills    mirabegron (MYRBETRIQ) 50 MG TB24 [Pharmacy Med Name: MYRBETRIQ TABS 50MG] 90 tablet 3     Sig: TAKE 1 TABLET DAILY     Last ov 1/18/2023   Last labs 9/20/22

## 2023-02-15 NOTE — TELEPHONE ENCOUNTER
Requested Prescriptions     Pending Prescriptions Disp Refills    mirabegron (MYRBETRIQ) 50 MG TB24 90 tablet 3     Sig: TAKE 1 TABLET DAILY     Last ov 5/45/3421  Duplicate. YOUR PATIENT HAS REQUESTED A REFILL OF THIS MEDICATION, PREVIOUSLY AUTHORIZED BY ANOTHER PRESCRIBER.

## 2023-03-01 ENCOUNTER — OFFICE VISIT (OUTPATIENT)
Dept: DERMATOLOGY | Age: 88
End: 2023-03-01
Payer: MEDICARE

## 2023-03-01 DIAGNOSIS — L81.4 SOLAR LENTIGO: ICD-10-CM

## 2023-03-01 DIAGNOSIS — L82.1 SK (SEBORRHEIC KERATOSIS): ICD-10-CM

## 2023-03-01 DIAGNOSIS — Z85.828 HISTORY OF BASAL CELL CARCINOMA: ICD-10-CM

## 2023-03-01 DIAGNOSIS — D69.2 SOLAR PURPURA (HCC): Primary | ICD-10-CM

## 2023-03-01 DIAGNOSIS — D48.5 NEOPLASM OF UNCERTAIN BEHAVIOR OF SKIN: ICD-10-CM

## 2023-03-01 DIAGNOSIS — L57.0 ACTINIC KERATOSIS: ICD-10-CM

## 2023-03-01 PROCEDURE — 1123F ACP DISCUSS/DSCN MKR DOCD: CPT | Performed by: DERMATOLOGY

## 2023-03-01 PROCEDURE — 11102 TANGNTL BX SKIN SINGLE LES: CPT | Performed by: DERMATOLOGY

## 2023-03-01 PROCEDURE — 17000 DESTRUCT PREMALG LESION: CPT | Performed by: DERMATOLOGY

## 2023-03-01 PROCEDURE — 17003 DESTRUCT PREMALG LES 2-14: CPT | Performed by: DERMATOLOGY

## 2023-03-01 PROCEDURE — 99213 OFFICE O/P EST LOW 20 MIN: CPT | Performed by: DERMATOLOGY
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
## 2023-03-10 ENCOUNTER — PROCEDURE VISIT (OUTPATIENT)
Dept: DERMATOLOGY | Age: 88
End: 2023-03-10

## 2023-03-10 DIAGNOSIS — C44.622 SQUAMOUS CELL CANCER OF SKIN OF RIGHT FOREARM: Primary | ICD-10-CM

## 2023-04-26 DIAGNOSIS — R25.8 NOCTURNAL LEG MOVEMENTS: ICD-10-CM

## 2023-06-19 DIAGNOSIS — Z86.2 HISTORY OF ANEMIA: ICD-10-CM

## 2023-06-19 DIAGNOSIS — I10 ESSENTIAL HYPERTENSION, BENIGN: ICD-10-CM

## 2023-06-19 DIAGNOSIS — E78.00 PURE HYPERCHOLESTEROLEMIA: ICD-10-CM

## 2023-06-20 LAB
ALBUMIN SERPL-MCNC: 4.1 G/DL (ref 3.4–5)
ALBUMIN/GLOB SERPL: 1.7 {RATIO} (ref 1.1–2.2)
ALP SERPL-CCNC: 70 U/L (ref 40–129)
ALT SERPL-CCNC: 10 U/L (ref 10–40)
ANION GAP SERPL CALCULATED.3IONS-SCNC: 11 MMOL/L (ref 3–16)
AST SERPL-CCNC: 17 U/L (ref 15–37)
BILIRUB SERPL-MCNC: 0.3 MG/DL (ref 0–1)
BUN SERPL-MCNC: 27 MG/DL (ref 7–20)
CALCIUM SERPL-MCNC: 9.5 MG/DL (ref 8.3–10.6)
CHLORIDE SERPL-SCNC: 106 MMOL/L (ref 99–110)
CHOLEST SERPL-MCNC: 162 MG/DL (ref 0–199)
CO2 SERPL-SCNC: 25 MMOL/L (ref 21–32)
CREAT SERPL-MCNC: 1 MG/DL (ref 0.8–1.3)
DEPRECATED RDW RBC AUTO: 14.9 % (ref 12.4–15.4)
GFR SERPLBLD CREATININE-BSD FMLA CKD-EPI: >60 ML/MIN/{1.73_M2}
GLUCOSE SERPL-MCNC: 104 MG/DL (ref 70–99)
HCT VFR BLD AUTO: 39.5 % (ref 40.5–52.5)
HDLC SERPL-MCNC: 72 MG/DL (ref 40–60)
HGB BLD-MCNC: 13.2 G/DL (ref 13.5–17.5)
LDLC SERPL CALC-MCNC: 79 MG/DL
MCH RBC QN AUTO: 32.6 PG (ref 26–34)
MCHC RBC AUTO-ENTMCNC: 33.6 G/DL (ref 31–36)
MCV RBC AUTO: 97.1 FL (ref 80–100)
PLATELET # BLD AUTO: 194 K/UL (ref 135–450)
PMV BLD AUTO: 10.1 FL (ref 5–10.5)
POTASSIUM SERPL-SCNC: 4.3 MMOL/L (ref 3.5–5.1)
PROT SERPL-MCNC: 6.5 G/DL (ref 6.4–8.2)
RBC # BLD AUTO: 4.06 M/UL (ref 4.2–5.9)
SODIUM SERPL-SCNC: 142 MMOL/L (ref 136–145)
TRIGL SERPL-MCNC: 53 MG/DL (ref 0–150)
TSH SERPL DL<=0.005 MIU/L-ACNC: 2.09 UIU/ML (ref 0.27–4.2)
VLDLC SERPL CALC-MCNC: 11 MG/DL
WBC # BLD AUTO: 4.6 K/UL (ref 4–11)

## 2023-06-21 ENCOUNTER — OFFICE VISIT (OUTPATIENT)
Age: 88
End: 2023-06-21
Payer: MEDICARE

## 2023-06-21 ENCOUNTER — TELEPHONE (OUTPATIENT)
Age: 88
End: 2023-06-21

## 2023-06-21 VITALS
WEIGHT: 163.13 LBS | HEART RATE: 74 BPM | SYSTOLIC BLOOD PRESSURE: 128 MMHG | BODY MASS INDEX: 26.33 KG/M2 | TEMPERATURE: 97.9 F | DIASTOLIC BLOOD PRESSURE: 62 MMHG | OXYGEN SATURATION: 99 % | RESPIRATION RATE: 16 BRPM

## 2023-06-21 DIAGNOSIS — M17.11 PRIMARY OSTEOARTHRITIS OF RIGHT KNEE: ICD-10-CM

## 2023-06-21 DIAGNOSIS — E78.00 PURE HYPERCHOLESTEROLEMIA: ICD-10-CM

## 2023-06-21 DIAGNOSIS — I10 ESSENTIAL HYPERTENSION, BENIGN: Primary | ICD-10-CM

## 2023-06-21 DIAGNOSIS — R73.01 ELEVATED FASTING GLUCOSE: ICD-10-CM

## 2023-06-21 DIAGNOSIS — R73.01 ELEVATED FASTING GLUCOSE: Primary | ICD-10-CM

## 2023-06-21 DIAGNOSIS — K59.04 CHRONIC IDIOPATHIC CONSTIPATION: ICD-10-CM

## 2023-06-21 PROBLEM — F33.0 MAJOR DEPRESSIVE DISORDER, RECURRENT, MILD (HCC): Status: RESOLVED | Noted: 2023-06-21 | Resolved: 2023-06-21

## 2023-06-21 PROBLEM — F33.0 MAJOR DEPRESSIVE DISORDER, RECURRENT, MILD (HCC): Status: ACTIVE | Noted: 2023-06-21

## 2023-06-21 PROCEDURE — 99214 OFFICE O/P EST MOD 30 MIN: CPT | Performed by: FAMILY MEDICINE

## 2023-06-21 PROCEDURE — 1123F ACP DISCUSS/DSCN MKR DOCD: CPT | Performed by: FAMILY MEDICINE

## 2023-06-21 RX ORDER — TRAZODONE HYDROCHLORIDE 50 MG/1
25 TABLET ORAL NIGHTLY
Qty: 90 TABLET | Refills: 3 | Status: SHIPPED | OUTPATIENT
Start: 2023-06-21

## 2023-06-21 SDOH — ECONOMIC STABILITY: INCOME INSECURITY: HOW HARD IS IT FOR YOU TO PAY FOR THE VERY BASICS LIKE FOOD, HOUSING, MEDICAL CARE, AND HEATING?: NOT HARD AT ALL

## 2023-06-21 SDOH — ECONOMIC STABILITY: FOOD INSECURITY: WITHIN THE PAST 12 MONTHS, THE FOOD YOU BOUGHT JUST DIDN'T LAST AND YOU DIDN'T HAVE MONEY TO GET MORE.: NEVER TRUE

## 2023-06-21 SDOH — ECONOMIC STABILITY: FOOD INSECURITY: WITHIN THE PAST 12 MONTHS, YOU WORRIED THAT YOUR FOOD WOULD RUN OUT BEFORE YOU GOT MONEY TO BUY MORE.: NEVER TRUE

## 2023-06-21 SDOH — ECONOMIC STABILITY: HOUSING INSECURITY
IN THE LAST 12 MONTHS, WAS THERE A TIME WHEN YOU DID NOT HAVE A STEADY PLACE TO SLEEP OR SLEPT IN A SHELTER (INCLUDING NOW)?: NO
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
## 2023-06-22 PROBLEM — R73.02 IMPAIRED GLUCOSE TOLERANCE: Status: ACTIVE | Noted: 2023-06-22

## 2023-06-22 LAB
EST. AVERAGE GLUCOSE BLD GHB EST-MCNC: 119.8 MG/DL
HBA1C MFR BLD: 5.8 %

## 2023-08-25 ENCOUNTER — OFFICE VISIT (OUTPATIENT)
Dept: VASCULAR SURGERY | Age: 88
End: 2023-08-25

## 2023-08-25 VITALS
BODY MASS INDEX: 23.49 KG/M2 | HEART RATE: 70 BPM | DIASTOLIC BLOOD PRESSURE: 62 MMHG | SYSTOLIC BLOOD PRESSURE: 131 MMHG | HEIGHT: 68 IN | WEIGHT: 155 LBS

## 2023-08-25 DIAGNOSIS — I82.811 ACUTE SUPERFICIAL VENOUS THROMBOSIS OF LOWER EXTREMITY, RIGHT: Primary | ICD-10-CM

## 2023-08-25 RX ORDER — NAPROXEN 500 MG/1
500 TABLET ORAL 2 TIMES DAILY WITH MEALS
Qty: 30 TABLET | Refills: 0 | Status: SHIPPED | OUTPATIENT
Start: 2023-08-25

## 2023-08-25 RX ORDER — CLOBETASOL PROPIONATE 0.5 MG/G
CREAM TOPICAL
COMMUNITY
Start: 2023-07-09

## 2023-08-25 ASSESSMENT — ENCOUNTER SYMPTOMS
ALLERGIC/IMMUNOLOGIC NEGATIVE: 1
SINUS PAIN: 1
CONSTIPATION: 1
SINUS PRESSURE: 1
RESPIRATORY NEGATIVE: 1
EYES NEGATIVE: 1
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
## 2023-08-29 ENCOUNTER — PROCEDURE VISIT (OUTPATIENT)
Dept: VASCULAR SURGERY | Age: 88
End: 2023-08-29
Payer: MEDICARE

## 2023-08-29 DIAGNOSIS — I82.811 ACUTE SUPERFICIAL VENOUS THROMBOSIS OF LOWER EXTREMITY, RIGHT: Primary | ICD-10-CM

## 2023-08-29 DIAGNOSIS — I82.811 ACUTE SUPERFICIAL VENOUS THROMBOSIS OF LOWER EXTREMITY, RIGHT: ICD-10-CM

## 2023-08-29 PROCEDURE — 93971 EXTREMITY STUDY: CPT | Performed by: SURGERY

## 2023-08-30 ENCOUNTER — TELEPHONE (OUTPATIENT)
Dept: CARDIOTHORACIC SURGERY | Age: 88
End: 2023-08-30

## 2023-09-04 DIAGNOSIS — I10 ESSENTIAL HYPERTENSION, BENIGN: ICD-10-CM

## 2023-09-05 RX ORDER — LISINOPRIL AND HYDROCHLOROTHIAZIDE 12.5; 1 MG/1; MG/1
TABLET ORAL
Qty: 90 TABLET | Refills: 2 | Status: SHIPPED | OUTPATIENT
Start: 2023-09-05

## 2023-09-05 NOTE — TELEPHONE ENCOUNTER
Requested Prescriptions     Pending Prescriptions Disp Refills    lisinopril-hydroCHLOROthiazide (PRINZIDE;ZESTORETIC) 10-12.5 MG per tablet [Pharmacy Med Name: LISINOPRIL/HCTZ TABS 10/12.5MG] 90 tablet 3     Sig: TAKE 1 TABLET DAILY         Last OV: 6/21/2023    Last labs: 6/19/2023     F/u: 12/21/2023

## 2023-09-14 ENCOUNTER — OFFICE VISIT (OUTPATIENT)
Dept: VASCULAR SURGERY | Age: 88
End: 2023-09-14
Payer: MEDICARE

## 2023-09-14 VITALS — HEART RATE: 70 BPM | WEIGHT: 155 LBS | BODY MASS INDEX: 23.49 KG/M2 | HEIGHT: 68 IN

## 2023-09-14 DIAGNOSIS — I82.811 ACUTE SUPERFICIAL VENOUS THROMBOSIS OF LOWER EXTREMITY, RIGHT: Primary | ICD-10-CM

## 2023-09-14 PROCEDURE — 1123F ACP DISCUSS/DSCN MKR DOCD: CPT | Performed by: SURGERY

## 2023-09-14 PROCEDURE — 99212 OFFICE O/P EST SF 10 MIN: CPT | Performed by: SURGERY

## 2023-09-14 NOTE — PROGRESS NOTES
Seen back in follow up from R calf SVT documented on venous duplex scan. Leg feels much better as he has completed his naprosyn and continues to wear stockings. EXAM:  R medial calf varix soft without induration or tenderness. All other VVs soft. No edema    A/P: Recurrent VVs B legs with acute R calf SVT - resolved. DC warm compresses. Continue KH 20/30 mmHg compression stocking daily indefinitely. F/U prn - no plans for aggressive intervention. Explained in detail plan and rationale. Pt & wife understand and agree to this plan.

## 2023-09-19 ENCOUNTER — OFFICE VISIT (OUTPATIENT)
Dept: DERMATOLOGY | Age: 88
End: 2023-09-19
Payer: MEDICARE

## 2023-09-19 DIAGNOSIS — L57.0 ACTINIC KERATOSIS: Primary | ICD-10-CM

## 2023-09-19 DIAGNOSIS — L82.1 SK (SEBORRHEIC KERATOSIS): ICD-10-CM

## 2023-09-19 DIAGNOSIS — Z85.828 HISTORY OF NONMELANOMA SKIN CANCER: ICD-10-CM

## 2023-09-19 PROCEDURE — 99213 OFFICE O/P EST LOW 20 MIN: CPT | Performed by: DERMATOLOGY

## 2023-09-19 PROCEDURE — 17000 DESTRUCT PREMALG LESION: CPT | Performed by: DERMATOLOGY

## 2023-09-19 PROCEDURE — 1123F ACP DISCUSS/DSCN MKR DOCD: CPT | Performed by: DERMATOLOGY

## 2023-09-19 PROCEDURE — 17003 DESTRUCT PREMALG LES 2-14: CPT | Performed by: DERMATOLOGY
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
## 2023-09-26 ENCOUNTER — OFFICE VISIT (OUTPATIENT)
Age: 88
End: 2023-09-26
Payer: MEDICARE

## 2023-09-26 VITALS
TEMPERATURE: 97.9 F | BODY MASS INDEX: 24.31 KG/M2 | RESPIRATION RATE: 14 BRPM | WEIGHT: 160.4 LBS | DIASTOLIC BLOOD PRESSURE: 84 MMHG | SYSTOLIC BLOOD PRESSURE: 126 MMHG | HEIGHT: 68 IN | OXYGEN SATURATION: 97 % | HEART RATE: 68 BPM

## 2023-09-26 DIAGNOSIS — R41.3 MEMORY LOSS: ICD-10-CM

## 2023-09-26 DIAGNOSIS — S60.419A ABRASION, FINGER W/O INFECTION: ICD-10-CM

## 2023-09-26 DIAGNOSIS — S80.211A ABRASION OF RIGHT KNEE, INITIAL ENCOUNTER: Primary | ICD-10-CM

## 2023-09-26 DIAGNOSIS — J00 ACUTE NASOPHARYNGITIS: ICD-10-CM

## 2023-09-26 PROCEDURE — 99214 OFFICE O/P EST MOD 30 MIN: CPT | Performed by: FAMILY MEDICINE

## 2023-09-26 PROCEDURE — 1123F ACP DISCUSS/DSCN MKR DOCD: CPT | Performed by: FAMILY MEDICINE

## 2023-09-26 NOTE — TELEPHONE ENCOUNTER
Pts wife called stating they now want [facility] and for a 90 day supply.      LOV: 09/26/23

## 2023-09-26 NOTE — TELEPHONE ENCOUNTER
Requested Prescriptions     Pending Prescriptions Disp Refills    mirabegron (MYRBETRIQ) 50 MG TB24 90 tablet 3     Sig: TAKE 1 TABLET DAILY           Last OV: 9/26/2023    Last labs: 6/19/2023     F/u: 12/21/2023
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
## 2023-11-05 SDOH — HEALTH STABILITY: PHYSICAL HEALTH: ON AVERAGE, HOW MANY MINUTES DO YOU ENGAGE IN EXERCISE AT THIS LEVEL?: 30 MIN

## 2023-11-05 SDOH — HEALTH STABILITY: PHYSICAL HEALTH: ON AVERAGE, HOW MANY DAYS PER WEEK DO YOU ENGAGE IN MODERATE TO STRENUOUS EXERCISE (LIKE A BRISK WALK)?: 3 DAYS

## 2023-11-05 ASSESSMENT — SOCIAL DETERMINANTS OF HEALTH (SDOH)
WITHIN THE LAST YEAR, HAVE YOU BEEN KICKED, HIT, SLAPPED, OR OTHERWISE PHYSICALLY HURT BY YOUR PARTNER OR EX-PARTNER?: NO
WITHIN THE LAST YEAR, HAVE YOU BEEN AFRAID OF YOUR PARTNER OR EX-PARTNER?: NO
WITHIN THE LAST YEAR, HAVE YOU BEEN HUMILIATED OR EMOTIONALLY ABUSED IN OTHER WAYS BY YOUR PARTNER OR EX-PARTNER?: NO
WITHIN THE LAST YEAR, HAVE TO BEEN RAPED OR FORCED TO HAVE ANY KIND OF SEXUAL ACTIVITY BY YOUR PARTNER OR EX-PARTNER?: NO

## 2023-11-08 ENCOUNTER — OFFICE VISIT (OUTPATIENT)
Dept: ORTHOPEDIC SURGERY | Age: 88
End: 2023-11-08

## 2023-11-08 VITALS — HEIGHT: 68 IN | BODY MASS INDEX: 24.25 KG/M2 | WEIGHT: 160 LBS

## 2023-11-08 DIAGNOSIS — M25.511 RIGHT SHOULDER PAIN, UNSPECIFIED CHRONICITY: ICD-10-CM

## 2023-11-08 DIAGNOSIS — S46.011A TRAUMATIC INCOMPLETE TEAR OF RIGHT ROTATOR CUFF, INITIAL ENCOUNTER: Primary | ICD-10-CM

## 2023-11-08 RX ORDER — LIDOCAINE HYDROCHLORIDE 10 MG/ML
8 INJECTION, SOLUTION INFILTRATION; PERINEURAL ONCE
Status: COMPLETED | OUTPATIENT
Start: 2023-11-08 | End: 2023-11-08

## 2023-11-08 RX ORDER — METHYLPREDNISOLONE ACETATE 40 MG/ML
80 INJECTION, SUSPENSION INTRA-ARTICULAR; INTRALESIONAL; INTRAMUSCULAR; SOFT TISSUE ONCE
Status: COMPLETED | OUTPATIENT
Start: 2023-11-08 | End: 2023-11-08

## 2023-11-08 RX ADMIN — LIDOCAINE HYDROCHLORIDE 8 ML: 10 INJECTION, SOLUTION INFILTRATION; PERINEURAL at 17:16

## 2023-11-08 RX ADMIN — METHYLPREDNISOLONE ACETATE 80 MG: 40 INJECTION, SUSPENSION INTRA-ARTICULAR; INTRALESIONAL; INTRAMUSCULAR; SOFT TISSUE at 17:16

## 2023-11-08 NOTE — PROGRESS NOTES
cleansing the injection site with Chlora-prep and using aseptic techniques,  2 CC of Depo Medrol 40mg/ml and 8 CC of 1% lidocaine were injected in the right shoulder. He  tolerated the procedure well with no immediate adverse sequelae after the injection. A bandage was placed over the injection site. Appropriate post injections instructions were given to the patient. Plan:   -Cortisone injection right shoulder  -PT referral  -f/u 4-6 weeks    Jocelynn Oreilly will follow up in 4-6 weeks and/or as needed. He was in agreement with this plan and all questions were answered to his satisfaction. He was encouraged to call with any questions. 11/8/2023  5:25 PM    Trung Kemp MD  Orthopedic Surgery Sports Medicine Fellow    This dictation was performed with a verbal recognition program [facility]) and it was checked for errors. It is possible that there are still dictated errors within this office note. If so, please bring any errors to my attention for an addendum. All efforts were made to ensure that this office note is accurate.  _______________  I was physically present and personally supervised the Orthopaedic Sports Medicine Fellow in the evaluation and development of a treatment plan for this patient. I personally interviewed the patient and performed a physical examination. In addition, I discussed the patient's condition and treatment options with them. I have also reviewed and agree with the past medical, family and social history unless otherwise noted. All of the patient's questions were answered. Stephanie Haney MD, PhD  11/8/2023

## 2023-11-15 ENCOUNTER — HOSPITAL ENCOUNTER (OUTPATIENT)
Dept: PHYSICAL THERAPY | Age: 88
Setting detail: THERAPIES SERIES
Discharge: HOME | End: 2023-11-15
Payer: MEDICARE

## 2023-11-15 DIAGNOSIS — M25.511 RIGHT SHOULDER PAIN, UNSPECIFIED CHRONICITY: Primary | ICD-10-CM

## 2023-11-15 PROCEDURE — 97110 THERAPEUTIC EXERCISES: CPT | Performed by: PHYSICAL THERAPIST

## 2023-11-15 PROCEDURE — 97161 PT EVAL LOW COMPLEX 20 MIN: CPT | Performed by: PHYSICAL THERAPIST

## 2023-11-15 NOTE — FLOWSHEET NOTE
[address] [place] [address] and Therapy [facility], [address] [place] [address] Drive office: [phone] fax: [phone]      Physical Therapy: TREATMENT/PROGRESS NOTE   Patient: Cristopher Cooper (7 y.o. male)   Treatment Date: 11/15/2023   :  1935 MRN: [id number]   Visit #: 1   Insurance Allowable Auth Needed   Medicare []Yes    [x]No    Insurance: Payor: [employer] [place] MEDICARE ADVANTAGE / Plan: [facility] HMO / Product Type: *No Product type* /   Insurance ID: [id number] - (Medicare Managed)  Secondary Insurance (if applicable):    Treatment Diagnosis:     ICD-10-CM    1.  Right shoulder pain, unspecified chronicity  M25.511          Medical Diagnosis:    Traumatic incomplete tear of right rotator cuff, initial encounter [S46.011A]   Referring Physician: Roma Rinaldi MD  PCP: Yolis Ruiz MD                             Plan of care signed (Y/N):     Date of Patient follow up with Physician:      Progress Report/POC: EVAL today  POC update due: (10 visits [address] [place], whichever is less)  12/15/2023         Preferred Language for Healthcare:   [x]English       []other:    SUBJECTIVE EXAMINATION     Patient Report/Comments: see eval     Test used Initial score  11/15/23 11/15/2023   Pain Summary VAS 3-4    Functional questionnaire Upper Extremity functional Scale 53/80 (34%)    Other:                OBJECTIVE EXAMINATION     Observation:     Test measurements: see eval    Exercises/Interventions:     Therapeutic Ex (00715)  resistance Sets/time Reps Notes/Cues/Progressions   Sidelying ER  3\" 15x    Sidelying abd  3\" 10    Scap squeezes  3\" 2 x 10 One set seated, one set standing, cueing to avoid shrug   RTB rows  3\" 15x Needs cueing to avoid shrug   Wall slides flexion   10x Pain-free range          Patient and wife ed    HEP, posture, ice                        Manual Intervention ([phone])  TIME                                        NMR re-education

## 2023-11-16 ENCOUNTER — TELEPHONE (OUTPATIENT)
Dept: ORTHOPEDIC SURGERY | Age: 88
End: 2023-11-16

## 2023-11-22 ENCOUNTER — HOSPITAL ENCOUNTER (OUTPATIENT)
Dept: PHYSICAL THERAPY | Age: 88
Setting detail: THERAPIES SERIES
Discharge: HOME | End: 2023-11-22
Payer: MEDICARE

## 2023-11-22 PROCEDURE — 97110 THERAPEUTIC EXERCISES: CPT | Performed by: PHYSICAL THERAPIST

## 2023-11-22 PROCEDURE — 97140 MANUAL THERAPY 1/> REGIONS: CPT | Performed by: PHYSICAL THERAPIST

## 2023-11-22 NOTE — FLOWSHEET NOTE
[] Met: [] Not Met: [] Adjusted     Therapist goals for Patient:   Short Term Goals: To be achieved in: 2 weeks  Independent in HEP and progression per patient tolerance, in order to progress toward full function and prevent re-injury. Status: [x] Progressing: [] Met: [] Not Met: [] Adjusted  Patient will have a decrease in pain to 0-1/10 to help  facilitate improvement in movement, function, and ADLs as indicated by functional deficits. Status: [x] Progressing: [] Met: [] Not Met: [] Adjusted     Long Term Goals: To be achieved in: 8 weeks  Disability index score of 15% or less for the Upper Extremity Functional Scale to assist with return to prior level of function. Status: [] Progressing: [] Met: [] Not Met: [] Adjusted  Improve right shoulder AROM flexion and abduction to 135 deg to allow for proper joint functioning as indicated by patients functional deficits. Status: [] Progressing: [] Met: [] Not Met: [] Adjusted  Improve right shoulder MMT in flexion and abduction to 4/5 and ER to 4+/5 in order to be able to carry groceries into his house. Status: [] Progressing: [] Met: [] Not Met: [] Adjusted  Patient will be able to lift 3# above shoulder height without increased symptoms or restriction to work towards return to prior level of function. Status: [] Progressing: [] Met: [] Not Met: [] Adjusted    Overall Progression Towards Functional goals/ Treatment Progress Update:  [] Patient is progressing as expected towards functional goals listed. [] Progression is slowed due to complexities/Impairments listed. [] Progression has been slowed due to co-morbidities.   [x] Plan just implemented, too soon (<30days) to assess goals progression   [] Goals require adjustment due to lack of progress  [] Patient is not progressing as expected and requires additional follow up with physician  [] Other:     [address]

## 2023-11-27 DIAGNOSIS — E78.00 PURE HYPERCHOLESTEROLEMIA: ICD-10-CM

## 2023-11-27 RX ORDER — SIMVASTATIN 20 MG
TABLET ORAL
Qty: 90 TABLET | Refills: 1 | Status: SHIPPED | OUTPATIENT
Start: 2023-11-27

## 2023-11-27 NOTE — TELEPHONE ENCOUNTER
Requested Prescriptions     Pending Prescriptions Disp Refills    simvastatin (ZOCOR) 20 MG tablet [Pharmacy Med Name: SIMVASTATIN TABS 20MG] 90 tablet 3     Sig: TAKE 1 TABLET NIGHTLY         Last OV: 9/26/2023    Last labs: 6/19/2023     F/u: 12/21/2023

## 2023-11-29 ENCOUNTER — HOSPITAL ENCOUNTER (OUTPATIENT)
Dept: PHYSICAL THERAPY | Age: 88
Setting detail: THERAPIES SERIES
Discharge: HOME | End: 2023-11-29
Payer: MEDICARE

## 2023-11-29 PROCEDURE — 97112 NEUROMUSCULAR REEDUCATION: CPT | Performed by: PHYSICAL THERAPIST

## 2023-11-29 PROCEDURE — 97140 MANUAL THERAPY 1/> REGIONS: CPT | Performed by: PHYSICAL THERAPIST

## 2023-11-29 PROCEDURE — 97110 THERAPEUTIC EXERCISES: CPT | Performed by: PHYSICAL THERAPIST

## 2023-11-29 NOTE — FLOWSHEET NOTE
and/or proprioception for sitting and/or standing activities    (36718) MANUAL THERAPY-  Manual therapy techniques, 1 or more regions, each 15 minutes (Mobilization/manipulation, manual lymphatic drainage, manual traction) for the purpose of modulating pain, promoting relaxation,  increasing ROM, reducing/eliminating soft tissue swelling/inflammation/restriction, improving soft tissue extensibility and allowing for proper ROM for normal function with self care, mobility, lifting and ambulation    TREATMENT PLAN   Plan: Cont POC- Continue emphasis/focus on exercise progression, improving proper muscle recruitment and activation/motor control patterns, improving soft tissue extensibility, allowing for proper ROM, and improving postural awareness. Next visit plan to progress weights, progress reps, and add new exercises     Electronically Signed by Jerson Wild PT, DPT [id number]               Date: 11/29/2023     Note: If patient does not return for scheduled/recommended follow up visits, this note will serve as a discharge from care along with the most recent update on progress.

## 2023-12-02 ENCOUNTER — PATIENT MESSAGE (OUTPATIENT)
Age: 88
End: 2023-12-02

## 2023-12-02 DIAGNOSIS — R73.02 IMPAIRED GLUCOSE TOLERANCE: Primary | ICD-10-CM

## 2023-12-04 NOTE — TELEPHONE ENCOUNTER
From: Taylor Casey  To: Dr. Eliud Beltran: 12/2/2023 8:17 AM EST  Subject: testing before my Wellness visit    Do you want me to have blood tests before my December 21 visit with you?   Deanna Cano

## 2023-12-06 ENCOUNTER — HOSPITAL ENCOUNTER (OUTPATIENT)
Dept: PHYSICAL THERAPY | Age: 88
Setting detail: THERAPIES SERIES
Discharge: HOME | End: 2023-12-06
Payer: MEDICARE

## 2023-12-06 PROCEDURE — 97140 MANUAL THERAPY 1/> REGIONS: CPT | Performed by: PHYSICAL THERAPIST

## 2023-12-06 PROCEDURE — 97110 THERAPEUTIC EXERCISES: CPT | Performed by: PHYSICAL THERAPIST

## 2023-12-06 PROCEDURE — 97112 NEUROMUSCULAR REEDUCATION: CPT | Performed by: PHYSICAL THERAPIST

## 2023-12-06 NOTE — FLOWSHEET NOTE
standing activities    (77060) MANUAL THERAPY-  Manual therapy techniques, 1 or more regions, each 15 minutes (Mobilization/manipulation, manual lymphatic drainage, manual traction) for the purpose of modulating pain, promoting relaxation,  increasing ROM, reducing/eliminating soft tissue swelling/inflammation/restriction, improving soft tissue extensibility and allowing for proper ROM for normal function with self care, mobility, lifting and ambulation    TREATMENT PLAN   Plan: Cont POC- Continue emphasis/focus on exercise progression, improving proper muscle recruitment and activation/motor control patterns, improving soft tissue extensibility, allowing for proper ROM, and improving postural awareness. Next visit plan to progress weights, progress reps, and add new exercises     Electronically Signed by Danii Crum PT, DPT [id number]               Date: 12/06/2023     Note: If patient does not return for scheduled/recommended follow up visits, this note will serve as a discharge from care along with the most recent update on progress.

## 2023-12-13 DIAGNOSIS — R73.02 IMPAIRED GLUCOSE TOLERANCE: ICD-10-CM

## 2023-12-13 LAB
ANION GAP SERPL CALCULATED.3IONS-SCNC: 9 MMOL/L (ref 3–16)
BUN SERPL-MCNC: 27 MG/DL (ref 7–20)
CALCIUM SERPL-MCNC: 9.6 MG/DL (ref 8.3–10.6)
CHLORIDE SERPL-SCNC: 103 MMOL/L (ref 99–110)
CO2 SERPL-SCNC: 28 MMOL/L (ref 21–32)
CREAT SERPL-MCNC: 0.9 MG/DL (ref 0.8–1.3)
GFR SERPLBLD CREATININE-BSD FMLA CKD-EPI: >60 ML/MIN/{1.73_M2}
GLUCOSE SERPL-MCNC: 94 MG/DL (ref 70–99)
POTASSIUM SERPL-SCNC: 4.5 MMOL/L (ref 3.5–5.1)
SODIUM SERPL-SCNC: 140 MMOL/L (ref 136–145)

## 2023-12-14 LAB
EST. AVERAGE GLUCOSE BLD GHB EST-MCNC: 116.9 MG/DL
HBA1C MFR BLD: 5.7 %

## 2023-12-27 ENCOUNTER — OFFICE VISIT (OUTPATIENT)
Dept: ORTHOPEDIC SURGERY | Age: 88
End: 2023-12-27
Payer: MEDICARE

## 2023-12-27 VITALS — BODY MASS INDEX: 24.91 KG/M2 | HEIGHT: 66 IN | WEIGHT: 155 LBS

## 2023-12-27 DIAGNOSIS — S46.011A TRAUMATIC INCOMPLETE TEAR OF RIGHT ROTATOR CUFF, INITIAL ENCOUNTER: Primary | ICD-10-CM

## 2023-12-27 PROCEDURE — 1123F ACP DISCUSS/DSCN MKR DOCD: CPT | Performed by: ORTHOPAEDIC SURGERY

## 2023-12-27 PROCEDURE — 99213 OFFICE O/P EST LOW 20 MIN: CPT | Performed by: ORTHOPAEDIC SURGERY

## 2023-12-27 NOTE — PROGRESS NOTES
Chief Complaint    Shoulder Pain (F/U RIGHT SHOULDER)      History of Present Illness:  Lisa Raymundo is a pleasant, 80 y.o., male, here today for follow up of his right shoulder. He has been under our care for an acute on chronic rotator cuff tear. We proceeded with a corticosteroid injection at his last visit. Additionally he has been in physical therapy at the [facility]. Overall he has noticed an improvement in function and overhead ROM, however he continues to have soreness in the shoulder. He endorses deltoid referred pain with certain positions. He reports no new injuries or setbacks. Pain Assessment  Location of Pain: Shoulder  Location Modifiers: Right  Severity of Pain: 2  Quality of Pain: Aching  Aggravating Factors: Other (Comment), Exercise, Straightening, Stretching  Limiting Behavior: Some  Relieving Factors: Rest, Exercise  Work-Related Injury: No  Are there other pain locations you wish to document?: No      Medical History:  Patient's medications, allergies, past medical, surgical, social and family histories were reviewed and updated as appropriate. No notes on file    Review of Systems  A 14 point review of systems was completed by the patient and is available in the media section of the scanned medical record and was reviewed on 12/27/2023. The review is negative with the exception of those things mentioned in the HPI and Past Medical History    Vital Signs: There were no vitals filed for this visit. General/Appearance: Alert and oriented and in no apparent distress. Skin:  There are no skin lesions, cellulitis, or extreme edema. The patient has warm and well-perfused Bilateral upper extremities with brisk capillary refill. Right Shoulder Exam:  Inspection: No gross deformities, no signs of infection. Palpation: no tenderness over the [facility] [place] joint, Rotator cuff or biceps    Active Range of Motion:   Forward Elevation 150, Abduction 130, External Rotation 30, Internal

## 2024-01-07 SDOH — HEALTH STABILITY: PHYSICAL HEALTH: ON AVERAGE, HOW MANY MINUTES DO YOU ENGAGE IN EXERCISE AT THIS LEVEL?: 30 MIN

## 2024-01-07 SDOH — HEALTH STABILITY: PHYSICAL HEALTH: ON AVERAGE, HOW MANY DAYS PER WEEK DO YOU ENGAGE IN MODERATE TO STRENUOUS EXERCISE (LIKE A BRISK WALK)?: 3 DAYS

## 2024-01-10 ENCOUNTER — OFFICE VISIT (OUTPATIENT)
Dept: ORTHOPEDIC SURGERY | Age: 89
End: 2024-01-10

## 2024-01-10 VITALS — BODY MASS INDEX: 24.91 KG/M2 | WEIGHT: 155 LBS | HEIGHT: 66 IN

## 2024-01-10 DIAGNOSIS — M17.11 OSTEOARTHRITIS OF RIGHT KNEE, UNSPECIFIED OSTEOARTHRITIS TYPE: Primary | ICD-10-CM

## 2024-01-10 DIAGNOSIS — M25.561 ACUTE PAIN OF RIGHT KNEE: ICD-10-CM

## 2024-01-10 RX ORDER — TRIAMCINOLONE ACETONIDE 40 MG/ML
40 INJECTION, SUSPENSION INTRA-ARTICULAR; INTRAMUSCULAR ONCE
Status: COMPLETED | OUTPATIENT
Start: 2024-01-10 | End: 2024-01-10

## 2024-01-10 RX ORDER — LIDOCAINE HYDROCHLORIDE 10 MG/ML
4 INJECTION, SOLUTION EPIDURAL; INFILTRATION; INTRACAUDAL; PERINEURAL ONCE
Status: COMPLETED | OUTPATIENT
Start: 2024-01-10 | End: 2024-01-10

## 2024-01-10 RX ADMIN — LIDOCAINE HYDROCHLORIDE 4 ML: 10 INJECTION, SOLUTION EPIDURAL; INFILTRATION; INTRACAUDAL; PERINEURAL at 11:34

## 2024-01-10 RX ADMIN — TRIAMCINOLONE ACETONIDE 40 MG: 40 INJECTION, SUSPENSION INTRA-ARTICULAR; INTRAMUSCULAR at 11:35

## 2024-01-10 NOTE — PROGRESS NOTES
1 % injection 4 mL  -     triamcinolone acetonide (KENALOG-40) injection 40 mg  -     MI ARTHROCENTESIS ASPIR&/INJ MAJOR JT/BURSA W/O US      88-year-old male with end-stage medial compartment arthritis.  We did a steroid injection today for symptomatic management but I did  him that his arthritis is pretty severe and this likely would not last very long.  I do think he would be an acceptable candidate for a medial unicompartmental knee replacement as a less invasive surgery which should be a bit easier for him to recover from at his age.  We will see what relief and how long he gets from the steroid injection and we also discussed gel injections as another option other than surgery.  Can take Tylenol and over-the-counter anti-inflammatories as needed and follow-up as needed.      Electronically signed by Hilton Tolentino MD on 1/10/2024 at 12:55 PM  This dictation was generated by voice recognition computer software.  Although all attempts are made to edit the dictation for accuracy, there may be errors in the transcription that are not intended.    Total time spent reviewing past notes, imaging, labs, clinical exam, and treatment plan was > 45 min.

## 2024-01-15 DIAGNOSIS — I63.9 CEREBELLAR INFARCT (HCC): Primary | ICD-10-CM

## 2024-01-15 RX ORDER — ASPIRIN 81 MG/1
81 TABLET ORAL DAILY
Qty: 90 TABLET | Refills: 1 | COMMUNITY
Start: 2024-01-15

## 2024-02-06 ENCOUNTER — OFFICE VISIT (OUTPATIENT)
Age: 89
End: 2024-02-06
Payer: MEDICARE

## 2024-02-06 VITALS
HEART RATE: 68 BPM | OXYGEN SATURATION: 97 % | RESPIRATION RATE: 16 BRPM | DIASTOLIC BLOOD PRESSURE: 70 MMHG | WEIGHT: 159.2 LBS | BODY MASS INDEX: 25.58 KG/M2 | SYSTOLIC BLOOD PRESSURE: 132 MMHG | TEMPERATURE: 97.3 F | HEIGHT: 66 IN

## 2024-02-06 DIAGNOSIS — S41.111A SKIN TEAR OF RIGHT UPPER EXTREMITY: Primary | ICD-10-CM

## 2024-02-06 DIAGNOSIS — Z23 NEED FOR TDAP VACCINATION: ICD-10-CM

## 2024-02-06 DIAGNOSIS — S80.212A ABRASION, LEFT KNEE, INITIAL ENCOUNTER: ICD-10-CM
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
## 2024-02-06 PROCEDURE — 1123F ACP DISCUSS/DSCN MKR DOCD: CPT | Performed by: FAMILY MEDICINE

## 2024-02-06 PROCEDURE — 99213 OFFICE O/P EST LOW 20 MIN: CPT | Performed by: FAMILY MEDICINE

## 2024-02-06 PROCEDURE — 90715 TDAP VACCINE 7 YRS/> IM: CPT | Performed by: FAMILY MEDICINE

## 2024-02-06 PROCEDURE — 90471 IMMUNIZATION ADMIN: CPT | Performed by: FAMILY MEDICINE

## 2024-02-06 ASSESSMENT — PATIENT HEALTH QUESTIONNAIRE - PHQ9
SUM OF ALL RESPONSES TO PHQ QUESTIONS 1-9: 0
2. FEELING DOWN, DEPRESSED OR HOPELESS: 0
1. LITTLE INTEREST OR PLEASURE IN DOING THINGS: 0
SUM OF ALL RESPONSES TO PHQ QUESTIONS 1-9: 0
SUM OF ALL RESPONSES TO PHQ QUESTIONS 1-9: 0
SUM OF ALL RESPONSES TO PHQ9 QUESTIONS 1 & 2: 0
SUM OF ALL RESPONSES TO PHQ QUESTIONS 1-9: 0

## 2024-02-06 NOTE — PROGRESS NOTES
Subjective:      Patient ID: Tommy Amato 88 y.o. male. is here for evaluation for fall.      HPI    Was on his patio; has a table turned on it's side and he was not paying attention.  Hit the table and tripped.  Hit the right upper arm either on the table or the cement patio.  Occurred last night.  Bled a lot.  Is itching but not painful.  No numbness in the hand or weakness,  Also has abrasion of the left knee. Sore but not painful.  Able to ambulate.   Washed cuts with \"blood stop\".  No head injury or LOC.     Outpatient Medications Marked as Taking for the 2/6/24 encounter (Office Visit) with Columba Oviedo MD   Medication Sig Dispense Refill    aspirin 81 MG EC tablet Take 1 tablet by mouth daily 90 tablet 1    diclofenac sodium (VOLTAREN) 1 % GEL Apply 4 g topically 2 times daily      simvastatin (ZOCOR) 20 MG tablet TAKE 1 TABLET NIGHTLY 90 tablet 1    mirabegron (MYRBETRIQ) 50 MG TB24 TAKE 1 TABLET DAILY 90 tablet 3    lisinopril-hydroCHLOROthiazide (PRINZIDE;ZESTORETIC) 10-12.5 MG per tablet TAKE 1 TABLET DAILY 90 tablet 2    clobetasol (TEMOVATE) 0.05 % cream       Polyvinyl Alcohol-Povidone PF (REFRESH) 1.4-0.6 % SOLN ophthalmic solution Place 1 drop into both eyes as needed      linaclotide (LINZESS) 145 MCG capsule Take 1 capsule by mouth every morning (before breakfast) 90 capsule 1    Multiple Vitamins-Minerals (CENTRUM SILVER 50+MEN PO) Take 1 tablet by mouth      vitamin B-12 (CYANOCOBALAMIN) 1000 MCG tablet Take 1 tablet by mouth daily      BIOTIN 5000 PO Take by mouth          Allergies   Allergen Reactions    Percodan [Oxycodone-Aspirin] Nausea Only       Patient Active Problem List   Diagnosis    Overactive bladder    Pure hypercholesterolemia    Lichen planus    Essential hypertension, benign    Dry mouth    Benign non-nodular prostatic hyperplasia with lower urinary tract symptoms    Eczema, dyshidrotic    Chronic idiopathic constipation    Primary osteoarthritis of right knee

## 2024-02-09 ENCOUNTER — TELEPHONE (OUTPATIENT)
Dept: FAMILY MEDICINE CLINIC | Age: 89
End: 2024-02-09

## 2024-02-09 DIAGNOSIS — S41.111A SKIN TEAR OF RIGHT UPPER EXTREMITY: Primary | ICD-10-CM

## 2024-02-09 NOTE — TELEPHONE ENCOUNTER
Patient's wife called concerning his wound care. She says it is not getting any better, and she did what the doctor instructed. She is changing the dressing after he bathes, but it is still bleeding a lot, swollen, has redness and the skin is lying separate. She would like a call back as soon as possible at [phone], thank you.

## 2024-02-12 ENCOUNTER — HOSPITAL ENCOUNTER (OUTPATIENT)
Dept: WOUND CARE | Age: 89
Discharge: HOME | End: 2024-02-12
Attending: SPECIALIST
Payer: MEDICARE

## 2024-02-12 VITALS
TEMPERATURE: 97.1 F | DIASTOLIC BLOOD PRESSURE: 76 MMHG | BODY MASS INDEX: 25.86 KG/M2 | WEIGHT: 160.94 LBS | SYSTOLIC BLOOD PRESSURE: 149 MMHG | HEART RATE: 77 BPM | RESPIRATION RATE: 16 BRPM | HEIGHT: 66 IN

## 2024-02-12 DIAGNOSIS — S41.111A ISTAP TYPE 3 SKIN TEAR OF RIGHT UPPER ARM: Primary | ICD-10-CM

## 2024-02-12 PROCEDURE — 99203 OFFICE O/P NEW LOW 30 MIN: CPT | Performed by: SPECIALIST

## 2024-02-12 PROCEDURE — 6370000000 HC RX 637 (ALT 250 FOR IP): Performed by: SPECIALIST

## 2024-02-12 PROCEDURE — 99213 OFFICE O/P EST LOW 20 MIN: CPT

## 2024-02-12 RX ORDER — GINSENG 100 MG
CAPSULE ORAL ONCE
OUTPATIENT
Start: 2024-02-12 | End: 2024-02-12

## 2024-02-12 RX ORDER — BETAMETHASONE DIPROPIONATE 0.5 MG/G
CREAM TOPICAL ONCE
OUTPATIENT
Start: 2024-02-12 | End: 2024-02-12

## 2024-02-12 RX ORDER — BACITRACIN ZINC AND POLYMYXIN B SULFATE 500; 1000 [USP'U]/G; [USP'U]/G
OINTMENT TOPICAL ONCE
OUTPATIENT
Start: 2024-02-12 | End: 2024-02-12

## 2024-02-12 RX ORDER — LIDOCAINE HYDROCHLORIDE 20 MG/ML
JELLY TOPICAL ONCE
OUTPATIENT
Start: 2024-02-12 | End: 2024-02-12

## 2024-02-12 RX ORDER — LIDOCAINE HYDROCHLORIDE 40 MG/ML
SOLUTION TOPICAL ONCE
Status: COMPLETED | OUTPATIENT
Start: 2024-02-12 | End: 2024-02-12

## 2024-02-12 RX ORDER — IBUPROFEN 200 MG
TABLET ORAL ONCE
OUTPATIENT
Start: 2024-02-12 | End: 2024-02-12

## 2024-02-12 RX ORDER — LIDOCAINE 50 MG/G
OINTMENT TOPICAL ONCE
OUTPATIENT
Start: 2024-02-12 | End: 2024-02-12

## 2024-02-12 RX ORDER — LIDOCAINE 40 MG/G
CREAM TOPICAL ONCE
OUTPATIENT
Start: 2024-02-12 | End: 2024-02-12

## 2024-02-12 RX ORDER — GENTAMICIN SULFATE 1 MG/G
OINTMENT TOPICAL ONCE
OUTPATIENT
Start: 2024-02-12 | End: 2024-02-12

## 2024-02-12 RX ORDER — SODIUM CHLOR/HYPOCHLOROUS ACID 0.033 %
SOLUTION, IRRIGATION IRRIGATION ONCE
OUTPATIENT
Start: 2024-02-12 | End: 2024-02-12

## 2024-02-12 RX ORDER — TRIAMCINOLONE ACETONIDE 1 MG/G
OINTMENT TOPICAL ONCE
OUTPATIENT
Start: 2024-02-12 | End: 2024-02-12

## 2024-02-12 RX ORDER — CLOBETASOL PROPIONATE 0.5 MG/G
OINTMENT TOPICAL ONCE
OUTPATIENT
Start: 2024-02-12 | End: 2024-02-12

## 2024-02-12 RX ORDER — LIDOCAINE HYDROCHLORIDE 40 MG/ML
SOLUTION TOPICAL ONCE
OUTPATIENT
Start: 2024-02-12 | End: 2024-02-12

## 2024-02-12 RX ADMIN — LIDOCAINE HYDROCHLORIDE: 40 SOLUTION TOPICAL at 10:27

## 2024-02-12 NOTE — PATIENT INSTRUCTIONS
[facility]  Patient Instructions and Physician Orders  [address]  Telephone: [phone] FAX [phone]    NAME:  Tommy Amato  YOB: 1935  DATE: 2/12/24     Return Appointment:  Return Appointment: With Jamie Owusu MD  in  1 Week(s)  [] Return Appointment for a Wound Assessment with the nurse on:     Future Appointments   Date Time Provider Department Center   2/23/2024  2:30 PM Wanda Griffin AuD [facility]   3/20/2024  1:45 PM Jun Perea MD [facility]   3/26/2024  4:00 PM SCHEDULE, [facility]  [facility]  [facility]   6/4/2024 11:30 AM Columba Oviedo MD [facility]  [facility]   9/19/2024  1:45 PM Jun Perea MD [facility]         No orders of the defined types were placed in this encounter.      Home Care Company: none    Medically necessary services for evaluation and treatment: []Skilled Nursing (using clean technique) []PT (Eval & Treat) []OT (Eval & Treat) []Social Work []Dietician []Other:      Wound care instructions:  If you smoke we ask that you refrain from smoking. Smoking inhibits wounds from healing.  When taking antibiotics take the entire prescription as ordered. Do not stop taking until medication is all gone unless otherwise instructed.   Exercise as tolerated.   Keep weight off wounds and reposition every 2 hours if applicable.  Do not get wounds wet in the bath or shower unless otherwise instructed by your physician. If your wound is on your foot or leg, you may purchase a cast bag. Please ask at the pharmacy.  Wash hands with soap and water prior to and after every dressing change.    [x]Wash wounds with: 0.9% normal saline  [x]Madelin wound Topical Treatments: Do not apply lotions, creams, or ointments to the skin around the wound bed unless directed as followed:   Apply around the wound: Nothing         [x]Wound Location: right upper arm   Apply Primary Dressing to wound: Collagen (I.e.

## 2024-02-12 NOTE — PROGRESS NOTES
technique) []PT (Eval & Treat) []OT (Eval & Treat) []Social Work []Dietician []Other:      Wound care instructions:  If you smoke we ask that you refrain from smoking. Smoking inhibits wounds from healing.  When taking antibiotics take the entire prescription as ordered. Do not stop taking until medication is all gone unless otherwise instructed.   Exercise as tolerated.   Keep weight off wounds and reposition every 2 hours if applicable.  Do not get wounds wet in the bath or shower unless otherwise instructed by your physician. If your wound is on your foot or leg, you may purchase a cast bag. Please ask at the pharmacy.  Wash hands with soap and water prior to and after every dressing change.    [x]Wash wounds with: 0.9% normal saline  [x]Madelin wound Topical Treatments: Do not apply lotions, creams, or ointments to the skin around the wound bed unless directed as followed:   Apply around the wound: Nothing         [x]Wound Location: right upper arm   Apply Primary Dressing to wound: Collagen (I.e. Puracol)- lightly moisten with normal saline  Secondary Dressing: 4X4 gauze pad and Conforming roll gauze   Avoid contact of tape with skin if possible.  When to change Dressing: 3 times per week:Monday/Wednesday/Friday    Dietary:  Important dietary reminders:  1. Increase Protein intake (i.e. Lean meats, fish, eggs, legumes, and yogurt)  2. No added salt  3. If diabetic, follow a diabetic diet and check glucose prior to meals or as instructed by your physician.    Dietary Supplements(Take twice a day unless instructed otherwise):  [] Benito  [] 30ml ProStat [] Ensure Complete [] Ensure Max/Premier [] Expedite [] Other:    Your nurse  is:  luna     Electronically signed by Luna Luque RN on 2/12/2024 at 11:14 AM     Wound Care Center Information: Should you experience any significant changes in your wound(s) or have questions about your wound care, please contact the [facility] at

## 2024-02-13 NOTE — TELEPHONE ENCOUNTER
Requested Prescriptions     Pending Prescriptions Disp Refills    mirabegron (MYRBETRIQ) 50 MG TB24 [Pharmacy Med Name: MYRBETRIQ TABS 50MG] 90 tablet 3     Sig: TAKE 1 TABLET DAILY         Last OV: 2/6/2024    Last labs: 12/13/2023     F/u: 6/4/2024

## 2024-02-19 ENCOUNTER — HOSPITAL ENCOUNTER (OUTPATIENT)
Dept: WOUND CARE | Age: 89
Discharge: HOME | End: 2024-02-19
Attending: SPECIALIST
Payer: MEDICARE

## 2024-02-19 VITALS
TEMPERATURE: 97.3 F | SYSTOLIC BLOOD PRESSURE: 131 MMHG | HEART RATE: 71 BPM | RESPIRATION RATE: 16 BRPM | DIASTOLIC BLOOD PRESSURE: 60 MMHG

## 2024-02-19 DIAGNOSIS — S41.111A ISTAP TYPE 3 SKIN TEAR OF RIGHT UPPER ARM: Primary | ICD-10-CM

## 2024-02-19 PROCEDURE — 99213 OFFICE O/P EST LOW 20 MIN: CPT

## 2024-02-19 PROCEDURE — 6370000000 HC RX 637 (ALT 250 FOR IP): Performed by: SPECIALIST

## 2024-02-19 PROCEDURE — 99212 OFFICE O/P EST SF 10 MIN: CPT | Performed by: SPECIALIST

## 2024-02-19 RX ORDER — LIDOCAINE HYDROCHLORIDE 20 MG/ML
JELLY TOPICAL ONCE
OUTPATIENT
Start: 2024-02-19 | End: 2024-02-19

## 2024-02-19 RX ORDER — LIDOCAINE 40 MG/G
CREAM TOPICAL ONCE
OUTPATIENT
Start: 2024-02-19 | End: 2024-02-19

## 2024-02-19 RX ORDER — LIDOCAINE HYDROCHLORIDE 40 MG/ML
SOLUTION TOPICAL ONCE
OUTPATIENT
Start: 2024-02-19 | End: 2024-02-19

## 2024-02-19 RX ORDER — LIDOCAINE HYDROCHLORIDE 40 MG/ML
SOLUTION TOPICAL ONCE
Status: COMPLETED | OUTPATIENT
Start: 2024-02-19 | End: 2024-02-19

## 2024-02-19 RX ORDER — BACITRACIN ZINC AND POLYMYXIN B SULFATE 500; 1000 [USP'U]/G; [USP'U]/G
OINTMENT TOPICAL ONCE
OUTPATIENT
Start: 2024-02-19 | End: 2024-02-19

## 2024-02-19 RX ORDER — GINSENG 100 MG
CAPSULE ORAL ONCE
OUTPATIENT
Start: 2024-02-19 | End: 2024-02-19

## 2024-02-19 RX ORDER — IBUPROFEN 200 MG
TABLET ORAL ONCE
OUTPATIENT
Start: 2024-02-19 | End: 2024-02-19

## 2024-02-19 RX ORDER — CLOBETASOL PROPIONATE 0.5 MG/G
OINTMENT TOPICAL ONCE
OUTPATIENT
Start: 2024-02-19 | End: 2024-02-19

## 2024-02-19 RX ORDER — TRIAMCINOLONE ACETONIDE 1 MG/G
OINTMENT TOPICAL ONCE
OUTPATIENT
Start: 2024-02-19 | End: 2024-02-19

## 2024-02-19 RX ORDER — LIDOCAINE 50 MG/G
OINTMENT TOPICAL ONCE
OUTPATIENT
Start: 2024-02-19 | End: 2024-02-19

## 2024-02-19 RX ORDER — GENTAMICIN SULFATE 1 MG/G
OINTMENT TOPICAL ONCE
OUTPATIENT
Start: 2024-02-19 | End: 2024-02-19

## 2024-02-19 RX ORDER — BETAMETHASONE DIPROPIONATE 0.5 MG/G
CREAM TOPICAL ONCE
OUTPATIENT
Start: 2024-02-19 | End: 2024-02-19

## 2024-02-19 RX ORDER — SODIUM CHLOR/HYPOCHLOROUS ACID 0.033 %
SOLUTION, IRRIGATION IRRIGATION ONCE
OUTPATIENT
Start: 2024-02-19 | End: 2024-02-19

## 2024-02-19 RX ADMIN — LIDOCAINE HYDROCHLORIDE: 40 SOLUTION TOPICAL at 10:22

## 2024-02-19 ASSESSMENT — PAIN SCALES - GENERAL: PAINLEVEL_OUTOF10: 0

## 2024-02-19 NOTE — PATIENT INSTRUCTIONS
wound: Collagen (I.e. Puracol)- lightly moisten with normal saline  Secondary Dressing: 4X4 gauze pad and Conforming roll gauze   Avoid contact of tape with skin if possible.  When to change Dressin TIMES A WEEK: MONDAY AND THURSDAY    Dietary:  Important dietary reminders:  1. Increase Protein intake (i.e. Lean meats, fish, eggs, legumes, and yogurt)  2. No added salt  3. If diabetic, follow a diabetic diet and check glucose prior to meals or as instructed by your physician.    Dietary Supplements(Take twice a day unless instructed otherwise):  [] Benito  [] 30ml ProStat [] Ensure Complete [] Ensure Max/Premier [] Expedite [] Other:    Your nurse  is:  luna     Electronically signed by Luna Luque RN on 2024 at 11:15 AM     Wound Care Center Information: Should you experience any significant changes in your wound(s) or have questions about your wound care, please contact the [facility] at [phone].   Hours of operation:  Mon:  8AM - 2PM  Tue: 11AM - 5PM  Wed: CLOSED  Thur: 8AM - 4:30PM  Fri:  8AM - 4:30PM  The office is closed on all major holidays.    Please give us 24-48 business hours to return your call.  These hours of operation are subject to change. If you need help with your wounds and cannot wait until we are available, contact your PCP or go to your preferred emergency room.     Call your doctor now or seek immediate medical care if:    You have symptoms of infection, such as:  Increased pain, swelling, warmth, or redness.  Red streaks leading from the area.  Pus draining from the area.  A fever.
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
## 2024-02-23 ENCOUNTER — PROCEDURE VISIT (OUTPATIENT)
Dept: AUDIOLOGY | Age: 89
End: 2024-02-23

## 2024-02-23 DIAGNOSIS — H90.3 SENSORINEURAL HEARING LOSS (SNHL) OF BOTH EARS: Primary | ICD-10-CM

## 2024-02-23 NOTE — PROGRESS NOTES
Tommy Amato   1935, 88 y.o. male   [id number]       Referring Provider: SELF  Referral Type: N/A    Reason for Visit: Evaluation of suspected change in hearing, tinnitus, or balance.      ADULT AUDIOLOGIC EVALUATION      Tommy Amato is a 88 y.o. male seen today, 2/23/2024, for an initial audiologic evaluation.  Patient was seen accompanied by his wife.    AUDIOLOGIC AND OTHER PERTINENT MEDICAL HISTORY:        Tommy Amato noted decreased hearing bilaterally, gradual, notices difficulty hearing softer voices and understanding the television, he feels like both ears hear about the same; can have some brief instances of ringing and ear fullness, resolves quickly; he noted two recent falls and is establishing balance therapy .      Tommy Amato denied otalgia, otorrhea, history of occupational/recreational noise exposure, history of ear surgery, and family history of hearing loss.    IMPRESSIONS:       Today's results are consistent with bilateral sensorineural hearing loss with normal middle ear function and excellent word recognition for both ears.  Hearing loss is significant enough to result in difficulty understanding speech in at least some listening environments.  Discussed good communication strategies and recommended hearing aid evaluation.    ASSESSMENT AND FINDINGS:       Otoscopy revealed: Clear ear canals bilaterally      RIGHT EAR:  Hearing Sensitivity: Within normal limits through 1000 Hz steeply sloping to severe sensorineural hearing loss.  Speech Recognition Threshold: 25 dBHL  Word Recognition: Excellent (96%), based on NU-6 25-word list at 60 dBHL using recorded speech stimuli.    Tympanometry: Normal peak pressure and compliance, Type A tympanogram, consistent with normal middle ear function.      LEFT EAR:  Hearing Sensitivity: Within normal limits through 1000 Hz steeply sloping to severe sensorineural hearing loss.  Speech Recognition Threshold: 20 dBHL  Word Recognition: Excellent

## 2024-02-26 ENCOUNTER — HOSPITAL ENCOUNTER (OUTPATIENT)
Dept: WOUND CARE | Age: 89
Discharge: HOME | End: 2024-02-26
Attending: SPECIALIST
Payer: MEDICARE

## 2024-02-26 VITALS
HEART RATE: 86 BPM | SYSTOLIC BLOOD PRESSURE: 147 MMHG | RESPIRATION RATE: 18 BRPM | DIASTOLIC BLOOD PRESSURE: 75 MMHG | TEMPERATURE: 97.1 F

## 2024-02-26 DIAGNOSIS — S41.111A ISTAP TYPE 3 SKIN TEAR OF RIGHT UPPER ARM: Primary | ICD-10-CM

## 2024-02-26 PROCEDURE — 99212 OFFICE O/P EST SF 10 MIN: CPT | Performed by: SPECIALIST

## 2024-02-26 PROCEDURE — 99212 OFFICE O/P EST SF 10 MIN: CPT

## 2024-02-26 RX ORDER — BACITRACIN ZINC AND POLYMYXIN B SULFATE 500; 1000 [USP'U]/G; [USP'U]/G
OINTMENT TOPICAL ONCE
Status: CANCELLED | OUTPATIENT
Start: 2024-02-26 | End: 2024-02-26

## 2024-02-26 RX ORDER — LIDOCAINE HYDROCHLORIDE 40 MG/ML
SOLUTION TOPICAL ONCE
Status: CANCELLED | OUTPATIENT
Start: 2024-02-26 | End: 2024-02-26

## 2024-02-26 RX ORDER — CLOBETASOL PROPIONATE 0.5 MG/G
OINTMENT TOPICAL ONCE
Status: CANCELLED | OUTPATIENT
Start: 2024-02-26 | End: 2024-02-26

## 2024-02-26 RX ORDER — TRIAMCINOLONE ACETONIDE 1 MG/G
OINTMENT TOPICAL ONCE
Status: CANCELLED | OUTPATIENT
Start: 2024-02-26 | End: 2024-02-26

## 2024-02-26 RX ORDER — SODIUM CHLOR/HYPOCHLOROUS ACID 0.033 %
SOLUTION, IRRIGATION IRRIGATION ONCE
Status: CANCELLED | OUTPATIENT
Start: 2024-02-26 | End: 2024-02-26

## 2024-02-26 RX ORDER — GINSENG 100 MG
CAPSULE ORAL ONCE
Status: CANCELLED | OUTPATIENT
Start: 2024-02-26 | End: 2024-02-26

## 2024-02-26 RX ORDER — LIDOCAINE HYDROCHLORIDE 20 MG/ML
JELLY TOPICAL ONCE
Status: CANCELLED | OUTPATIENT
Start: 2024-02-26 | End: 2024-02-26

## 2024-02-26 RX ORDER — BETAMETHASONE DIPROPIONATE 0.5 MG/G
CREAM TOPICAL ONCE
Status: CANCELLED | OUTPATIENT
Start: 2024-02-26 | End: 2024-02-26

## 2024-02-26 RX ORDER — LIDOCAINE 50 MG/G
OINTMENT TOPICAL ONCE
Status: CANCELLED | OUTPATIENT
Start: 2024-02-26 | End: 2024-02-26

## 2024-02-26 RX ORDER — LIDOCAINE 40 MG/G
CREAM TOPICAL ONCE
Status: CANCELLED | OUTPATIENT
Start: 2024-02-26 | End: 2024-02-26

## 2024-02-26 RX ORDER — IBUPROFEN 200 MG
TABLET ORAL ONCE
Status: CANCELLED | OUTPATIENT
Start: 2024-02-26 | End: 2024-02-26

## 2024-02-26 RX ORDER — GENTAMICIN SULFATE 1 MG/G
OINTMENT TOPICAL ONCE
Status: CANCELLED | OUTPATIENT
Start: 2024-02-26 | End: 2024-02-26

## 2024-02-26 NOTE — PROGRESS NOTES
02/12/24 1027   Dressing Status New dressing applied 02/12/24 1127   Dressing/Treatment Other (comment) 02/26/24 1036   Wound Length (cm) 0 cm 02/26/24 1019   Wound Width (cm) 0 cm 02/26/24 1019   Wound Depth (cm) 0 cm 02/26/24 1019   Wound Surface Area (cm^2) 0 cm^2 02/26/24 1019   Change in Wound Size % (l*w) 100 02/26/24 1019   Wound Volume (cm^3) 0 cm^3 02/26/24 1019   Wound Healing % 100 02/26/24 1019   Distance Tunneling (cm) 0 cm 02/12/24 1027   Undermining Maxium Distance (cm) 0 02/12/24 1027   Wound Assessment Bleeding;Pink/red 02/19/24 1014   Drainage Amount None (dry) 02/19/24 1014   Drainage Description Serosanguinous 02/12/24 1027   Odor None 02/19/24 1014   Madelin-wound Assessment Intact;Fragile;Hyperpigmented 02/19/24 1014   Margins Defined edges 02/19/24 1014   Wound Thickness Description not for Pressure Injury Full thickness 02/19/24 1014   Number of days: 14          Plan:     Treatment Note please see attached Discharge Instructions    New Prescriptions    No medications on file       Orders Placed This Encounter   Procedures    Initiate Outpatient Wound Care Protocol       Written patient dismissal instructions given to patient and signed by patient or POA.           Patient Instructions   DISCHARGE INSTRUCTIONS  Wound Clinic Physician Orders   [facility]   [facility] [address]  Telephone: [phone] FAX [phone]    NAME:  Tommy Amato  YOB: 1935  MEDICAL RECORD NUMBER:  [id number]  DATE:  2/26/2024      Congratulations!! You have completed your treatment.   Return to your Primary Care Physician for all your health issues.   Resume your ordinary activities as tolerated.   Take your medications as prescribed by your primary care physician.   Check your skin daily for cracks, bruises, sores, or dryness. Use a moisturizer as needed.   Clean and dry your skin, using mild soap and warm water (not hot).   Avoid alcohol and

## 2024-02-26 NOTE — PATIENT INSTRUCTIONS
DISCHARGE INSTRUCTIONS  Wound Clinic Physician Orders   [facility]   [address]  Telephone: [phone] FAX [phone]    NAME:  Tommy Amato  YOB: 1935  MEDICAL RECORD NUMBER:  [id number]  DATE:  2/26/2024      Congratulations!! You have completed your treatment.   Return to your Primary Care Physician for all your health issues.   Resume your ordinary activities as tolerated.   Take your medications as prescribed by your primary care physician.   Check your skin daily for cracks, bruises, sores, or dryness. Use a moisturizer as needed.   Clean and dry your skin, using mild soap and warm water (not hot).   Avoid alcohol and caffeine and do not smoke.   Maintain a nutritious diet. Ask your primary care doctor about adding a multivitamin to your medication regimen.   Avoid pressure on your healed wound site.     Additional instructions for healed wound/skin care: marathon skin barrier applied in wound care center and will eventually wear off. No scrubbing or rubbing       THANK YOU FOR ALLOWING US TO SERVE YOU. PLEASE CALL IF YOU DEVELOP ANOTHER WOUND [phone]    [] Patient unable to sign Discharge Instructions given to ECF/Transportation/POA    Electronically signed by Montserrat Luque RN on 2/26/2024 at 10:37 AM

## 2024-03-20 ENCOUNTER — OFFICE VISIT (OUTPATIENT)
Dept: DERMATOLOGY | Age: 89
End: 2024-03-20
Payer: MEDICARE

## 2024-03-20 DIAGNOSIS — D48.5 NEOPLASM OF UNCERTAIN BEHAVIOR OF SKIN: ICD-10-CM

## 2024-03-20 DIAGNOSIS — L82.1 SK (SEBORRHEIC KERATOSIS): Primary | ICD-10-CM

## 2024-03-20 DIAGNOSIS — L57.0 ACTINIC KERATOSIS: ICD-10-CM

## 2024-03-20 PROCEDURE — 99212 OFFICE O/P EST SF 10 MIN: CPT | Performed by: DERMATOLOGY

## 2024-03-20 PROCEDURE — 11102 TANGNTL BX SKIN SINGLE LES: CPT | Performed by: DERMATOLOGY

## 2024-03-20 PROCEDURE — 17000 DESTRUCT PREMALG LESION: CPT | Performed by: DERMATOLOGY

## 2024-03-20 PROCEDURE — 17003 DESTRUCT PREMALG LES 2-14: CPT | Performed by: DERMATOLOGY

## 2024-03-20 PROCEDURE — 1123F ACP DISCUSS/DSCN MKR DOCD: CPT | Performed by: DERMATOLOGY

## 2024-03-20 NOTE — PATIENT INSTRUCTIONS
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
## 2024-03-25 ENCOUNTER — TELEPHONE (OUTPATIENT)
Dept: DERMATOLOGY | Age: 89
End: 2024-03-25

## 2024-03-25 NOTE — TELEPHONE ENCOUNTER
Pt got his results in [email or web address] and would like to talk to Dr. Perea please call him at [phone]

## 2024-03-26 DIAGNOSIS — C44.41 BASAL CELL CARCINOMA (BCC) OF SCALP: Primary | ICD-10-CM

## 2024-03-31 ENCOUNTER — PATIENT MESSAGE (OUTPATIENT)
Dept: DERMATOLOGY | Age: 89
End: 2024-03-31

## 2024-04-01 NOTE — TELEPHONE ENCOUNTER
Good morning Don,   Please give our office a call for the spot on your back only. We will keep your September appointment for your skin check.   Thanks Rukhsana

## 2024-04-01 NOTE — TELEPHONE ENCOUNTER
From: Tommy Amato  To: Dr. Jun Perea  Sent: 3/31/2024 7:21 PM EDT  Subject: new growth on my back    I have a small, pointy, brown growth on my back which hurts me off and on, but it bothers me. It aches in the muscle around it.  Do you think I should see you before my September checkup?    Thony Amato

## 2024-04-05 ENCOUNTER — PATIENT MESSAGE (OUTPATIENT)
Dept: DERMATOLOGY | Age: 89
End: 2024-04-05

## 2024-04-10 NOTE — TELEPHONE ENCOUNTER
From: Tommy Amato  To: Dr. Jun Perea  Sent: 4/5/2024 3:34 PM EDT  Subject: Mohs surgery    I haven't heard from Dr Santos's office yet as to when she will remove my lesion.    Thony Amato

## 2024-05-13 ENCOUNTER — OFFICE VISIT (OUTPATIENT)
Dept: ORTHOPEDIC SURGERY | Age: 89
End: 2024-05-13
Payer: MEDICARE

## 2024-05-13 DIAGNOSIS — M25.561 ACUTE PAIN OF RIGHT KNEE: ICD-10-CM

## 2024-05-13 DIAGNOSIS — M17.11 OSTEOARTHRITIS OF RIGHT KNEE, UNSPECIFIED OSTEOARTHRITIS TYPE: Primary | ICD-10-CM

## 2024-05-13 PROCEDURE — 99214 OFFICE O/P EST MOD 30 MIN: CPT | Performed by: STUDENT IN AN ORGANIZED HEALTH CARE EDUCATION/TRAINING PROGRAM

## 2024-05-13 PROCEDURE — 1123F ACP DISCUSS/DSCN MKR DOCD: CPT | Performed by: STUDENT IN AN ORGANIZED HEALTH CARE EDUCATION/TRAINING PROGRAM

## 2024-05-13 PROCEDURE — 20610 DRAIN/INJ JOINT/BURSA W/O US: CPT | Performed by: STUDENT IN AN ORGANIZED HEALTH CARE EDUCATION/TRAINING PROGRAM

## 2024-05-13 RX ORDER — LIDOCAINE HYDROCHLORIDE 10 MG/ML
4 INJECTION, SOLUTION INFILTRATION; PERINEURAL ONCE
Status: COMPLETED | OUTPATIENT
Start: 2024-05-13 | End: 2024-05-13

## 2024-05-13 RX ORDER — TRIAMCINOLONE ACETONIDE 40 MG/ML
40 INJECTION, SUSPENSION INTRA-ARTICULAR; INTRAMUSCULAR ONCE
Status: COMPLETED | OUTPATIENT
Start: 2024-05-13 | End: 2024-05-13

## 2024-05-13 RX ADMIN — TRIAMCINOLONE ACETONIDE 40 MG: 40 INJECTION, SUSPENSION INTRA-ARTICULAR; INTRAMUSCULAR at 13:57

## 2024-05-13 RX ADMIN — LIDOCAINE HYDROCHLORIDE 4 ML: 10 INJECTION, SOLUTION INFILTRATION; PERINEURAL at 13:56

## 2024-05-13 NOTE — PROGRESS NOTES
palpation at medial joint line  ROM: 0 - 140 with medial pain in termianl extension   Grossly stable to varus / valgus stress and posterior drawer   No evidence of neurovascular compromise distally  Special tests: None        Imaging  Previous images to the right knee reviewed and show KL grade 4 bone-on-bone contact medial compartment of the right knee.  Other compartments preserved.    Procedure:    Risks benefits limitations and alternatives to right knee injection were discussed with patient who wished to proceed.  Under aseptic technique the below medications were injected into the knee without difficulty.  There was no complications in the patient tolerated the procedure well.    Administrations This Visit       lidocaine 1 % injection 4 mL       Admin Date  05/13/2024  13:56 Action  Given Dose  4 mL Route  Intra-artICUlar Site   Administered By  Nikki Ansari MA    Ordering Provider: Hilton Tolentino MD    NDC: 1973-4168-79    Lot#: [id number]    : HOSPIRA    Patient Supplied?: No              triamcinolone acetonide (KENALOG-40) injection 40 mg       Admin Date  05/13/2024  13:57 Action  Given Dose  40 mg Route  Intra-artICUlar Site   Administered By  Nikki Ansari MA    Ordering Provider: Hilton Tolentino MD    NDC: 2581-5226-34    Lot#: [id number]    : [employer] [place] (PRIMARY CARE)    Patient Supplied?: No                         Orders Placed This Encounter   Procedures    IN ARTHROCENTESIS ASPIR&/INJ MAJOR JT/BURSA W/O US       Assessment and Plan  Tommy was seen today for knee pain.    Diagnoses and all orders for this visit:    Osteoarthritis of right knee, unspecified osteoarthritis type  -     triamcinolone acetonide (KENALOG-40) injection 40 mg  -     lidocaine 1 % injection 4 mL  -     IN ARTHROCENTESIS ASPIR&/INJ MAJOR JT/BURSA W/O US    Acute pain of right knee  -     triamcinolone acetonide (KENALOG-40) injection 40 mg  -     lidocaine 1 % injection 4 mL  -     IN

## 2024-05-23 ENCOUNTER — PROCEDURE VISIT (OUTPATIENT)
Dept: SURGERY | Age: 89
End: 2024-05-23
Payer: MEDICARE

## 2024-05-23 VITALS — DIASTOLIC BLOOD PRESSURE: 82 MMHG | HEART RATE: 78 BPM | SYSTOLIC BLOOD PRESSURE: 155 MMHG

## 2024-05-23 DIAGNOSIS — C44.41 BASAL CELL CARCINOMA (BCC) OF SCALP: Primary | ICD-10-CM

## 2024-05-23 PROCEDURE — 12032 INTMD RPR S/A/T/EXT 2.6-7.5: CPT | Performed by: DERMATOLOGY

## 2024-05-23 PROCEDURE — 17311 MOHS 1 STAGE H/N/HF/G: CPT | Performed by: DERMATOLOGY

## 2024-05-23 NOTE — PROGRESS NOTES
PRE-PROCEDURE SCREENING    Pacemaker/ICD: No  Difficulty with numbing in the past: No  Local Anesthesia Reaction/passing out: No  Latex or adhesive allergy:  No  Any history of reaction to suture or skin glue:  no  Bleeding/Clotting Disorders: No  Anticoagulant Therapy: 81 mg asa s/p DVT  Joint prosthesis: No  Artificial Heart Valve: No  Stroke or Seizures: No  Organ Transplant or Lymphoma: No  Immunosuppression: No  Respiratory Problems: No

## 2024-05-23 NOTE — PROGRESS NOTES
MOHS PROCEDURE NOTE    PHYSICIAN:  Leonarda Santos MD, Who operated in two distinct and integrated capacities as the surgeon removing the tissue and as the pathologist examining the tissue.    ASSISTANT: Silvia Olivas LPN, Prashant Swain RN    REFERRING PROVIDER:   Jun Perea MD    PREOPERATIVE DIAGNOSIS: Nodular Basal Cell Carcinoma, pigmented    SPECIFIC MOHS INDICATIONS:  location and need for tissue conservation    AUC SCORIN/9    POSTOPERATIVE DIAGNOSIS: SAME    LOCATION: Left anterior temporal scalp    OPERATIVE PROCEDURE:  MOHS MICROGRAPHIC SURGERY    RECONSTRUCTION OF DEFECT: Intermediate layered closure    PREOPERATIVE SIZE: 6 x 5 MM    DEFECT SIZE: 10 x 10 MM    LENGTH OF REPAIRED WOUND/SIZE OF FLAP/SIZE OF GRAFT:  28 MM    ANESTHESIA:  5 mL 1% lidocaine with epinephrine 1:100,000 buffered.     EBL:  MINIMAL    DURATION OF PROCEDURE:  1 HOURS    POSTOPERATIVE OBSERVATION: 30 MINUTES    SPECIMENS:  SEE MOHS MAP    COMPLICATIONS:  NONE    DESCRIPTION OF PROCEDURE:  The patient was given a mirror, as appropriate, and the biopsy site was identified, marked with a surgical marking pen, and verified by the patient.   Options for treatment were discussed and the patient was informed that Mohs surgery was the selected treatment based on its lower recurrence rate, given the features listed above, as compared to other treatment modalities such as excision, radiation, or curettage, and agreed with this treatment plan.  Risks and benefits including bruising, swelling, bleeding, infection, nerve injury, recurrence, and scarring were discussed with the patient prior to the procedure and a written consent detailing these and other risks was reviewed with the patient and signed.    There was a time out for person and procedure verification.  The surgical site was prepped with an antiseptic solution.  Application of an antiseptic solution was repeated before each surgical stage.      Stage I:  The

## 2024-05-24 ENCOUNTER — TELEPHONE (OUTPATIENT)
Dept: SURGERY | Age: 89
End: 2024-05-24

## 2024-05-24 NOTE — TELEPHONE ENCOUNTER
The patient was in the office on 5/23/24 for MOHS procedure located on the Left Anterior Temporal Scalp with ILC repair.  The patient tolerated the procedure well and left the office in good condition.    Pain level on post-operative day 1:  present - adequately treated with Tylenol.    Any bleeding episode that required pressure to be held, bandage change or a call to the office or MD?  no     Any other issues?:  no    A post-operative telephone call was placed at 10:29 a.m. in order to check on the patient's recovery process.  The patient reported doing well and had no complaints other than those listed above, if any.  All of the patient's questions were answered.

## 2024-05-27 ENCOUNTER — PATIENT MESSAGE (OUTPATIENT)
Age: 89
End: 2024-05-27

## 2024-05-27 DIAGNOSIS — E78.00 PURE HYPERCHOLESTEROLEMIA: ICD-10-CM

## 2024-05-28 ENCOUNTER — TELEPHONE (OUTPATIENT)
Dept: SURGERY | Age: 89
End: 2024-05-28

## 2024-05-28 RX ORDER — SIMVASTATIN 20 MG
TABLET ORAL
Qty: 90 TABLET | Refills: 3 | OUTPATIENT
Start: 2024-05-28

## 2024-05-28 RX ORDER — SIMVASTATIN 20 MG
20 TABLET ORAL NIGHTLY
Qty: 90 TABLET | Refills: 1 | Status: SHIPPED | OUTPATIENT
Start: 2024-05-28

## 2024-05-28 NOTE — TELEPHONE ENCOUNTER
Requested Prescriptions     Pending Prescriptions Disp Refills    simvastatin (ZOCOR) 20 MG tablet 90 tablet 1     Sig: Take 1 tablet by mouth nightly         Last OV: 2/6/2024    Last labs: 12/13/2023     F/u: 6/4/2024

## 2024-05-28 NOTE — TELEPHONE ENCOUNTER
The patient was in the office on 5.23.24 for MOHS procedure located on the Left Anterior Temporal Scalp  with ILC repair followed by skin adhesive.      A call was placed to patient at 8:55 a.m. in order to address his concern about changing the dressing to the site which was left on [employer], but there was no answer and a voicemail msg was left reminding patient the dressing was able to removed on 5/24/24 and if anything is to be applied, only a dry bandaid.    Office phone # and hours were left if patient needs additional information.

## 2024-05-28 NOTE — TELEPHONE ENCOUNTER
Pt left voice message on [employer] requesting a call regarding taking his bandage off from [facility] on 5/23.  Please call to advise.

## 2024-05-28 NOTE — TELEPHONE ENCOUNTER
From: Tommy Amato  To: Dr. Columba Oviedo  Sent: 5/27/2024 8:59 AM EDT  Subject: Simvastatin 20 mg    Would you please reorder this from [facility] for 90 day refill?  Thanks, Thony Amato

## 2024-06-03 DIAGNOSIS — I10 ESSENTIAL HYPERTENSION, BENIGN: ICD-10-CM

## 2024-06-03 RX ORDER — LISINOPRIL AND HYDROCHLOROTHIAZIDE 12.5; 1 MG/1; MG/1
TABLET ORAL
Qty: 90 TABLET | Refills: 1 | Status: SHIPPED | OUTPATIENT
Start: 2024-06-03

## 2024-06-03 NOTE — TELEPHONE ENCOUNTER
Requested Prescriptions     Pending Prescriptions Disp Refills    lisinopril-hydroCHLOROthiazide (PRINZIDE;ZESTORETIC) 10-12.5 MG per tablet [Pharmacy Med Name: LISINOPRIL/HCTZ TABS 10/12.5MG] 90 tablet 3     Sig: TAKE 1 TABLET DAILY        Last OV: 2/6/24    Last labs: 12/13/23     F/u: 6/4/24

## 2024-06-04 ENCOUNTER — OFFICE VISIT (OUTPATIENT)
Age: 89
End: 2024-06-04
Payer: MEDICARE

## 2024-06-04 VITALS
HEART RATE: 75 BPM | OXYGEN SATURATION: 98 % | SYSTOLIC BLOOD PRESSURE: 130 MMHG | RESPIRATION RATE: 16 BRPM | DIASTOLIC BLOOD PRESSURE: 70 MMHG | WEIGHT: 159.8 LBS | BODY MASS INDEX: 25.79 KG/M2 | TEMPERATURE: 97.7 F

## 2024-06-04 DIAGNOSIS — Z86.73 HISTORY OF STROKE: ICD-10-CM

## 2024-06-04 DIAGNOSIS — R73.02 IMPAIRED GLUCOSE TOLERANCE: ICD-10-CM

## 2024-06-04 DIAGNOSIS — R41.3 MEMORY LOSS: Primary | ICD-10-CM

## 2024-06-04 DIAGNOSIS — E78.00 PURE HYPERCHOLESTEROLEMIA: ICD-10-CM

## 2024-06-04 DIAGNOSIS — S46.812A TRAPEZIUS MUSCLE STRAIN, LEFT, INITIAL ENCOUNTER: ICD-10-CM

## 2024-06-04 PROBLEM — I63.9 CEREBELLAR INFARCT (HCC): Status: RESOLVED | Noted: 2024-01-15 | Resolved: 2024-06-04

## 2024-06-04 PROBLEM — S41.111A ISTAP TYPE 3 SKIN TEAR OF RIGHT UPPER ARM: Status: RESOLVED | Noted: 2024-02-12 | Resolved: 2024-06-04

## 2024-06-04 PROCEDURE — 99214 OFFICE O/P EST MOD 30 MIN: CPT | Performed by: FAMILY MEDICINE

## 2024-06-04 PROCEDURE — 1123F ACP DISCUSS/DSCN MKR DOCD: CPT | Performed by: FAMILY MEDICINE

## 2024-06-04 PROCEDURE — G2211 COMPLEX E/M VISIT ADD ON: HCPCS | Performed by: FAMILY MEDICINE

## 2024-06-04 NOTE — PROGRESS NOTES
overlap one corner, and ask the patient to copy.  __1__/1    Score:  __30__/30    In patients over 65 years, a score of 24 or better is normal.       CLOSE YOUR EYES      Outpatient Medications Marked as Taking for the 6/4/24 encounter (Office Visit) with Columba Oviedo MD   Medication Sig Dispense Refill    lisinopril-hydroCHLOROthiazide (PRINZIDE;ZESTORETIC) 10-12.5 MG per tablet TAKE 1 TABLET DAILY 90 tablet 1    simvastatin (ZOCOR) 20 MG tablet Take 1 tablet by mouth nightly 90 tablet 1    mirabegron (MYRBETRIQ) 50 MG TB24 TAKE 1 TABLET DAILY 90 tablet 3    aspirin 81 MG EC tablet Take 1 tablet by mouth daily 90 tablet 1    diclofenac sodium (VOLTAREN) 1 % GEL Apply 4 g topically 2 times daily      clobetasol (TEMOVATE) 0.05 % cream       Polyvinyl Alcohol-Povidone PF (REFRESH) 1.4-0.6 % SOLN ophthalmic solution Place 1 drop into both eyes as needed      linaclotide (LINZESS) 145 MCG capsule Take 1 capsule by mouth every morning (before breakfast) 90 capsule 1    Multiple Vitamins-Minerals (CENTRUM SILVER 50+MEN PO) Take 1 tablet by mouth      vitamin B-12 (CYANOCOBALAMIN) 1000 MCG tablet Take 1 tablet by mouth daily      BIOTIN 5000 PO Take by mouth          Allergies   Allergen Reactions    Percodan [Oxycodone-Aspirin] Nausea Only       Patient Active Problem List   Diagnosis    Overactive bladder    Pure hypercholesterolemia    Lichen planus    Essential hypertension, benign    Dry mouth    Benign non-nodular prostatic hyperplasia with lower urinary tract symptoms    Eczema, dyshidrotic    Chronic idiopathic constipation    Primary osteoarthritis of right knee    Impaired glucose tolerance    Cerebellar infarct (HCC)       Past Medical History:   Diagnosis Date    Angioma 08/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

## 2024-06-07 DIAGNOSIS — E78.00 PURE HYPERCHOLESTEROLEMIA: ICD-10-CM

## 2024-06-07 DIAGNOSIS — R73.02 IMPAIRED GLUCOSE TOLERANCE: ICD-10-CM

## 2024-06-07 DIAGNOSIS — R41.3 MEMORY LOSS: ICD-10-CM

## 2024-06-08 LAB
ALBUMIN SERPL-MCNC: 4 G/DL (ref 3.4–5)
ALBUMIN/GLOB SERPL: 1.7 {RATIO} (ref 1.1–2.2)
ALP SERPL-CCNC: 56 U/L (ref 40–129)
ALT SERPL-CCNC: 10 U/L (ref 10–40)
ANION GAP SERPL CALCULATED.3IONS-SCNC: 13 MMOL/L (ref 3–16)
AST SERPL-CCNC: 19 U/L (ref 15–37)
BILIRUB SERPL-MCNC: 0.6 MG/DL (ref 0–1)
BUN SERPL-MCNC: 28 MG/DL (ref 7–20)
CALCIUM SERPL-MCNC: 9.6 MG/DL (ref 8.3–10.6)
CHLORIDE SERPL-SCNC: 101 MMOL/L (ref 99–110)
CHOLEST SERPL-MCNC: 160 MG/DL (ref 0–199)
CO2 SERPL-SCNC: 25 MMOL/L (ref 21–32)
CREAT SERPL-MCNC: 0.9 MG/DL (ref 0.8–1.3)
EST. AVERAGE GLUCOSE BLD GHB EST-MCNC: 116.9 MG/DL
FOLATE SERPL-MCNC: >20 NG/ML (ref 4.78–24.2)
GFR SERPLBLD CREATININE-BSD FMLA CKD-EPI: 82 ML/MIN/{1.73_M2}
GLUCOSE SERPL-MCNC: 79 MG/DL (ref 70–99)
HBA1C MFR BLD: 5.7 %
HDLC SERPL-MCNC: 74 MG/DL (ref 40–60)
LDLC SERPL CALC-MCNC: 74 MG/DL
POTASSIUM SERPL-SCNC: 4.6 MMOL/L (ref 3.5–5.1)
PROT SERPL-MCNC: 6.3 G/DL (ref 6.4–8.2)
SODIUM SERPL-SCNC: 139 MMOL/L (ref 136–145)
TRIGL SERPL-MCNC: 58 MG/DL (ref 0–150)
TSH SERPL DL<=0.005 MIU/L-ACNC: 1.65 UIU/ML (ref 0.27–4.2)
VIT B12 SERPL-MCNC: 865 PG/ML (ref 211–911)
VLDLC SERPL CALC-MCNC: 12 MG/DL

## 2024-07-02 ENCOUNTER — NURSE ONLY (OUTPATIENT)
Age: 89
End: 2024-07-02

## 2024-07-02 DIAGNOSIS — Z78.9 PARTICIPANT IN HEALTH AND WELLNESS PLAN: Primary | ICD-10-CM

## 2024-07-02 NOTE — PROGRESS NOTES
Results indicate mild to moderate impairment in balance, and would benefit from structured PT plan for balance progression.

## 2024-07-17 ENCOUNTER — TELEPHONE (OUTPATIENT)
Dept: ORTHOPEDIC SURGERY | Age: 89
End: 2024-07-17

## 2024-07-22 ENCOUNTER — OFFICE VISIT (OUTPATIENT)
Dept: ORTHOPEDIC SURGERY | Age: 89
End: 2024-07-22

## 2024-07-22 VITALS — BODY MASS INDEX: 25.55 KG/M2 | HEIGHT: 66 IN | WEIGHT: 159 LBS

## 2024-07-22 DIAGNOSIS — M25.512 PERISCAPULAR PAIN OF LEFT SHOULDER: ICD-10-CM

## 2024-07-22 DIAGNOSIS — M25.512 LEFT SHOULDER PAIN, UNSPECIFIED CHRONICITY: Primary | ICD-10-CM

## 2024-07-22 DIAGNOSIS — M75.82 TENDINITIS OF LEFT ROTATOR CUFF: ICD-10-CM

## 2024-07-22 NOTE — PROGRESS NOTES
[facility]  History and Physical  Shoulder Pain    Date:  2024    Name:  Tommy Amato  Address:  [address] [place] [address] [id number]  [facility] [address]    :  1935      Age:   88 y.o.    SSN:  [id number]      Medical Record Number:  [id number]    Reason for Visit:    Shoulder Pain (LEFT SHOULDER)      HPI:   Tommy Amato is a 88 y.o. male who presents to our office today for follow up of the left shoulder pain. He has not been seen in our office since 2023.  He was treated conservatively for a small rotator cuff tear and went on to do well.    Today he reports the left shoulder has been pain painful and achy.  He reports he has pain mostly on the posterior aspect of his left shoulder.  He denies having weakness and denies any injuries.  He reports he remains very active and staying physically fit.        Pain Assessment  Location of Pain: Shoulder  Location Modifiers: Left  Severity of Pain: 2  Quality of Pain: Sharp, Aching  Frequency of Pain: Intermittent  Aggravating Factors: Other (Comment), Stretching, Straightening  Limiting Behavior: Yes  Relieving Factors: Rest, Other (Comment), Ice  Work-Related Injury: No  Are there other pain locations you wish to document?: No    No notes on file    Review of Systems:  A 14 point review of systems available in the scanned medical record as documented by the patient and reviewed on 2024.  The review is negative with the exception of those things mentioned in the History of Present Illness and Past Medical History.      Past Medical History:  Patient's medications, allergies, past medical, surgical, social and family histories were reviewed and updated as appropriate.    Allergies:  Allergies   Allergen Reactions    Percodan [Oxycodone-Aspirin] Nausea Only       Physical Exam:  There were no vitals filed for this visit.  General: Tommy Amato is a healthy and well appearing 88 y.o. male who is sitting

## 2024-07-23 ENCOUNTER — TELEPHONE (OUTPATIENT)
Dept: ORTHOPEDIC SURGERY | Age: 89
End: 2024-07-23

## 2024-07-23 DIAGNOSIS — M25.512 LEFT SHOULDER PAIN, UNSPECIFIED CHRONICITY: Primary | ICD-10-CM

## 2024-07-23 NOTE — TELEPHONE ENCOUNTER
General Question     Subject: PHYSICAL THERAPY ORDER   Patient and /or Facility Request: Robina Amato   Contact Number: [phone]       PT CALLING IN REGARDING NEEDING A REF SENT TO [facility] FOR HIS LT SHOULDER.     PT IS SCHED FOR PHYSICAL THERAPY ON MONDAY    PLEASE CALL BACK PT

## 2024-07-29 ENCOUNTER — HOSPITAL ENCOUNTER (OUTPATIENT)
Dept: PHYSICAL THERAPY | Age: 89
Setting detail: THERAPIES SERIES
Discharge: HOME | End: 2024-07-29
Payer: MEDICARE

## 2024-07-29 DIAGNOSIS — R29.3 BAD POSTURE: ICD-10-CM

## 2024-07-29 DIAGNOSIS — R53.1 DECREASED STRENGTH: ICD-10-CM

## 2024-07-29 DIAGNOSIS — R52 INCREASED PAIN: Primary | ICD-10-CM

## 2024-07-29 DIAGNOSIS — R29.898 DECREASED ROM OF NECK: ICD-10-CM

## 2024-07-29 PROCEDURE — 97110 THERAPEUTIC EXERCISES: CPT | Performed by: PHYSICAL THERAPIST

## 2024-07-29 PROCEDURE — 97161 PT EVAL LOW COMPLEX 20 MIN: CPT | Performed by: PHYSICAL THERAPIST

## 2024-07-29 NOTE — THERAPY EVALUATION
Met: [] Adjusted  4. Patient will return to *** without increased symptoms or restriction.   [] Progressing: [] Met: [] Not Met: [] Adjusted  5. ***(patient specific functional goal)    [] Progressing: [] Met: [] Not Met: [] Adjusted     Overall Progression Towards Functional goals/ Treatment Progress Update:  [] Patient is progressing as expected towards functional goals listed.    [] Progression is slowed due to complexities/Impairments listed.  [] Progression has been slowed due to co-morbidities.  [x] Plan just implemented, too soon (<30days) to assess goals progression   [] Goals require adjustment due to lack of progress  [] Patient is not progressing as expected and requires additional follow up with physician  [] Other:     TREATMENT PLAN     Frequency/Duration: {POC Frequecy:48274}/week for {weeks:97416} weeks for the following treatment interventions:    Interventions:  {PT INTERVENTIONS:86413}    Plan: {gixx5vzkq:91164::\"POC initiated as per evaluation\"}    Electronically Signed by Iraida Werner, PT  Date: 07/29/2024     Note: Portions of this note have been templated and/or copied from initial evaluation, reassessments and prior notes for documentation efficiency.    Note: If patient does not return for scheduled/recommended follow up visits, this note will serve as a discharge from care along with the most recent update on progress.    Ortho Evaluation

## 2024-08-07 ENCOUNTER — HOSPITAL ENCOUNTER (OUTPATIENT)
Dept: PHYSICAL THERAPY | Age: 89
Setting detail: THERAPIES SERIES
Discharge: HOME | End: 2024-08-07
Payer: MEDICARE

## 2024-08-07 PROCEDURE — 97110 THERAPEUTIC EXERCISES: CPT

## 2024-08-07 PROCEDURE — 97140 MANUAL THERAPY 1/> REGIONS: CPT

## 2024-08-07 NOTE — FLOWSHEET NOTE
uncomplicated characteristics   [x] Clinical decision making of LOW (26579 - Typically 20 minutes face-to-face) complexity using standardized patient assessment instrument and/or measurable assessment of functional outcome.    Today's Assessment: Unable to reproduce or trigger patient's sxs with cervical or shoulder ROM. Patient was able to complete all exercises without issue. Recommended patient write down what activity/position he is doing when he experiences pain to determine triggers.  See above    Medical Necessity Documentation:  I certify that this patient meets the below criteria necessary for medical necessity for care and/or justification of therapy services:  The patient has a musculoskeletal condition(s) with a corresponding ICD-10 code that is of complexity and severity that require skilled therapeutic intervention. This has a direct and significant impact on the need for therapy and significantly impacts the rate of recovery.     Return to Play: NA    Prognosis for POC: [x] Good [] Fair  [] Poor    Patient requires continued skilled intervention: [x] Yes  [] No      CHARGE CAPTURE     PT CHARGE GRID   CPT Code (TIMED) minutes # CPT Code (UNTIMED) #     Therex (86304)  30 2  EVAL:LOW (57431 - Typically 20 minutes face-to-face)     Neuromusc. Re-ed (82303)    Re-Eval (61398)     Manual (04443) 10 1  Estim Unattended (85225)     Ther. Act (31284)    Mech. Traction (66186)     Gait (21413)    Dry Needle 1-2 muscle (20560)     Aquatic Therex (93916)    Dry Needle 3+ muscle (20561)     Iontophoresis (96018)    VASO (99312)     Ultrasound (76828)    Group Therapy (89657)     Estim Attended (09617)    Canalith Repositioning (55740)     Other:    Other:    Total Timed Code Tx Minutes 40 3       Total Treatment Minutes 50        Charge Justification:  (92121) THERAPEUTIC EXERCISE - Provided verbal/tactile cueing for activities related to strengthening, flexibility, endurance, ROM performed to prevent loss of

## 2024-08-15 ENCOUNTER — APPOINTMENT (OUTPATIENT)
Dept: PHYSICAL THERAPY | Age: 89
End: 2024-08-15
Payer: MEDICARE

## 2024-09-05 RX ORDER — MIRABEGRON 50 MG/1
TABLET, EXTENDED RELEASE ORAL
Qty: 90 TABLET | Refills: 3 | Status: SHIPPED | OUTPATIENT
Start: 2024-09-05

## 2024-09-05 NOTE — TELEPHONE ENCOUNTER
Requested Prescriptions     Pending Prescriptions Disp Refills    mirabegron (MYRBETRIQ) 50 MG TB24 90 tablet 3     Sig: TAKE 1 TABLET DAILY         Last OV: 6/4/2024    Last labs: 6/7/2024     F/u: 12/23/2024

## 2024-09-17 RX ORDER — MIRABEGRON 50 MG/1
TABLET, EXTENDED RELEASE ORAL
Qty: 90 TABLET | Refills: 3 | Status: SHIPPED | OUTPATIENT
Start: 2024-09-17

## 2024-09-19 ENCOUNTER — OFFICE VISIT (OUTPATIENT)
Dept: DERMATOLOGY | Age: 89
End: 2024-09-19

## 2024-09-19 DIAGNOSIS — R23.8 VENOUS LAKE OF LIP: ICD-10-CM

## 2024-09-19 DIAGNOSIS — D48.5 NEOPLASM OF UNCERTAIN BEHAVIOR OF SKIN: Primary | ICD-10-CM

## 2024-09-19 DIAGNOSIS — L57.0 ACTINIC KERATOSIS: ICD-10-CM

## 2024-09-23 ENCOUNTER — OFFICE VISIT (OUTPATIENT)
Dept: ORTHOPEDIC SURGERY | Age: 89
End: 2024-09-23
Payer: MEDICARE

## 2024-09-23 VITALS — BODY MASS INDEX: 25.55 KG/M2 | HEIGHT: 66 IN | WEIGHT: 159 LBS

## 2024-09-23 DIAGNOSIS — M17.11 OSTEOARTHRITIS OF RIGHT KNEE, UNSPECIFIED OSTEOARTHRITIS TYPE: Primary | ICD-10-CM

## 2024-09-23 PROCEDURE — 99024 POSTOP FOLLOW-UP VISIT: CPT | Performed by: STUDENT IN AN ORGANIZED HEALTH CARE EDUCATION/TRAINING PROGRAM

## 2024-09-23 PROCEDURE — 20610 DRAIN/INJ JOINT/BURSA W/O US: CPT | Performed by: STUDENT IN AN ORGANIZED HEALTH CARE EDUCATION/TRAINING PROGRAM

## 2024-09-23 RX ORDER — LIDOCAINE HYDROCHLORIDE 10 MG/ML
4 INJECTION, SOLUTION INFILTRATION; PERINEURAL ONCE
Status: COMPLETED | OUTPATIENT
Start: 2024-09-23 | End: 2024-09-23

## 2024-09-23 RX ORDER — TRIAMCINOLONE ACETONIDE 40 MG/ML
40 INJECTION, SUSPENSION INTRA-ARTICULAR; INTRAMUSCULAR ONCE
Status: COMPLETED | OUTPATIENT
Start: 2024-09-23 | End: 2024-09-23

## 2024-09-23 RX ADMIN — TRIAMCINOLONE ACETONIDE 40 MG: 40 INJECTION, SUSPENSION INTRA-ARTICULAR; INTRAMUSCULAR at 11:48

## 2024-09-23 RX ADMIN — LIDOCAINE HYDROCHLORIDE 4 ML: 10 INJECTION, SOLUTION INFILTRATION; PERINEURAL at 11:47

## 2024-09-25 DIAGNOSIS — C44.41 BASAL CELL CARCINOMA (BCC) OF SCALP: Primary | ICD-10-CM

## 2024-10-26 DIAGNOSIS — I10 ESSENTIAL HYPERTENSION, BENIGN: ICD-10-CM

## 2024-10-26 DIAGNOSIS — E78.00 PURE HYPERCHOLESTEROLEMIA: ICD-10-CM

## 2024-10-27 DIAGNOSIS — E78.00 PURE HYPERCHOLESTEROLEMIA: ICD-10-CM

## 2024-10-28 RX ORDER — LISINOPRIL AND HYDROCHLOROTHIAZIDE 10; 12.5 MG/1; MG/1
1 TABLET ORAL DAILY
Qty: 90 TABLET | Refills: 1 | Status: SHIPPED | OUTPATIENT
Start: 2024-10-28

## 2024-10-28 RX ORDER — SIMVASTATIN 20 MG
20 TABLET ORAL NIGHTLY
Qty: 90 TABLET | Refills: 2 | Status: SHIPPED | OUTPATIENT
Start: 2024-10-28 | End: 2024-10-28

## 2024-10-28 RX ORDER — SIMVASTATIN 20 MG
20 TABLET ORAL NIGHTLY
Qty: 90 TABLET | Refills: 1 | Status: SHIPPED | OUTPATIENT
Start: 2024-10-28

## 2024-10-28 NOTE — TELEPHONE ENCOUNTER
Requested Prescriptions     Pending Prescriptions Disp Refills    simvastatin (ZOCOR) 20 MG tablet [Pharmacy Med Name: SIMVASTATIN TABS 20MG]  0    lisinopril-hydroCHLOROthiazide (PRINZIDE;ZESTORETIC) 10-12.5 MG per tablet [Pharmacy Med Name: LISINOPRIL/HYDROCHLOROTHIAZIDE TABS 10/12.5MG]  0         Last OV: 6/4/2024    Last labs: 6/7/2024     F/u: 10/27/2024

## 2024-10-28 NOTE — TELEPHONE ENCOUNTER
Requested Prescriptions     Pending Prescriptions Disp Refills    simvastatin (ZOCOR) 20 MG tablet 90 tablet 1     Sig: Take 1 tablet by mouth nightly         Last OV: 6/4/2024    Last labs: 6/7/2024     F/u: 12/23/2024

## 2024-11-12 DIAGNOSIS — E78.00 PURE HYPERCHOLESTEROLEMIA: Primary | ICD-10-CM

## 2024-11-12 DIAGNOSIS — R73.02 IMPAIRED GLUCOSE TOLERANCE: ICD-10-CM

## 2024-11-12 DIAGNOSIS — Z86.73 HISTORY OF STROKE: ICD-10-CM

## 2024-11-18 ENCOUNTER — TELEPHONE (OUTPATIENT)
Age: 89
End: 2024-11-18

## 2024-11-18 NOTE — PROGRESS NOTES
Subjective:      Patient ID: Tommy Amato 89 y.o. male. The primary encounter diagnosis was Contusion of right knee, initial encounter. Diagnoses of Gait apraxia and Fall in home, initial encounter were also pertinent to this visit.      HPI    Thony and Robina toured an independent living facility on 11/15/24. Thony felt exhausted when he got home; laid done for a nap.  Later fell in the BR; struck his right knee.  Robina was able to get him to his feet but he immediately fell again.  Struck his head; no LOC.  He had a 3rd fall that day, he says because the right knee hurt from the previous fall and buckled out from under him.  He was taken to the ER and evaluated; CT head showed no acute changes.  Xray right knee showed OA and small effusion.  No fracture.  He was discharged to home.  Home BP \"normal\" - 135-140/.  He admits to feeling light headed most of the time when he stands up.  Does not always drink enough fluids.  He notes he has always walked funny - waddles.    Right knee aches when he walks.  Tender to touch.    Rx: Advil 600 mg once a day helps a little.  Ice helps.     Labs and xray reports reviewed on [facility].     Outpatient Medications Marked as Taking for the 11/20/24 encounter (Office Visit) with Columba Oviedo MD   Medication Sig Dispense Refill    simvastatin (ZOCOR) 20 MG tablet Take 1 tablet by mouth nightly 90 tablet 1    lisinopril-hydroCHLOROthiazide (PRINZIDE;ZESTORETIC) 10-12.5 MG per tablet Take 1 tablet by mouth daily 90 tablet 1    mirabegron (MYRBETRIQ) 50 MG TB24 TAKE 1 TABLET DAILY 90 tablet 3    aspirin 81 MG EC tablet Take 1 tablet by mouth daily 90 tablet 1    diclofenac sodium (VOLTAREN) 1 % GEL Apply 4 g topically 2 times daily      clobetasol (TEMOVATE) 0.05 % cream       Polyvinyl Alcohol-Povidone PF (REFRESH) 1.4-0.6 % SOLN ophthalmic solution Place 1 drop into both eyes as needed      linaclotide (LINZESS) 145 MCG capsule Take 1 capsule by mouth every morning

## 2024-11-18 NOTE — TELEPHONE ENCOUNTER
From pts wife radha:    Don fell three times in our apartment yesterday and I had to call 911 because I couldn’t get them up the last time  EMTs took him to [facility] emergency room and all his tests were normal  We think he was I was dehydrated because we did a lot of walking yesterday

## 2024-11-20 ENCOUNTER — OFFICE VISIT (OUTPATIENT)
Age: 89
End: 2024-11-20

## 2024-11-20 VITALS
SYSTOLIC BLOOD PRESSURE: 98 MMHG | OXYGEN SATURATION: 97 % | HEART RATE: 65 BPM | WEIGHT: 153.6 LBS | DIASTOLIC BLOOD PRESSURE: 56 MMHG | TEMPERATURE: 97.3 F | BODY MASS INDEX: 24.79 KG/M2 | RESPIRATION RATE: 16 BRPM

## 2024-11-20 DIAGNOSIS — R48.2 GAIT APRAXIA: ICD-10-CM

## 2024-11-20 DIAGNOSIS — I95.2 HYPOTENSION DUE TO DRUGS: ICD-10-CM
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
## 2024-11-20 DIAGNOSIS — S80.01XA CONTUSION OF RIGHT KNEE, INITIAL ENCOUNTER: Primary | ICD-10-CM
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
## 2024-11-20 DIAGNOSIS — S81.811A SKIN TEAR OF RIGHT LOWER LEG WITHOUT COMPLICATION, INITIAL ENCOUNTER: ICD-10-CM

## 2024-11-20 RX ORDER — ACETAMINOPHEN 500 MG
1000 TABLET ORAL 3 TIMES DAILY
COMMUNITY
Start: 2024-11-20

## 2024-11-20 RX ORDER — LISINOPRIL 10 MG/1
10 TABLET ORAL DAILY
Qty: 90 TABLET | Refills: 1 | Status: SHIPPED | OUTPATIENT
Start: 2024-11-20

## 2024-11-20 SDOH — ECONOMIC STABILITY: FOOD INSECURITY: WITHIN THE PAST 12 MONTHS, THE FOOD YOU BOUGHT JUST DIDN'T LAST AND YOU DIDN'T HAVE MONEY TO GET MORE.: NEVER TRUE

## 2024-11-20 SDOH — ECONOMIC STABILITY: FOOD INSECURITY: WITHIN THE PAST 12 MONTHS, YOU WORRIED THAT YOUR FOOD WOULD RUN OUT BEFORE YOU GOT MONEY TO BUY MORE.: NEVER TRUE

## 2024-11-20 SDOH — ECONOMIC STABILITY: INCOME INSECURITY: HOW HARD IS IT FOR YOU TO PAY FOR THE VERY BASICS LIKE FOOD, HOUSING, MEDICAL CARE, AND HEATING?: NOT HARD AT ALL

## 2024-11-25 ENCOUNTER — PATIENT MESSAGE (OUTPATIENT)
Age: 88
End: 2024-11-25

## 2024-11-25 ENCOUNTER — TELEPHONE (OUTPATIENT)
Age: 88
End: 2024-11-25

## 2024-11-25 DIAGNOSIS — S80.01XA CONTUSION OF RIGHT KNEE, INITIAL ENCOUNTER: ICD-10-CM
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
## 2024-11-25 DIAGNOSIS — R48.2 GAIT APRAXIA: Primary | ICD-10-CM

## 2024-11-25 DIAGNOSIS — E78.00 PURE HYPERCHOLESTEROLEMIA: ICD-10-CM

## 2024-11-25 NOTE — TELEPHONE ENCOUNTER
[facility] for emergency respite care stay. Will be starting off in assisted living and then the goal will be transitioning to independent living. Will need paperwork submitted by Wednesday 3 pm. Will need H&P and printed rx for each medication they are taking. Per Guadalupe cortés for any office visit within the last 30 days.     Will be faxing over paperwork here shortly.

## 2024-11-26 ENCOUNTER — TELEPHONE (OUTPATIENT)
Age: 88
End: 2024-11-26

## 2024-11-26 DIAGNOSIS — E78.00 PURE HYPERCHOLESTEROLEMIA: Primary | ICD-10-CM

## 2024-11-26 DIAGNOSIS — R48.2 GAIT APRAXIA: ICD-10-CM

## 2024-11-26 RX ORDER — MIRABEGRON 50 MG/1
TABLET, FILM COATED, EXTENDED RELEASE ORAL
Qty: 90 TABLET | Refills: 3 | Status: SHIPPED | OUTPATIENT
Start: 2024-11-26

## 2024-11-26 RX ORDER — ASPIRIN 81 MG/1
81 TABLET ORAL DAILY
Qty: 90 TABLET | Refills: 1 | Status: SHIPPED | OUTPATIENT
Start: 2024-11-26

## 2024-11-26 RX ORDER — SIMVASTATIN 20 MG
20 TABLET ORAL NIGHTLY
Qty: 90 TABLET | Refills: 1 | Status: SHIPPED | OUTPATIENT
Start: 2024-11-26

## 2024-11-26 RX ORDER — ACETAMINOPHEN 500 MG
1000 TABLET ORAL 3 TIMES DAILY
Qty: 120 TABLET | Refills: 2 | Status: SHIPPED | OUTPATIENT
Start: 2024-11-26

## 2024-11-26 RX ORDER — LANOLIN ALCOHOL/MO/W.PET/CERES
1000 CREAM (GRAM) TOPICAL DAILY
Qty: 90 TABLET | Refills: 1 | Status: SHIPPED | OUTPATIENT
Start: 2024-11-26

## 2024-11-26 RX ORDER — LISINOPRIL 10 MG/1
10 TABLET ORAL DAILY
Qty: 90 TABLET | Refills: 1 | Status: SHIPPED | OUTPATIENT
Start: 2024-11-26 | End: 2024-11-26 | Stop reason: SDUPTHER

## 2024-11-26 NOTE — TELEPHONE ENCOUNTER
Patients wife called in     Stated [facility] location for Physical Therapy is to far     Can the referral be switched to Physical Therapy at location 3896 please

## 2024-11-26 NOTE — TELEPHONE ENCOUNTER
Requested Prescriptions     Pending Prescriptions Disp Refills    lisinopril (PRINIVIL;ZESTRIL) 10 MG tablet 90 tablet 1     Sig: Take 1 tablet by mouth daily         Last OV: 11/20/2024    Last labs: 6/7/2024     F/u: 12/23/2024

## 2024-11-27 ENCOUNTER — TELEPHONE (OUTPATIENT)
Age: 88
End: 2024-11-27

## 2024-11-27 DIAGNOSIS — Z91.81 AT HIGH RISK FOR FALLS: ICD-10-CM

## 2024-11-27 DIAGNOSIS — R41.3 MEMORY LOSS: ICD-10-CM

## 2024-11-27 DIAGNOSIS — R48.2 GAIT APRAXIA: Primary | ICD-10-CM

## 2024-11-27 DIAGNOSIS — Z86.73 HISTORY OF STROKE: ICD-10-CM

## 2024-11-27 RX ORDER — LISINOPRIL 10 MG/1
10 TABLET ORAL DAILY
Qty: 90 TABLET | Refills: 1 | Status: SHIPPED | OUTPATIENT
Start: 2024-11-27

## 2024-11-27 NOTE — TELEPHONE ENCOUNTER
Spoke with Guadalupe at the [facility], Don will be moving in on Friday. Will need orders faxed over for PT OT Speech for full eval, extenal referral for pt in system pended OT/Speech

## 2024-11-27 NOTE — TELEPHONE ENCOUNTER
Patients wife called in     Thony is going into Respite Care on Friday     Robina stated Thony needs an order to Physical therapy for balance

## 2024-12-11 ENCOUNTER — HOSPITAL ENCOUNTER (OUTPATIENT)
Dept: PHYSICAL THERAPY | Age: 88
Setting detail: THERAPIES SERIES
Discharge: HOME | End: 2024-12-11
Payer: MEDICARE

## 2024-12-11 PROCEDURE — 97162 PT EVAL MOD COMPLEX 30 MIN: CPT

## 2024-12-11 PROCEDURE — 97110 THERAPEUTIC EXERCISES: CPT

## 2024-12-11 NOTE — PLAN OF CARE
[facility] [address], [address], [place] [id number] office: [phone] fax: [phone]     Physical Therapy Initial Evaluation Certification      Dear Columba Oviedo MD,    We had the pleasure of evaluating the following patient for physical therapy services at [facility].  A summary of our findings can be found in the initial assessment below.  This includes our plan of care.  If you have any questions or concerns regarding these findings, please do not hesitate to contact me at the office phone number listed above.  Thank you for the referral.     Physician Signature:_______________________________Date:__________________  By signing above (or electronic signature), therapist’s plan is approved by physician       Physical Therapy: TREATMENT/PROGRESS NOTE   Patient: Tommy Amato (89 y.o. 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

## 2024-12-17 ENCOUNTER — APPOINTMENT (OUTPATIENT)
Dept: PHYSICAL THERAPY | Age: 88
End: 2024-12-17
Payer: MEDICARE

## 2024-12-18 ENCOUNTER — OFFICE VISIT (OUTPATIENT)
Dept: ORTHOPEDIC SURGERY | Age: 88
End: 2024-12-18

## 2024-12-18 VITALS — HEIGHT: 66 IN | WEIGHT: 153 LBS | BODY MASS INDEX: 24.59 KG/M2

## 2024-12-18 DIAGNOSIS — M17.11 OSTEOARTHRITIS OF RIGHT KNEE, UNSPECIFIED OSTEOARTHRITIS TYPE: Primary | ICD-10-CM

## 2024-12-18 RX ORDER — LIDOCAINE HYDROCHLORIDE 10 MG/ML
4 INJECTION, SOLUTION INFILTRATION; PERINEURAL ONCE
Status: CANCELLED | OUTPATIENT
Start: 2024-12-18 | End: 2024-12-18

## 2024-12-18 RX ORDER — TRIAMCINOLONE ACETONIDE 40 MG/ML
40 INJECTION, SUSPENSION INTRA-ARTICULAR; INTRAMUSCULAR ONCE
Status: CANCELLED | OUTPATIENT
Start: 2024-12-18 | End: 2024-12-18

## 2024-12-18 NOTE — PROGRESS NOTES
History: 89-year-old male known to me for conservative management of right knee arthritis presents for follow-up.  He is unfortunately had a couple falls recently which are not necessarily related to the knee.  After 1 of these falls he has developed significantly increased swelling to the right knee.  He has not had any significant functional deficits otherwise.  His medial arthritis pain continues to be relatively well-controlled.    Exam: There is a large superficial fluid collection to the right knee.  The overlying skin is within normal limits.  This appears to be subcutaneous rather than intra-articular.  He has preserved full active knee extension.  He does not appear to have a defect over his quadriceps tendon but difficult to say if he may have a defect over his medial quad.  No ligamentous instability or neurovascular compromise apparent.    Imaging: 3 views of the right knee ordered obtained interpreted reviewed show KL 4 bone-on-bone arthritis largely unchanged from prior examination.  There is no fractures identified.  Patella is in appropriate position.    Procedure: Risk benefits imitations alternatives of aspiration of the right knee fluid collection were discussed with the patient was in agreement to proceed.  The right knee area was prepared sterilely.  A 20-gauge needle was used to aspirate 30 cc of serosanguineous fluid from the subcutaneous, prepatellar area.  Patient tolerated without complication.    Assessment: 89-year-old male with acute knee swelling following a fall.  Severe and persistent despite conservative management.    Plan: Would recommend MRI of the right knee for further evaluation.  It seems like his quadriceps is functionally intact but may have partially torn this or sustained another internal injury.  We drained the fluid collection today which had about 30 cc of serosanguineous fluid. Would otherwise attribute this to a trauamtic pre patellar bursitis        Hilton Tolentino

## 2024-12-19 ENCOUNTER — TELEPHONE (OUTPATIENT)
Dept: ORTHOPEDIC SURGERY | Age: 88
End: 2024-12-19

## 2024-12-19 DIAGNOSIS — R73.02 IMPAIRED GLUCOSE TOLERANCE: ICD-10-CM

## 2024-12-19 DIAGNOSIS — Z86.73 HISTORY OF STROKE: ICD-10-CM

## 2024-12-19 DIAGNOSIS — E78.00 PURE HYPERCHOLESTEROLEMIA: ICD-10-CM

## 2024-12-19 LAB
ALBUMIN SERPL-MCNC: 4.1 G/DL (ref 3.4–5)
ALBUMIN/GLOB SERPL: 1.7 {RATIO} (ref 1.1–2.2)
ALP SERPL-CCNC: 63 U/L (ref 40–129)
ALT SERPL-CCNC: 11 U/L (ref 10–40)
ANION GAP SERPL CALCULATED.3IONS-SCNC: 8 MMOL/L (ref 3–16)
AST SERPL-CCNC: 16 U/L (ref 15–37)
BILIRUB SERPL-MCNC: 0.5 MG/DL (ref 0–1)
BUN SERPL-MCNC: 14 MG/DL (ref 7–20)
CALCIUM SERPL-MCNC: 9.8 MG/DL (ref 8.3–10.6)
CHLORIDE SERPL-SCNC: 105 MMOL/L (ref 99–110)
CHOLEST SERPL-MCNC: 143 MG/DL (ref 0–199)
CO2 SERPL-SCNC: 26 MMOL/L (ref 21–32)
CREAT SERPL-MCNC: 0.9 MG/DL (ref 0.8–1.3)
DEPRECATED RDW RBC AUTO: 15.2 % (ref 12.4–15.4)
GFR SERPLBLD CREATININE-BSD FMLA CKD-EPI: 82 ML/MIN/{1.73_M2}
GLUCOSE SERPL-MCNC: 93 MG/DL (ref 70–99)
HCT VFR BLD AUTO: 35.4 % (ref 40.5–52.5)
HDLC SERPL-MCNC: 67 MG/DL (ref 40–60)
HGB BLD-MCNC: 11.6 G/DL (ref 13.5–17.5)
LDLC SERPL CALC-MCNC: 62 MG/DL
MCH RBC QN AUTO: 32.4 PG (ref 26–34)
MCHC RBC AUTO-ENTMCNC: 32.8 G/DL (ref 31–36)
MCV RBC AUTO: 98.6 FL (ref 80–100)
PLATELET # BLD AUTO: 257 K/UL (ref 135–450)
PMV BLD AUTO: 10.1 FL (ref 5–10.5)
POTASSIUM SERPL-SCNC: 4.5 MMOL/L (ref 3.5–5.1)
PROT SERPL-MCNC: 6.5 G/DL (ref 6.4–8.2)
RBC # BLD AUTO: 3.59 M/UL (ref 4.2–5.9)
SODIUM SERPL-SCNC: 139 MMOL/L (ref 136–145)
TRIGL SERPL-MCNC: 68 MG/DL (ref 0–150)
TSH SERPL DL<=0.005 MIU/L-ACNC: 2.64 UIU/ML (ref 0.27–4.2)
VLDLC SERPL CALC-MCNC: 14 MG/DL
WBC # BLD AUTO: 5.4 K/UL (ref 4–11)

## 2024-12-19 NOTE — TELEPHONE ENCOUNTER
General Question     Subject: REFERRAL FOR MRI  Patient and /or Facility Request: Tommy Amato \"Don\"   Contact Number: [phone]     PATIENT CALLED TO [facility]     PLEASE CALL PATIENT TO ADVISE WHEN HE CAN SCHEDULE

## 2024-12-19 NOTE — TELEPHONE ENCOUNTER
PLEASE IGNORE REQUEST BELOW. LEAVE THE [facility] AS IS. THEY FINALLY GOT BACK TO THE Pt.      PLEASE RESEND THE REFERRAL TO [facility]

## 2024-12-20 DIAGNOSIS — D64.9 ANEMIA, UNSPECIFIED TYPE: ICD-10-CM

## 2024-12-20 DIAGNOSIS — D64.9 ANEMIA, UNSPECIFIED TYPE: Primary | ICD-10-CM

## 2024-12-20 LAB
EST. AVERAGE GLUCOSE BLD GHB EST-MCNC: 99.7 MG/DL
FERRITIN SERPL IA-MCNC: 125 NG/ML (ref 30–400)
HBA1C MFR BLD: 5.1 %
IRON SATN MFR SERPL: 18 % (ref 20–50)
IRON SERPL-MCNC: 53 UG/DL (ref 59–158)
TIBC SERPL-MCNC: 300 UG/DL (ref 260–445)
VIT B12 SERPL-MCNC: 913 PG/ML (ref 211–911)

## 2024-12-23 ENCOUNTER — OFFICE VISIT (OUTPATIENT)
Age: 88
End: 2024-12-23
Payer: MEDICARE

## 2024-12-23 VITALS
TEMPERATURE: 97.5 F | BODY MASS INDEX: 26.07 KG/M2 | RESPIRATION RATE: 16 BRPM | HEIGHT: 66 IN | SYSTOLIC BLOOD PRESSURE: 124 MMHG | WEIGHT: 162.2 LBS | DIASTOLIC BLOOD PRESSURE: 70 MMHG | OXYGEN SATURATION: 97 %

## 2024-12-23 DIAGNOSIS — R01.1 SYSTOLIC EJECTION MURMUR: ICD-10-CM

## 2024-12-23 DIAGNOSIS — Z00.00 MEDICARE ANNUAL WELLNESS VISIT, SUBSEQUENT: Primary | ICD-10-CM

## 2024-12-23 PROCEDURE — 1123F ACP DISCUSS/DSCN MKR DOCD: CPT | Performed by: FAMILY MEDICINE

## 2024-12-23 PROCEDURE — G0439 PPPS, SUBSEQ VISIT: HCPCS | Performed by: FAMILY MEDICINE

## 2024-12-23 PROCEDURE — 1159F MED LIST DOCD IN RCRD: CPT | Performed by: FAMILY MEDICINE

## 2024-12-23 PROCEDURE — 1160F RVW MEDS BY RX/DR IN RCRD: CPT | Performed by: FAMILY MEDICINE

## 2024-12-23 PROCEDURE — 1126F AMNT PAIN NOTED NONE PRSNT: CPT | Performed by: FAMILY MEDICINE

## 2024-12-23 RX ORDER — CLOBETASOL PROPIONATE 0.5 MG/G
CREAM TOPICAL 2 TIMES DAILY
COMMUNITY

## 2024-12-23 ASSESSMENT — PATIENT HEALTH QUESTIONNAIRE - PHQ9
SUM OF ALL RESPONSES TO PHQ QUESTIONS 1-9: 1
SUM OF ALL RESPONSES TO PHQ QUESTIONS 1-9: 1
SUM OF ALL RESPONSES TO PHQ9 QUESTIONS 1 & 2: 1
2. FEELING DOWN, DEPRESSED OR HOPELESS: SEVERAL DAYS
SUM OF ALL RESPONSES TO PHQ QUESTIONS 1-9: 1
SUM OF ALL RESPONSES TO PHQ QUESTIONS 1-9: 1
1. LITTLE INTEREST OR PLEASURE IN DOING THINGS: NOT AT ALL

## 2024-12-23 ASSESSMENT — LIFESTYLE VARIABLES
HOW MANY STANDARD DRINKS CONTAINING ALCOHOL DO YOU HAVE ON A TYPICAL DAY: 1 OR 2
HOW OFTEN DO YOU HAVE A DRINK CONTAINING ALCOHOL: MONTHLY OR LESS

## 2024-12-23 NOTE — PROGRESS NOTES
Interventions:    Fall Risk:  Do you feel unsteady or are you worried about falling? : (!) yes  2 or more falls in past year?: (!) yes  Fall with injury in past year?: (!) yes     Interventions:    Is working [facility] on balance.  Plans exercise class once he moves.                 General HRA Questions:  Select all that apply: (!) Stress (With new limitations)    Interventions - Stress:  See above      Inactivity:  On average, how many days per week do you engage in moderate to strenuous exercise (like a brisk walk)?: 2 days (PT) (!) Abnormal  On average, how many minutes do you engage in exercise at this level?: 60 min    Interventions:  Patient comments: is doing PT exercise daily.         Vision Screen:  Do you have difficulty driving, watching TV, or doing any of your daily activities because of your eyesight?: (!) Yes  Have you had an eye exam within the past year?: Appointment is scheduled    Interventions:   Patient comments: eyes feel dry all the time.  Uses Refresh      Advanced Directives:  Do you have a Living Will?: (!) No    Intervention:  Has DPOA and family is aware of his wishes.  Will provide copy.                     Objective   Vitals:    12/23/24 1117   BP: 104/66   Site: Left Upper Arm   Position: Sitting   Cuff Size: Medium Adult   Resp: 16   Temp: 97.5 °F (36.4 °C)   TempSrc: Temporal   SpO2: 97%   Weight: 73.6 kg (162 lb 3.2 oz)   Height: 1.676 m (5' 6\")      Body mass index is 26.18 kg/m².        General Appearance: alert and oriented to person, place and time, well developed and well- nourished, in no acute distress  Skin: warm and dry, no rash or erythema  Head: normocephalic and atraumatic  Eyes: pupils equal, round, and reactive to light, extraocular eye movements intact, conjunctivae normal  ENT: tympanic membrane, external ear and ear canal normal bilaterally, nose without deformity, nasal mucosa and turbinates normal without polyps  Neck: supple and non-tender without mass, no

## 2024-12-23 NOTE — PATIENT INSTRUCTIONS
strong.  Includes walking, dancing, and gardening.  Aim for at least 2½ hours spread throughout the week.  It improves your energy and can help you sleep better.  Muscle-strengthening.  This type of activity can help maintain muscle and strengthen bones.  Includes climbing stairs, using resistance bands, and lifting or carrying heavy loads.  Aim for at least twice a week.  It can help protect the knees and other joints.  Stretching.  Stretching gives you better range of motion in joints and muscles.  Includes upper arm stretches, calf stretches, and gentle yoga.  Aim for at least twice a week, preferably after your muscles are warmed up from other activities.  It can help you function better in daily life.  Balancing.  This helps you stay coordinated and have good posture.  Includes heel-to-toe walking, philipp chi, and certain types of yoga.  Aim for at least 3 days a week.  It can reduce your risk of falling.  Even if you have a hard time meeting the recommendations, it's better to be more active than less active. All activity done in each category counts toward your weekly total. You'd be surprised how daily things like carrying groceries, keeping up with grandchildren, and taking the stairs can add up.  What keeps you from being active?  If you've had a hard time being more active, you're not alone. Maybe you remember being able to do more. Or maybe you've never thought of yourself as being active. It's frustrating when you can't do the things you want. Being more active can help. What's holding you back?  Getting started.  Have a goal, but break it into easy tasks. Small steps build into big accomplishments.  Staying motivated.  If you feel like skipping your activity, remember your goal. Maybe you want to move better and stay independent. Every activity gets you one step closer.  Not feeling your best.  Start with 5 minutes of an activity you enjoy. Prove to yourself you can do it. As you get comfortable, increase

## 2024-12-24 ENCOUNTER — TELEPHONE (OUTPATIENT)
Dept: ORTHOPEDIC SURGERY | Age: 88
End: 2024-12-24

## 2024-12-24 NOTE — TELEPHONE ENCOUNTER
Test Results     Type of Test: MRI  Date of Test: 12/21/24  Location of Test: [facility]  Patient Contact Number: [phone]     HAVE WE RECEIVED RESULTS? PLEASE CALL TO ADVISE [phone]

## 2024-12-30 ENCOUNTER — APPOINTMENT (OUTPATIENT)
Dept: PHYSICAL THERAPY | Age: 88
End: 2024-12-30
Payer: MEDICARE

## 2024-12-30 NOTE — TELEPHONE ENCOUNTER
General Question     Subject: Pt ASKING FOR TEST RESULTS.   Patient and /or Facility Request: Tommy Amato \"Don\"   Contact Number: [phone]      Pt CALLING AND HE IS VERY CONCERNED THAT HE WON'T GET A CALL BACK ON TEST RESULTS. HE CAN SEE THEM IN HIS MYCHART, AND SENT A MESSAGE, BUT HE WOULD REALLY LIKE TO KNOW WHAT THE RESULTS SAY.     PLEASE CALL [facility]SA TO ADVISE.

## 2025-01-06 ENCOUNTER — PATIENT MESSAGE (OUTPATIENT)
Age: 89
End: 2025-01-06

## 2025-01-06 RX ORDER — AZITHROMYCIN 250 MG/1
TABLET, FILM COATED ORAL
Qty: 1 PACKET | Refills: 0 | Status: SHIPPED | OUTPATIENT
Start: 2025-01-06 | End: 2025-01-16

## 2025-01-06 NOTE — TELEPHONE ENCOUNTER
Called patient.  One week chest congestion.   Wheezing when lying down.  Mild dyspnea.  No fever.  + nasal congestion with thick discharge.  Ears are full.  Denies chest pain, ST.  Did not do a COVID test.NO GI sxs.  Rx: none    Lab Results   Component Value Date/Time     12/19/2024 08:39 AM    K 4.5 12/19/2024 08:39 AM     12/19/2024 08:39 AM    CO2 26 12/19/2024 08:39 AM    BUN 14 12/19/2024 08:39 AM    CREATININE 0.9 12/19/2024 08:39 AM    GLUCOSE 93 12/19/2024 08:39 AM    CALCIUM 9.8 12/19/2024 08:39 AM    LABGLOM 82 12/19/2024 08:39 AM    LABGLOM >60 12/13/2023 08:44 AM       Allergies   Allergen Reactions    Percodan [Oxycodone-Aspirin] Nausea Only      Zpak  Side effects of current medications reviewed and questions answered.   Call or return to clinic prn if these symptoms worsen or fail to improve as anticipated.

## 2025-01-10 ENCOUNTER — TELEPHONE (OUTPATIENT)
Age: 89
End: 2025-01-10

## 2025-01-10 NOTE — TELEPHONE ENCOUNTER
Ok to sched appt; if first one is negative will have to come back a day or two later to repeat.  If first one is + repeat won't be necessary.   Thanks

## 2025-01-10 NOTE — TELEPHONE ENCOUNTER
Patient called in     He was given a FIT test 12/23/24    Patient says he's having some issue with it     Just a brief description- \" it wont go in tube\"     Patient would like to know should he come in office for \"rectal exam\" ?

## 2025-01-13 ENCOUNTER — PROCEDURE VISIT (OUTPATIENT)
Dept: SURGERY | Age: 89
End: 2025-01-13
Payer: MEDICARE

## 2025-01-13 VITALS — HEART RATE: 82 BPM | SYSTOLIC BLOOD PRESSURE: 122 MMHG | DIASTOLIC BLOOD PRESSURE: 78 MMHG

## 2025-01-13 DIAGNOSIS — C44.41 BASAL CELL CARCINOMA (BCC) OF SCALP: Primary | ICD-10-CM

## 2025-01-13 PROCEDURE — 17311 MOHS 1 STAGE H/N/HF/G: CPT | Performed by: DERMATOLOGY

## 2025-01-13 PROCEDURE — 12032 INTMD RPR S/A/T/EXT 2.6-7.5: CPT | Performed by: DERMATOLOGY

## 2025-01-13 PROCEDURE — 17312 MOHS ADDL STAGE: CPT | Performed by: DERMATOLOGY

## 2025-01-13 NOTE — PROGRESS NOTES
PRE-PROCEDURE SCREENING    Pacemaker/ICD: No  Difficulty with numbing in the past: No  Local Anesthesia Reaction/passing out: No  Latex or adhesive allergy:  No  Any history of reaction to suture or skin glue:  no  Bleeding/Clotting Disorders: No  Anticoagulant Therapy: No  Joint prosthesis: No  Artificial Heart Valve: No  Stroke or Seizures: Yes, hx of mini stroke   Organ Transplant or Lymphoma: No  Immunosuppression: No  Respiratory Problems: No

## 2025-01-13 NOTE — PATIENT INSTRUCTIONS
[facility] Office Hours:    Monday-Thursday  7:30 AM-4:30 PM    Friday  9:00 AM-1:00 PM      POST-OPERATIVE CARE FOR STITCHES  Bandage change after 48 hours    CARING FOR YOUR SURGICAL SITE  The bandage should remain on and completely dry for 48 hours. Do NOT get the bandage wet.  After the first 48 hours, gently remove the remaining part of the bandage. It can be helpful to moisten the bandage edges in the shower. Steri strips may still be on the wound. It is ok, they will fall off slowly with the daily bandage changes.  Gently clean the wound daily with mild soap and water. Try to clean off crust and debris.   Dry (pat) the area with a clean Q-tip or gauze.   Apply a layer of Vaseline/ Aquaphor (or Bacitracin if your doctor recommends) to the wound area only.  Cut a piece of Telfa (or any non-stick dressing) to fit just over the wound and secure it with paper tape. If the wound is small you may use a Band- Aid. Keep area covered for a total of 2-3 week(s).  If the dressing comes off or if you have questions, or concerns about the dressing, please call the office for instructions!    POST OPERATIVE INSTRUCTIONS    Activity: Do not lift anything heavier than a gallon of milk for 1 week. Also, avoid strenuous activity such as running, power walking or contact sports.  Eating and drinking: Do not drink alcohol for 48 hours after your procedure. Alcohol increases the chances of bleeding.  Medicines   -If you have discomfort, take Acetaminophen (Tylenol or Extra Strength Tylenol). Follow the instructions and warning on the bottle.  -If your doctor has prescribed you an Aspirin daily, please keep taking it. Do not take extra Aspirin or medicines containing Aspirin (such as Deepa-Carney and Excedrin) for 48 hours after your procedure.    Bleeding: If bleeding occurs, DO NOT remove the bandage. Put firm pressure on the area with gauze for 20 minutes without peeking. If the bleeding continues, apply

## 2025-01-13 NOTE — PROGRESS NOTES
MOHS PROCEDURE NOTE    PHYSICIAN:  Leonarda Santos MD, Who operated in two distinct and integrated capacities as the surgeon removing the tissue and as the pathologist examining the tissue.    ASSISTANT: Stephanie Rousseau RN, Andrew Olivo RN     REFERRING PROVIDER:   Jun Perea MD    PREOPERATIVE DIAGNOSIS: Superficial Basal Cell Carcinoma, pigmented     SPECIFIC MOHS INDICATIONS:  location, clinically ill-defined borders, and need for tissue conservation    AUC SCORIN/9    POSTOPERATIVE DIAGNOSIS: SAME    LOCATION: Right superior parietal scalp    OPERATIVE PROCEDURE:  MOHS MICROGRAPHIC SURGERY    RECONSTRUCTION OF DEFECT: Intermediate layered closure    PREOPERATIVE SIZE: 16x12 MM    DEFECT SIZE: 30x25 MM    LENGTH OF REPAIRED WOUND/SIZE OF FLAP/SIZE OF GRAFT:  47 MM    ANESTHESIA:  14mL 1% lidocaine with epinephrine 1:100,000 buffered.     EBL:  MINIMAL    DURATION OF PROCEDURE:  4 HOURS    POSTOPERATIVE OBSERVATION: 1 HOUR    SPECIMENS:  SEE MOHS MAP    COMPLICATIONS:  NONE    DESCRIPTION OF PROCEDURE:  The patient was given a mirror, as appropriate, and the biopsy site was identified, marked with a surgical marking pen, and verified by the patient.   Options for treatment were discussed and the patient was informed that Mohs surgery was the selected treatment based on its lower recurrence rate, given the features listed above, as compared to other treatment modalities such as excision, radiation, or curettage, and agreed with this treatment plan.  Risks and benefits including bruising, swelling, bleeding, infection, nerve injury, recurrence, and scarring were discussed with the patient prior to the procedure and a written consent detailing these and other risks was reviewed with the patient and signed.    There was a time out for person and procedure verification.  The surgical site was prepped with an antiseptic solution.  Application of an antiseptic solution was repeated before each surgical stage.

## 2025-01-14 ENCOUNTER — PATIENT MESSAGE (OUTPATIENT)
Age: 89
End: 2025-01-14

## 2025-01-14 ENCOUNTER — HOSPITAL ENCOUNTER (OUTPATIENT)
Dept: GENERAL RADIOLOGY | Age: 89
Discharge: HOME | End: 2025-01-14
Payer: MEDICARE

## 2025-01-14 ENCOUNTER — TELEPHONE (OUTPATIENT)
Dept: SURGERY | Age: 89
End: 2025-01-14

## 2025-01-14 ENCOUNTER — HOSPITAL ENCOUNTER (OUTPATIENT)
Age: 89
Discharge: HOME | End: 2025-01-14
Payer: MEDICARE

## 2025-01-14 DIAGNOSIS — R05.1 ACUTE COUGH: Primary | ICD-10-CM

## 2025-01-14 DIAGNOSIS — R05.1 ACUTE COUGH: ICD-10-CM

## 2025-01-14 PROCEDURE — 71046 X-RAY EXAM CHEST 2 VIEWS: CPT

## 2025-01-14 RX ORDER — CEFUROXIME AXETIL 500 MG/1
500 TABLET ORAL 2 TIMES DAILY
Qty: 14 TABLET | Refills: 0 | Status: SHIPPED | OUTPATIENT
Start: 2025-01-14 | End: 2025-01-15

## 2025-01-14 NOTE — TELEPHONE ENCOUNTER
The patient was in the office on 1/13/2025 for mohs located on the right superior parietal scalp with ILC repair.  The patient tolerated the procedure well and left the office in good condition.    Pain level on post-operative day 1:  Had some someone soreness and took otc medication.    Any bleeding episode that required pressure to be held, bandage change or a call to the office or MD?  no     Any other issues?:  He said  was to get a chest xray because he was having a cough lately and runny nose.    A post-operative telephone call was placed at 4:11pm in order to check on the patient's recovery process.  The patient reported doing well and had no complaints other than those listed above, if any.  All of the patient's questions were answered.

## 2025-01-15 ENCOUNTER — OFFICE VISIT (OUTPATIENT)
Age: 89
End: 2025-01-15
Payer: MEDICARE

## 2025-01-15 VITALS
HEART RATE: 72 BPM | TEMPERATURE: 98.1 F | DIASTOLIC BLOOD PRESSURE: 78 MMHG | HEIGHT: 67 IN | SYSTOLIC BLOOD PRESSURE: 140 MMHG | OXYGEN SATURATION: 99 % | RESPIRATION RATE: 16 BRPM | BODY MASS INDEX: 26.78 KG/M2 | WEIGHT: 170.6 LBS

## 2025-01-15 DIAGNOSIS — J40 BRONCHITIS: Primary | ICD-10-CM

## 2025-01-15 DIAGNOSIS — D50.9 IRON DEFICIENCY ANEMIA, UNSPECIFIED IRON DEFICIENCY ANEMIA TYPE: ICD-10-CM

## 2025-01-15 DIAGNOSIS — C44.41 BASAL CELL CARCINOMA OF SCALP: ICD-10-CM

## 2025-01-15 PROCEDURE — 99214 OFFICE O/P EST MOD 30 MIN: CPT | Performed by: FAMILY MEDICINE

## 2025-01-15 PROCEDURE — 1159F MED LIST DOCD IN RCRD: CPT | Performed by: FAMILY MEDICINE

## 2025-01-15 PROCEDURE — 1123F ACP DISCUSS/DSCN MKR DOCD: CPT | Performed by: FAMILY MEDICINE

## 2025-01-15 PROCEDURE — 1160F RVW MEDS BY RX/DR IN RCRD: CPT | Performed by: FAMILY MEDICINE

## 2025-01-15 RX ORDER — CEFUROXIME AXETIL 500 MG/1
500 TABLET ORAL 2 TIMES DAILY
Qty: 14 TABLET | Refills: 0 | Status: SHIPPED | OUTPATIENT
Start: 2025-01-15 | End: 2025-01-22

## 2025-01-15 SDOH — ECONOMIC STABILITY: FOOD INSECURITY: WITHIN THE PAST 12 MONTHS, YOU WORRIED THAT YOUR FOOD WOULD RUN OUT BEFORE YOU GOT MONEY TO BUY MORE.: NEVER TRUE

## 2025-01-15 SDOH — ECONOMIC STABILITY: FOOD INSECURITY: WITHIN THE PAST 12 MONTHS, THE FOOD YOU BOUGHT JUST DIDN'T LAST AND YOU DIDN'T HAVE MONEY TO GET MORE.: NEVER TRUE

## 2025-01-15 ASSESSMENT — PATIENT HEALTH QUESTIONNAIRE - PHQ9
1. LITTLE INTEREST OR PLEASURE IN DOING THINGS: NOT AT ALL
SUM OF ALL RESPONSES TO PHQ QUESTIONS 1-9: 0
SUM OF ALL RESPONSES TO PHQ QUESTIONS 1-9: 0
SUM OF ALL RESPONSES TO PHQ9 QUESTIONS 1 & 2: 0
SUM OF ALL RESPONSES TO PHQ QUESTIONS 1-9: 0
SUM OF ALL RESPONSES TO PHQ QUESTIONS 1-9: 0
2. FEELING DOWN, DEPRESSED OR HOPELESS: NOT AT ALL

## 2025-01-15 NOTE — PROGRESS NOTES
Subjective:      Patient ID: Tommy Amato 89 y.o. male. is here for evaluation for cough.        HPI    Had Moh's a few days ago; would like me to change dressing.     Anemia: has FIT cards at home but does not feel he is able to do them.  No paris or hematochezia.     Cough:  cough and congestion started a few weeks ago.  Was prescribed Zpak on 1/6.  It did not help at all.  Is \"whistling\" when he gets up in the AM.  Has occasionally felt short of breath when lying down but not when active.  Has clear nasal discharge and a dry cough.  He denies fever, ST, ear pain, nausea, vomiting or diarrhea.   Has increased over time.   Did not do a COVID.  CXR yesterday:   Details    Reading Physician Reading Date Result Priority   Reno Steinberg MD  [phone] 1/14/2025      Narrative & Impression  DATE: 1/14/2025     EXAM: XR CHEST (2 VW)     INDICATION: R05.1: Acute cough,     COMPARISON: None     FINDINGS:     HEART / MEDIASTINUM: Normal in size.     LUNGS/PLEURA: Mild ill-defined density in the lung bases. Faint interstitial prominence. Bilateral costophrenic angle blunting.     BONES / SOFT TISSUES: No acute abnormality.     OTHER: None.     IMPRESSION:     1. Mild basilar airspace disease and small pleural effusions.  2. Faint interstitial prominence, acute interstitial thickening versus chronic finding.     Electronically signed by Reno Steinberg MD       Outpatient Medications Marked as Taking for the 1/15/25 encounter (Office Visit) with Columba Oviedo MD   Medication Sig Dispense Refill    clobetasol (TEMOVATE) 0.05 % cream Apply topically 2 times daily Apply topically 2 times daily.      lisinopril (PRINIVIL;ZESTRIL) 10 MG tablet Take 1 tablet by mouth daily 90 tablet 1    linaclotide (LINZESS) 145 MCG capsule Take 1 capsule by mouth every morning (before breakfast) 90 capsule 1    mirabegron (MYRBETRIQ) 50 MG TB24 TAKE 1 TABLET DAILY 90 tablet 3    simvastatin (ZOCOR) 20 MG tablet Take 1 tablet by

## 2025-01-22 ENCOUNTER — OFFICE VISIT (OUTPATIENT)
Age: 89
End: 2025-01-22
Payer: MEDICARE

## 2025-01-22 VITALS
TEMPERATURE: 97.5 F | BODY MASS INDEX: 27.46 KG/M2 | DIASTOLIC BLOOD PRESSURE: 80 MMHG | HEART RATE: 60 BPM | RESPIRATION RATE: 16 BRPM | WEIGHT: 174 LBS | SYSTOLIC BLOOD PRESSURE: 130 MMHG

## 2025-01-22 DIAGNOSIS — J84.9 INTERSTITIAL LUNG DISEASE (HCC): ICD-10-CM

## 2025-01-22 DIAGNOSIS — R01.1 HEART MURMUR: ICD-10-CM

## 2025-01-22 DIAGNOSIS — I10 ESSENTIAL HYPERTENSION, BENIGN: ICD-10-CM

## 2025-01-22 DIAGNOSIS — R06.02 SHORTNESS OF BREATH: ICD-10-CM

## 2025-01-22 DIAGNOSIS — D50.9 IRON DEFICIENCY ANEMIA, UNSPECIFIED IRON DEFICIENCY ANEMIA TYPE: Primary | ICD-10-CM

## 2025-01-22 DIAGNOSIS — R05.1 ACUTE COUGH: ICD-10-CM

## 2025-01-22 DIAGNOSIS — R06.09 DYSPNEA ON EXERTION: ICD-10-CM

## 2025-01-22 DIAGNOSIS — R60.0 PEDAL EDEMA: ICD-10-CM

## 2025-01-22 PROCEDURE — 1123F ACP DISCUSS/DSCN MKR DOCD: CPT | Performed by: FAMILY MEDICINE

## 2025-01-22 PROCEDURE — 1160F RVW MEDS BY RX/DR IN RCRD: CPT | Performed by: FAMILY MEDICINE

## 2025-01-22 PROCEDURE — 99214 OFFICE O/P EST MOD 30 MIN: CPT | Performed by: FAMILY MEDICINE

## 2025-01-22 PROCEDURE — 1159F MED LIST DOCD IN RCRD: CPT | Performed by: FAMILY MEDICINE

## 2025-01-22 RX ORDER — BUDESONIDE AND FORMOTEROL FUMARATE DIHYDRATE 80; 4.5 UG/1; UG/1
2 AEROSOL RESPIRATORY (INHALATION) 2 TIMES DAILY
Qty: 10.2 G | Refills: 2 | Status: SHIPPED | OUTPATIENT
Start: 2025-01-22 | End: 2025-01-24 | Stop reason: SDUPTHER

## 2025-01-22 RX ORDER — VALSARTAN 80 MG/1
80 TABLET ORAL DAILY
Qty: 90 TABLET | Refills: 1 | Status: SHIPPED | OUTPATIENT
Start: 2025-01-22 | End: 2025-01-24 | Stop reason: SDUPTHER

## 2025-01-22 NOTE — PROGRESS NOTES
Subjective:      Patient ID: Tommy Amato 89 y.o. male. The encounter diagnosis was Iron deficiency anemia, unspecified iron deficiency anemia type.      HPI      Mild anemia.  Asymptomatic.  Unable to do FIT at home.  Had JOLENE with negative FIT last week.  Here for second FIT.    Cough:  CXR showed mild basilar air space disease.  Was on Zpak and then Ceftin.  Not improving.  Not getting worse. Wheezing.  Short of breath with exertion - making the bed, walking longer distances ( to end of the marie ).  Cough is dry.  No fever, chest pain or heaviness, myalgia, fatigue, PND or orthopnea.   Chronic edema since fell and hurt his knee, unchanged.         Lab Results   Component Value Date    WBC 5.4 12/19/2024    HGB 11.6 (L) 12/19/2024    HCT 35.4 (L) 12/19/2024    MCV 98.6 12/19/2024     12/19/2024     Lab Results   Component Value Date    IRON 53 (L) 12/19/2024    TIBC 300 12/19/2024    FERRITIN 125.0 12/19/2024          Outpatient Medications Marked as Taking for the 1/22/25 encounter (Office Visit) with Columba Oviedo MD   Medication Sig Dispense Refill    cefUROXime (CEFTIN) 500 MG tablet Take 1 tablet by mouth 2 times daily for 7 days 14 tablet 0    clobetasol (TEMOVATE) 0.05 % cream Apply topically 2 times daily Apply topically 2 times daily.      lisinopril (PRINIVIL;ZESTRIL) 10 MG tablet Take 1 tablet by mouth daily 90 tablet 1    linaclotide (LINZESS) 145 MCG capsule Take 1 capsule by mouth every morning (before breakfast) 90 capsule 1    mirabegron (MYRBETRIQ) 50 MG TB24 TAKE 1 TABLET DAILY 90 tablet 3    simvastatin (ZOCOR) 20 MG tablet Take 1 tablet by mouth nightly 90 tablet 1    diclofenac sodium (VOLTAREN) 1 % GEL Apply 4 g topically 2 times daily 350 g 0    vitamin B-12 (CYANOCOBALAMIN) 1000 MCG tablet Take 1 tablet by mouth daily 90 tablet 1    Polyvinyl Alcohol-Povidone PF (REFRESH) 1.4-0.6 % SOLN ophthalmic solution Place 1 drop into both eyes as needed (Dry eye) 0.4 mL 2    Multiple

## 2025-01-24 DIAGNOSIS — I10 ESSENTIAL HYPERTENSION, BENIGN: ICD-10-CM

## 2025-01-24 DIAGNOSIS — R06.09 DYSPNEA ON EXERTION: ICD-10-CM

## 2025-01-24 RX ORDER — BUDESONIDE AND FORMOTEROL FUMARATE DIHYDRATE 80; 4.5 UG/1; UG/1
2 AEROSOL RESPIRATORY (INHALATION) 2 TIMES DAILY
Qty: 10.2 G | Refills: 2 | Status: SHIPPED | OUTPATIENT
Start: 2025-01-24

## 2025-01-24 RX ORDER — VALSARTAN 80 MG/1
80 TABLET ORAL DAILY
Qty: 90 TABLET | Refills: 1 | Status: SHIPPED | OUTPATIENT
Start: 2025-01-24

## 2025-01-24 NOTE — TELEPHONE ENCOUNTER
Pt's wife, Robina, called in to say that pt's most recent prescriptions are lost. [employer] [facility] sent them their new address but it was incorrect. Pt sent this to [employer] and so the meds were sent to [address] when it should have been [address]. They asked people who live in that building (across the driveway from them) and no one has seen it. Correct address now in system.    Was his inhaler and \"something that replaced lisinopril\".     Needs new script. Her insurance says they will not pay for it again, she says they will pay for it oop if needed. I can call insurance company to see what can be done to get it paid for, if possible.    Requested Prescriptions     Pending Prescriptions Disp Refills    budesonide-formoterol (SYMBICORT) 80-4.5 MCG/ACT AERO 10.2 g 2     Sig: Inhale 2 puffs into the lungs 2 times daily    valsartan (DIOVAN) 80 MG tablet 90 tablet 1     Sig: Take 1 tablet by mouth daily      Recommended that they also have a local pharmacy when they get settled just in case.

## 2025-01-27 ENCOUNTER — HOSPITAL ENCOUNTER (INPATIENT)
Age: 89
LOS: 3 days | Discharge: ANOTHER HOSPITAL | DRG: 280 | End: 2025-01-30
Attending: STUDENT IN AN ORGANIZED HEALTH CARE EDUCATION/TRAINING PROGRAM | Admitting: FAMILY MEDICINE
Payer: MEDICARE

## 2025-01-27 ENCOUNTER — TELEPHONE (OUTPATIENT)
Age: 89
End: 2025-01-27

## 2025-01-27 ENCOUNTER — APPOINTMENT (OUTPATIENT)
Dept: GENERAL RADIOLOGY | Age: 89
DRG: 280 | End: 2025-01-27
Payer: MEDICARE

## 2025-01-27 DIAGNOSIS — I35.0 AORTIC VALVE STENOSIS, ETIOLOGY OF CARDIAC VALVE DISEASE UNSPECIFIED: ICD-10-CM

## 2025-01-27 DIAGNOSIS — Z01.810 PREOP CARDIOVASCULAR EXAM: ICD-10-CM

## 2025-01-27 DIAGNOSIS — R60.0 BILATERAL LOWER EXTREMITY EDEMA: ICD-10-CM

## 2025-01-27 DIAGNOSIS — I50.9 CONGESTIVE HEART FAILURE, UNSPECIFIED HF CHRONICITY, UNSPECIFIED HEART FAILURE TYPE (HCC): ICD-10-CM

## 2025-01-27 DIAGNOSIS — R05.1 ACUTE COUGH: ICD-10-CM

## 2025-01-27 DIAGNOSIS — I50.9 ACUTE CONGESTIVE HEART FAILURE, UNSPECIFIED HEART FAILURE TYPE (HCC): Primary | ICD-10-CM

## 2025-01-27 DIAGNOSIS — R01.1 HEART MURMUR: ICD-10-CM

## 2025-01-27 DIAGNOSIS — R06.02 SHORTNESS OF BREATH: ICD-10-CM

## 2025-01-27 LAB
ALBUMIN SERPL-MCNC: 3.7 G/DL (ref 3.4–5)
ALBUMIN/GLOB SERPL: 1.5 {RATIO} (ref 1.1–2.2)
ALP SERPL-CCNC: 84 U/L (ref 40–129)
ALT SERPL-CCNC: 61 U/L (ref 10–40)
ANION GAP SERPL CALCULATED.3IONS-SCNC: 11 MMOL/L (ref 3–16)
AST SERPL-CCNC: 64 U/L (ref 15–37)
BASOPHILS # BLD: 0 K/UL (ref 0–0.2)
BASOPHILS NFR BLD: 0.2 %
BILIRUB SERPL-MCNC: 0.6 MG/DL (ref 0–1)
BUN SERPL-MCNC: 37 MG/DL (ref 7–20)
CALCIUM SERPL-MCNC: 9.7 MG/DL (ref 8.3–10.6)
CHLORIDE SERPL-SCNC: 107 MMOL/L (ref 99–110)
CO2 SERPL-SCNC: 22 MMOL/L (ref 21–32)
CREAT SERPL-MCNC: 1.2 MG/DL (ref 0.8–1.3)
DEPRECATED RDW RBC AUTO: 14.8 % (ref 12.4–15.4)
EKG ATRIAL RATE: 98 BPM
EKG DIAGNOSIS: NORMAL
EKG P AXIS: 59 DEGREES
EKG P-R INTERVAL: 188 MS
EKG Q-T INTERVAL: 364 MS
EKG QRS DURATION: 102 MS
EKG QTC CALCULATION (BAZETT): 464 MS
EKG R AXIS: 109 DEGREES
EKG T AXIS: -1 DEGREES
EKG VENTRICULAR RATE: 98 BPM
EOSINOPHIL # BLD: 0 K/UL (ref 0–0.6)
EOSINOPHIL NFR BLD: 0.1 %
FERRITIN SERPL IA-MCNC: 81.5 NG/ML (ref 30–400)
FLUAV RNA RESP QL NAA+PROBE: NOT DETECTED
FLUBV RNA RESP QL NAA+PROBE: NOT DETECTED
GFR SERPLBLD CREATININE-BSD FMLA CKD-EPI: 58 ML/MIN/{1.73_M2}
GLUCOSE SERPL-MCNC: 117 MG/DL (ref 70–99)
HCT VFR BLD AUTO: 36.2 % (ref 40.5–52.5)
HGB BLD-MCNC: 12 G/DL (ref 13.5–17.5)
LYMPHOCYTES # BLD: 0.6 K/UL (ref 1–5.1)
LYMPHOCYTES NFR BLD: 8.3 %
MAGNESIUM SERPL-MCNC: 2.25 MG/DL (ref 1.8–2.4)
MCH RBC QN AUTO: 32.1 PG (ref 26–34)
MCHC RBC AUTO-ENTMCNC: 33.2 G/DL (ref 31–36)
MCV RBC AUTO: 96.7 FL (ref 80–100)
MONOCYTES # BLD: 0.8 K/UL (ref 0–1.3)
MONOCYTES NFR BLD: 11.4 %
NEUTROPHILS # BLD: 5.6 K/UL (ref 1.7–7.7)
NEUTROPHILS NFR BLD: 80 %
NT-PROBNP SERPL-MCNC: ABNORMAL PG/ML (ref 0–449)
PLATELET # BLD AUTO: 202 K/UL (ref 135–450)
PMV BLD AUTO: 10.2 FL (ref 5–10.5)
POTASSIUM SERPL-SCNC: 4.6 MMOL/L (ref 3.5–5.1)
PROT SERPL-MCNC: 6.2 G/DL (ref 6.4–8.2)
RBC # BLD AUTO: 3.75 M/UL (ref 4.2–5.9)
SARS-COV-2 RNA RESP QL NAA+PROBE: NOT DETECTED
SODIUM SERPL-SCNC: 140 MMOL/L (ref 136–145)
TROPONIN, HIGH SENSITIVITY: 73 NG/L (ref 0–22)
TROPONIN, HIGH SENSITIVITY: 75 NG/L (ref 0–22)
TROPONIN, HIGH SENSITIVITY: 75 NG/L (ref 0–22)
TSH SERPL DL<=0.005 MIU/L-ACNC: 1.94 UIU/ML (ref 0.27–4.2)
WBC # BLD AUTO: 7 K/UL (ref 4–11)

## 2025-01-27 PROCEDURE — 6360000002 HC RX W HCPCS: Performed by: FAMILY MEDICINE

## 2025-01-27 PROCEDURE — 6370000000 HC RX 637 (ALT 250 FOR IP)

## 2025-01-27 PROCEDURE — 6370000000 HC RX 637 (ALT 250 FOR IP): Performed by: FAMILY MEDICINE

## 2025-01-27 PROCEDURE — 83735 ASSAY OF MAGNESIUM: CPT

## 2025-01-27 PROCEDURE — 4A023N8 MEASUREMENT OF CARDIAC SAMPLING AND PRESSURE, BILATERAL, PERCUTANEOUS APPROACH: ICD-10-PCS | Performed by: INTERNAL MEDICINE

## 2025-01-27 PROCEDURE — 84484 ASSAY OF TROPONIN QUANT: CPT

## 2025-01-27 PROCEDURE — 71046 X-RAY EXAM CHEST 2 VIEWS: CPT

## 2025-01-27 PROCEDURE — 36415 COLL VENOUS BLD VENIPUNCTURE: CPT

## 2025-01-27 PROCEDURE — 1200000000 HC SEMI PRIVATE

## 2025-01-27 PROCEDURE — 93005 ELECTROCARDIOGRAM TRACING: CPT

## 2025-01-27 PROCEDURE — B2111ZZ FLUOROSCOPY OF MULTIPLE CORONARY ARTERIES USING LOW OSMOLAR CONTRAST: ICD-10-PCS | Performed by: INTERNAL MEDICINE

## 2025-01-27 PROCEDURE — 85025 COMPLETE CBC W/AUTO DIFF WBC: CPT

## 2025-01-27 PROCEDURE — 99285 EMERGENCY DEPT VISIT HI MDM: CPT

## 2025-01-27 PROCEDURE — 94640 AIRWAY INHALATION TREATMENT: CPT

## 2025-01-27 PROCEDURE — 6360000002 HC RX W HCPCS

## 2025-01-27 PROCEDURE — 87636 SARSCOV2 & INF A&B AMP PRB: CPT

## 2025-01-27 PROCEDURE — 82728 ASSAY OF FERRITIN: CPT

## 2025-01-27 PROCEDURE — 83880 ASSAY OF NATRIURETIC PEPTIDE: CPT

## 2025-01-27 PROCEDURE — 84443 ASSAY THYROID STIM HORMONE: CPT

## 2025-01-27 PROCEDURE — 83540 ASSAY OF IRON: CPT

## 2025-01-27 PROCEDURE — 80053 COMPREHEN METABOLIC PANEL: CPT

## 2025-01-27 PROCEDURE — 2500000003 HC RX 250 WO HCPCS: Performed by: FAMILY MEDICINE

## 2025-01-27 PROCEDURE — 83550 IRON BINDING TEST: CPT

## 2025-01-27 PROCEDURE — 83036 HEMOGLOBIN GLYCOSYLATED A1C: CPT

## 2025-01-27 RX ORDER — VALSARTAN 80 MG/1
80 TABLET ORAL DAILY
Status: DISCONTINUED | OUTPATIENT
Start: 2025-01-27 | End: 2025-01-30 | Stop reason: HOSPADM

## 2025-01-27 RX ORDER — SODIUM CHLORIDE 9 MG/ML
INJECTION, SOLUTION INTRAVENOUS PRN
Status: DISCONTINUED | OUTPATIENT
Start: 2025-01-27 | End: 2025-01-30 | Stop reason: HOSPADM

## 2025-01-27 RX ORDER — ACETAMINOPHEN 325 MG/1
650 TABLET ORAL EVERY 6 HOURS PRN
Status: DISCONTINUED | OUTPATIENT
Start: 2025-01-27 | End: 2025-01-30 | Stop reason: HOSPADM

## 2025-01-27 RX ORDER — ACETAMINOPHEN 650 MG/1
650 SUPPOSITORY RECTAL EVERY 6 HOURS PRN
Status: DISCONTINUED | OUTPATIENT
Start: 2025-01-27 | End: 2025-01-30 | Stop reason: HOSPADM

## 2025-01-27 RX ORDER — ASPIRIN 81 MG/1
81 TABLET, CHEWABLE ORAL DAILY
Status: DISCONTINUED | OUTPATIENT
Start: 2025-01-28 | End: 2025-01-30 | Stop reason: HOSPADM

## 2025-01-27 RX ORDER — FUROSEMIDE 10 MG/ML
40 INJECTION INTRAMUSCULAR; INTRAVENOUS ONCE
Status: COMPLETED | OUTPATIENT
Start: 2025-01-27 | End: 2025-01-27

## 2025-01-27 RX ORDER — FUROSEMIDE 10 MG/ML
40 INJECTION INTRAMUSCULAR; INTRAVENOUS 2 TIMES DAILY
Status: DISCONTINUED | OUTPATIENT
Start: 2025-01-27 | End: 2025-01-27

## 2025-01-27 RX ORDER — POLYETHYLENE GLYCOL 3350 17 G/17G
17 POWDER, FOR SOLUTION ORAL DAILY PRN
Status: DISCONTINUED | OUTPATIENT
Start: 2025-01-27 | End: 2025-01-30 | Stop reason: HOSPADM

## 2025-01-27 RX ORDER — SODIUM CHLORIDE 0.9 % (FLUSH) 0.9 %
5-40 SYRINGE (ML) INJECTION EVERY 12 HOURS SCHEDULED
Status: DISCONTINUED | OUTPATIENT
Start: 2025-01-27 | End: 2025-01-30 | Stop reason: HOSPADM

## 2025-01-27 RX ORDER — FUROSEMIDE 10 MG/ML
20 INJECTION INTRAMUSCULAR; INTRAVENOUS 2 TIMES DAILY
Status: DISCONTINUED | OUTPATIENT
Start: 2025-01-28 | End: 2025-01-29

## 2025-01-27 RX ORDER — ATORVASTATIN CALCIUM 40 MG/1
40 TABLET, FILM COATED ORAL NIGHTLY
Status: DISCONTINUED | OUTPATIENT
Start: 2025-01-27 | End: 2025-01-30 | Stop reason: HOSPADM

## 2025-01-27 RX ORDER — SODIUM CHLORIDE 0.9 % (FLUSH) 0.9 %
5-40 SYRINGE (ML) INJECTION PRN
Status: DISCONTINUED | OUTPATIENT
Start: 2025-01-27 | End: 2025-01-30 | Stop reason: HOSPADM

## 2025-01-27 RX ORDER — ENOXAPARIN SODIUM 100 MG/ML
40 INJECTION SUBCUTANEOUS DAILY
Status: DISCONTINUED | OUTPATIENT
Start: 2025-01-27 | End: 2025-01-29

## 2025-01-27 RX ADMIN — ARFORMOTEROL TARTRATE: 15 SOLUTION RESPIRATORY (INHALATION) at 21:08

## 2025-01-27 RX ADMIN — ENOXAPARIN SODIUM 40 MG: 100 INJECTION SUBCUTANEOUS at 20:08

## 2025-01-27 RX ADMIN — FUROSEMIDE 40 MG: 10 INJECTION, SOLUTION INTRAMUSCULAR; INTRAVENOUS at 17:02

## 2025-01-27 RX ADMIN — SODIUM CHLORIDE, PRESERVATIVE FREE 10 ML: 5 INJECTION INTRAVENOUS at 20:13

## 2025-01-27 RX ADMIN — VALSARTAN 80 MG: 80 TABLET, FILM COATED ORAL at 20:05

## 2025-01-27 RX ADMIN — ATORVASTATIN CALCIUM 40 MG: 40 TABLET, FILM COATED ORAL at 20:05

## 2025-01-27 ASSESSMENT — ENCOUNTER SYMPTOMS
DIARRHEA: 0
BLOOD IN STOOL: 0
RHINORRHEA: 0
COUGH: 0
CHEST TIGHTNESS: 0
WHEEZING: 0
COUGH: 1
ABDOMINAL PAIN: 0
SHORTNESS OF BREATH: 1
VOMITING: 0
CONSTIPATION: 0
NAUSEA: 0
WHEEZING: 1

## 2025-01-27 ASSESSMENT — LIFESTYLE VARIABLES
HOW OFTEN DO YOU HAVE A DRINK CONTAINING ALCOHOL: MONTHLY OR LESS
HOW MANY STANDARD DRINKS CONTAINING ALCOHOL DO YOU HAVE ON A TYPICAL DAY: 1 OR 2

## 2025-01-27 ASSESSMENT — PAIN SCALES - GENERAL: PAINLEVEL_OUTOF10: 0

## 2025-01-27 ASSESSMENT — PAIN - FUNCTIONAL ASSESSMENT: PAIN_FUNCTIONAL_ASSESSMENT: 0-10

## 2025-01-27 NOTE — TELEPHONE ENCOUNTER
Spoke with pt's wife:     Actually could hear pt wheezing far in the background, extremely SOB, can hardly speak because he can't breathe, can't walk or eat either x 1 day.  Inhalers not working at all. They do not have a pulse-ox.    Recommended they call squad to ED to be checked out, Robina said she would. Both are hesitant to call, but stressed the importance of doing so, told her that there a couple of things we just don't play with, SOB, especially to this degree, is one of them. Robina promised she would, she is very concerned.

## 2025-01-27 NOTE — ED NOTES
Per ED tech, pt ambulated down hallway. Walking O2 sat 88%     Leonela Dickson, RN  01/27/25 [id number]

## 2025-01-27 NOTE — ED PROVIDER NOTES
THE [facility]  EMERGENCY DEPARTMENT ENCOUNTER          [facility] RESIDENT NOTE       Date of evaluation: 1/27/2025    Chief Complaint     Shortness of Breath (Patient was sent here by PCP for shortness of breath x few weeks, AMS (short attention span), bilateral LE edema, congestion, denies N/V/D. )      History of Present Illness     Tommy Amato is a 89 y.o. male with medical history significant for hypertension, hyperlipidemia, BPH, ischemic stroke, new systolic ejection murmur who presents with a several week history of dyspnea on exertion and wheezing.  He states that he has been getting short of breath when walking short distances like from his apartment door to the elevator.  He also endorses feeling short of breath when he lies flat, his wife states that she has been propping his head up with extra pillows.  He reports having chronic right lower extremity edema, though now feels he has bilateral lower extremity swelling.  Pertinent positives include nonproductive cough.  Pertinent negatives include fever, chills, chest pain, hematemesis, melena, hematochezia.    His PCP has been working this up as an outpatient, he has had a Z-David, a course of cefuroxime, was started on a budesonide-formoterol inhaler, and was switched from lisinopril to valsartan.  Had a chest x-ray couple weeks ago that showed mild basilar airspace disease with small pleural effusions, with some faint interstitial prominence.  PCP had also ordered a CT chest without contrast and echo to be done in February.    ASSESSMENT / PLAN  (MEDICAL DECISION MAKING)     INITIAL VITALS: BP: (!) 144/98, Temp: 98.3 °F (36.8 °C), Pulse: (!) 103, Respirations: 16, SpO2: 97 %     Tommy Amato is a 89 y.o. male presenting with dyspnea on exertion.      Is this patient to be included in the SEP-1 core measure? No Exclusion criteria - the patient is NOT to be included for SEP-1 Core Measure due to: Infection is not suspected    Medical Decision

## 2025-01-27 NOTE — ED PROVIDER NOTES
ED Attending Attestation Note     Date of evaluation: 1/27/2025    This patient was seen by the resident.  I have seen and examined the patient, agree with the workup, evaluation, management and diagnosis. The care plan has been discussed.  My assessment reveals an 89-year-old male with history of hypertension, hyperlipidemia and mild dementia who presented with family for 2-3 works of worsening dyspnea on exertion and orthopnea.  Patient has been following with his PCP, has had unremarkable chest x-rays, was put on a couple courses of antibiotics without improvement and also switched from an ACE inhibitor to an ARB.  Patient symptoms sound consistent with potential new onset heart failure.  He does have bilateral pitting edema.  He is not hypoxic but did desaturate to 86% on a walk test.  BNP quite elevated at 12,500.  Mild Trope elevation at 75.  EKG is nonischemic.  Will diurese and admit with concerns of new onset heart failure.      I reviewed EKG which reveals normal sinus rhythm with frequent PVCs.  Rate 98, , , QTc 464.  There is right axis deviation.  No acute ST elevations or depressions.    Critical Care:  Due to the immediate potential for life-threatening deterioration due to new onset heart failure, I spent 35 minutes providing critical care.  This time excludes time spent performing procedures but includes time spent on direct patient care, history retrieval, review of the chart, and discussions with patient, family, and consultant(s).     Bartolome Hanks MD  01/27/25 1581

## 2025-01-27 NOTE — TELEPHONE ENCOUNTER
Patients spouse called in     Stated \"Don is not doing to well\"     Wife asked to speak with nurse Billie     I asked for more information to send message back     Don is SOB , barely able to walk , \"not right\" wife says     Feb 4th has echo of lungs test     I suggested call life squad - Wife says theres no need
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
## 2025-01-27 NOTE — ED NOTES
Patient Name: Tommy Amato  : 1935 89 y.o.  MRN: [id number]  ED Room #: B18/B18-18     Chief complaint:   Chief Complaint   Patient presents with    Shortness of Breath     Patient was sent here by PCP for shortness of breath x few weeks, AMS (short attention span), bilateral LE edema, congestion, denies N/V/D.      Hospital Problem/Diagnosis:   Hospital Problems             Last Modified POA    * (Principal) Heart failure (HCC) 2025 Yes         O2 Flow Rate:O2 Device: None (Room air)   (if applicable)  Cardiac Rhythm:   (if applicable)  Active LDA's:   Peripheral IV 25 Right Forearm (Active)   Site Assessment Clean, dry & intact 25 1545   Line Status Normal saline locked;Specimen collected 25 1545   Phlebitis Assessment No symptoms 25 1545   Infiltration Assessment 0 25 1545   Dressing Status New dressing applied 25 1545   Dressing Intervention New 25 1545      External Urinary Catheter (Active)   Site Assessment Clean,dry & intact 25 1716          How does patient ambulate? Front Wheeled Walker    2. How does patient take pills? Whole with Water    3. Is patient alert? Alert    4. Is patient oriented? To Person, To Place, To Situation, Follows Commands, and Confused    5.   Patient arrived from:  home  Facility Name: ___________________________________________    6. If patient is disoriented or from a Skill Nursing Facility has family been notified of admission? No    7. Patient belongings? Belongings: Clothing and Cane    Disposition of belongings?      8. Any specific patient or family belongings/needs/dynamics?   a.     9. Miscellaneous comments/pending orders?  a.       If there are any additional questions please reach out to the Emergency Department.        Leonela Dickson RN  25 [phone]

## 2025-01-28 ENCOUNTER — TELEPHONE (OUTPATIENT)
Dept: SURGERY | Age: 89
End: 2025-01-28

## 2025-01-28 ENCOUNTER — APPOINTMENT (OUTPATIENT)
Dept: VASCULAR LAB | Age: 89
DRG: 280 | End: 2025-01-28
Payer: MEDICARE

## 2025-01-28 LAB
ALBUMIN SERPL-MCNC: 3.5 G/DL (ref 3.4–5)
ALBUMIN/GLOB SERPL: 1.5 {RATIO} (ref 1.1–2.2)
ALP SERPL-CCNC: 78 U/L (ref 40–129)
ALT SERPL-CCNC: 56 U/L (ref 10–40)
ANION GAP SERPL CALCULATED.3IONS-SCNC: 10 MMOL/L (ref 3–16)
AST SERPL-CCNC: 55 U/L (ref 15–37)
BILIRUB SERPL-MCNC: 0.8 MG/DL (ref 0–1)
BUN SERPL-MCNC: 32 MG/DL (ref 7–20)
CALCIUM SERPL-MCNC: 9.7 MG/DL (ref 8.3–10.6)
CHLORIDE SERPL-SCNC: 107 MMOL/L (ref 99–110)
CHOLEST SERPL-MCNC: 112 MG/DL (ref 0–199)
CO2 SERPL-SCNC: 26 MMOL/L (ref 21–32)
CREAT SERPL-MCNC: 1 MG/DL (ref 0.8–1.3)
DEPRECATED RDW RBC AUTO: 14.8 % (ref 12.4–15.4)
ECHO BSA: 1.96 M2
EKG ATRIAL RATE: 82 BPM
EKG DIAGNOSIS: NORMAL
EKG P AXIS: 63 DEGREES
EKG P-R INTERVAL: 182 MS
EKG Q-T INTERVAL: 412 MS
EKG QRS DURATION: 110 MS
EKG QTC CALCULATION (BAZETT): 481 MS
EKG R AXIS: -18 DEGREES
EKG T AXIS: 76 DEGREES
EKG VENTRICULAR RATE: 82 BPM
EST. AVERAGE GLUCOSE BLD GHB EST-MCNC: 108.3 MG/DL
GFR SERPLBLD CREATININE-BSD FMLA CKD-EPI: 72 ML/MIN/{1.73_M2}
GLUCOSE SERPL-MCNC: 108 MG/DL (ref 70–99)
HBA1C MFR BLD: 5.4 %
HCT VFR BLD AUTO: 35 % (ref 40.5–52.5)
HDLC SERPL-MCNC: 48 MG/DL (ref 40–60)
HGB BLD-MCNC: 11.6 G/DL (ref 13.5–17.5)
IRON SATN MFR SERPL: 11 % (ref 20–50)
IRON SERPL-MCNC: 35 UG/DL (ref 59–158)
LDLC SERPL CALC-MCNC: 51 MG/DL
MCH RBC QN AUTO: 32.2 PG (ref 26–34)
MCHC RBC AUTO-ENTMCNC: 33.1 G/DL (ref 31–36)
MCV RBC AUTO: 97.2 FL (ref 80–100)
PLATELET # BLD AUTO: 171 K/UL (ref 135–450)
PMV BLD AUTO: 10.4 FL (ref 5–10.5)
POTASSIUM SERPL-SCNC: 3.8 MMOL/L (ref 3.5–5.1)
PROT SERPL-MCNC: 5.9 G/DL (ref 6.4–8.2)
RBC # BLD AUTO: 3.6 M/UL (ref 4.2–5.9)
RBC #/AREA URNS HPF: NORMAL /HPF (ref 0–4)
SODIUM SERPL-SCNC: 143 MMOL/L (ref 136–145)
TIBC SERPL-MCNC: 311 UG/DL (ref 260–445)
TRIGL SERPL-MCNC: 66 MG/DL (ref 0–150)
URN SPEC COLLECT METH UR: NORMAL
VLDLC SERPL CALC-MCNC: 13 MG/DL
WBC # BLD AUTO: 6.7 K/UL (ref 4–11)
WBC #/AREA URNS HPF: NORMAL /HPF (ref 0–5)

## 2025-01-28 PROCEDURE — 1200000000 HC SEMI PRIVATE

## 2025-01-28 PROCEDURE — 82570 ASSAY OF URINE CREATININE: CPT

## 2025-01-28 PROCEDURE — 36415 COLL VENOUS BLD VENIPUNCTURE: CPT

## 2025-01-28 PROCEDURE — 97530 THERAPEUTIC ACTIVITIES: CPT

## 2025-01-28 PROCEDURE — 6360000002 HC RX W HCPCS

## 2025-01-28 PROCEDURE — 97162 PT EVAL MOD COMPLEX 30 MIN: CPT

## 2025-01-28 PROCEDURE — 93970 EXTREMITY STUDY: CPT

## 2025-01-28 PROCEDURE — 6370000000 HC RX 637 (ALT 250 FOR IP): Performed by: FAMILY MEDICINE

## 2025-01-28 PROCEDURE — 93005 ELECTROCARDIOGRAM TRACING: CPT | Performed by: FAMILY MEDICINE

## 2025-01-28 PROCEDURE — 6370000000 HC RX 637 (ALT 250 FOR IP)

## 2025-01-28 PROCEDURE — 97166 OT EVAL MOD COMPLEX 45 MIN: CPT

## 2025-01-28 PROCEDURE — 97116 GAIT TRAINING THERAPY: CPT

## 2025-01-28 PROCEDURE — 85027 COMPLETE CBC AUTOMATED: CPT

## 2025-01-28 PROCEDURE — 80053 COMPREHEN METABOLIC PANEL: CPT

## 2025-01-28 PROCEDURE — 81015 MICROSCOPIC EXAM OF URINE: CPT

## 2025-01-28 PROCEDURE — 2500000003 HC RX 250 WO HCPCS: Performed by: FAMILY MEDICINE

## 2025-01-28 PROCEDURE — 80061 LIPID PANEL: CPT

## 2025-01-28 PROCEDURE — 6360000002 HC RX W HCPCS: Performed by: INTERNAL MEDICINE

## 2025-01-28 PROCEDURE — 93970 EXTREMITY STUDY: CPT | Performed by: SURGERY

## 2025-01-28 PROCEDURE — 51798 US URINE CAPACITY MEASURE: CPT

## 2025-01-28 PROCEDURE — 97535 SELF CARE MNGMENT TRAINING: CPT

## 2025-01-28 PROCEDURE — 82043 UR ALBUMIN QUANTITATIVE: CPT

## 2025-01-28 PROCEDURE — 94640 AIRWAY INHALATION TREATMENT: CPT

## 2025-01-28 PROCEDURE — 6360000002 HC RX W HCPCS: Performed by: FAMILY MEDICINE

## 2025-01-28 RX ORDER — POTASSIUM CHLORIDE 1500 MG/1
40 TABLET, EXTENDED RELEASE ORAL ONCE
Status: COMPLETED | OUTPATIENT
Start: 2025-01-28 | End: 2025-01-28

## 2025-01-28 RX ADMIN — ARFORMOTEROL TARTRATE: 15 SOLUTION RESPIRATORY (INHALATION) at 21:05

## 2025-01-28 RX ADMIN — FUROSEMIDE 20 MG: 10 INJECTION, SOLUTION INTRAMUSCULAR; INTRAVENOUS at 18:00

## 2025-01-28 RX ADMIN — FUROSEMIDE 20 MG: 10 INJECTION, SOLUTION INTRAMUSCULAR; INTRAVENOUS at 10:08

## 2025-01-28 RX ADMIN — ARFORMOTEROL TARTRATE: 15 SOLUTION RESPIRATORY (INHALATION) at 09:41

## 2025-01-28 RX ADMIN — POTASSIUM CHLORIDE 40 MEQ: 1500 TABLET, EXTENDED RELEASE ORAL at 10:07

## 2025-01-28 RX ADMIN — VALSARTAN 80 MG: 80 TABLET, FILM COATED ORAL at 10:08

## 2025-01-28 RX ADMIN — IRON SUCROSE 200 MG: 20 INJECTION, SOLUTION INTRAVENOUS at 13:15

## 2025-01-28 RX ADMIN — ENOXAPARIN SODIUM 40 MG: 100 INJECTION SUBCUTANEOUS at 10:07

## 2025-01-28 RX ADMIN — ASPIRIN 81 MG: 81 TABLET, CHEWABLE ORAL at 10:08

## 2025-01-28 RX ADMIN — SODIUM CHLORIDE, PRESERVATIVE FREE 10 ML: 5 INJECTION INTRAVENOUS at 10:08

## 2025-01-28 RX ADMIN — ACETAMINOPHEN 650 MG: 325 TABLET ORAL at 20:49

## 2025-01-28 RX ADMIN — ATORVASTATIN CALCIUM 40 MG: 40 TABLET, FILM COATED ORAL at 20:50

## 2025-01-28 RX ADMIN — SODIUM CHLORIDE, PRESERVATIVE FREE 10 ML: 5 INJECTION INTRAVENOUS at 20:49

## 2025-01-28 ASSESSMENT — PAIN SCALES - GENERAL
PAINLEVEL_OUTOF10: 3
PAINLEVEL_OUTOF10: 0

## 2025-01-28 ASSESSMENT — PAIN - FUNCTIONAL ASSESSMENT: PAIN_FUNCTIONAL_ASSESSMENT: PREVENTS OR INTERFERES SOME ACTIVE ACTIVITIES AND ADLS

## 2025-01-28 ASSESSMENT — PAIN DESCRIPTION - LOCATION: LOCATION: GENERALIZED

## 2025-01-28 ASSESSMENT — PAIN DESCRIPTION - PAIN TYPE: TYPE: ACUTE PAIN

## 2025-01-28 ASSESSMENT — PAIN DESCRIPTION - ONSET: ONSET: ON-GOING

## 2025-01-28 ASSESSMENT — PAIN DESCRIPTION - DESCRIPTORS: DESCRIPTORS: PATIENT UNABLE TO DESCRIBE

## 2025-01-28 ASSESSMENT — PAIN DESCRIPTION - FREQUENCY: FREQUENCY: CONTINUOUS

## 2025-01-28 NOTE — TELEPHONE ENCOUNTER
Call returned and spoke to Robina, (PT's spouse) advising what we recommend: 1) To ask if one of the nurses there can removed the sutures that were to come out tomorrow (she advised they are not to fond of this recommendation) and I advised to ask to speak to a nurse manager and ask if it could be done.  If they will not we can see him as late as Monday 2/3 and remove the sutures, no longer than that as it would be 3 weeks post surgery.  Robina understood info given but advised that it would difficult for her to get him here for suture removal, I advised again to try and get one of the nurses there to remove due to his health circumstances.  She advised she will try and let us know if this will work for them.

## 2025-01-28 NOTE — CARE COORDINATION
Case Management Assessment  Initial Evaluation    Date/Time of Evaluation: 1/28/2025 4:46 PM  Assessment Completed by: Rukhsana Mazariegos RN    If patient is discharged prior to next notation, then this note serves as note for discharge by case management.    Patient Name: Tommy Amato                   YOB: 1935  Diagnosis: Shortness of breath [R06.02]  Heart murmur [R01.1]  Heart failure (HCC) [I50.9]  Acute congestive heart failure, unspecified heart failure type (HCC) [I50.9]  Congestive heart failure, unspecified HF chronicity, unspecified heart failure type (HCC) [I50.9]  Acute cough [R05.1]                   Date / Time: 1/27/2025  3:03 PM    Patient Admission Status: Inpatient   Readmission Risk (Low < 19, Mod (19-27), High > 27): Readmission Risk Score: 13    Current PCP: Columba Oviedo MD  PCP verified by CM? Yes    Chart Reviewed: Yes      History Provided by: Patient  Patient Orientation: Alert and Oriented, Person, Place, Situation    Patient Cognition: Alert    Hospitalization in the last 30 days (Readmission):  No    If yes, Readmission Assessment in CM Navigator will be completed.    Advance Directives:      Code Status: Full Code   Patient's Primary Decision Maker is: Named in Scanned ACP Document    Primary Decision Maker: Robina Amato - Spouse - [phone]    Primary Decision Maker: jarrod poon - Child - [phone]    Discharge Planning:    Patient lives with: Spouse/Significant Other Type of Home: Independent Living  Primary Care Giver: Self  Patient Support Systems include: Spouse/Significant Other, Children, Family Members   Current Financial resources: Medicare  Current community resources: ECF/Home Care  Current services prior to admission: Durable Medical Equipment            Current DME: Cane, Walker            Type of Home Care services:  PT, OT, Aide Services    ADLS  Prior functional level: Independent in ADLs/IADLs  Current functional level: Independent in

## 2025-01-28 NOTE — TELEPHONE ENCOUNTER
Patient's spouse called to cxl pt's appt for tmrw 1/29 due to pt has been admitted to [facility] for congested heart failure. Pt's spouse wanted to know how to go about having patient's sutures removed on his scalp where surgery was done. Please c/b to advise

## 2025-01-28 NOTE — PLAN OF CARE
Problem: Discharge Planning  Goal: Discharge to home or other facility with appropriate resources  Outcome: Progressing     Problem: Confusion  Goal: Confusion, delirium, dementia, or psychosis is improved or at baseline  Description: INTERVENTIONS:  1. Assess for possible contributors to thought disturbance, including medications, impaired vision or hearing, underlying metabolic abnormalities, dehydration, psychiatric diagnoses, and notify attending LIP  2. [place] high risk fall precautions, as indicated  3. Provide frequent short contacts to provide reality reorientation, refocusing and direction  4. Decrease environmental stimuli, including noise as appropriate  5. Monitor and intervene to maintain adequate nutrition, hydration, elimination, sleep and activity  6. If unable to ensure safety without constant attention obtain sitter and review sitter guidelines with assigned personnel  7. Initiate Psychosocial CNS and Spiritual Care consult, as indicated  Outcome: Progressing     Problem: Safety - Adult  Goal: Free from fall injury  Outcome: Progressing   All fall precautions in place. Bed locked and in lowest position with alarm on. Overbed table and personal belonings within reach. Call light within reach and patient instructed to use call light for assistance. Non-skid socks on.    Problem: Neurosensory - Adult  Goal: Achieves maximal functionality and self care  Outcome: Progressing     Problem: Skin/Tissue Integrity - Adult  Goal: Skin integrity remains intact  Outcome: Progressing   Skin assessed this shift. [place] care completed as necessary. Patient alternating positions to reduce pressure and prevent skin injury q2 and as needed.     Problem: Gastrointestinal - Adult  Goal: Maintains adequate nutritional intake  Outcome: Progressing     Problem: Metabolic/Fluid and Electrolytes - Adult  Goal: Electrolytes maintained within normal limits  Outcome: Progressing  Goal: Hemodynamic stability and optimal

## 2025-01-28 NOTE — CONSULTS
[facility]      CONSULTATION  [phone]      Chief Complaint   Patient presents with    Shortness of Breath     Patient was sent here by PCP for shortness of breath x few weeks, AMS (short attention span), bilateral LE edema, congestion, denies N/V/D.       Reason for consult:     ASSESSMENT / PLAN:     Acute CHF, uncertain etiology, uncertain if systolic or diastolic (echo pending)  Elevated troponin, likely demand ischemia from CHF.  HTN  Iron deficiency anemia (likely due to poor absorption due to intestinal edema from CHF)    Plan  Patient needs aggressive IV diuresis - lasix @ 20 BID.  He is lasix naiive and has diuresed extremely well with this dose of lasix  Echo to evaluate etiology of CHF  GDMT - pending results of echo  Strict I/O, daily weights  Low sodium diet, limit to 2000 mg daily.  CHF education  Goal net negative 1-2 liters per day  Low threshold to convert to lasix drip  Closely monitor renal function, 'lytes, telemetry while aggressively IV diuresing.  Replace electrolytes as needed.  Maintain K+ close to 4.0 and Mg++ close to 2.0  Iron studies show significant iron deficiency (given CHF diagnosis, I.e. ferritin < 100 or ferritin < 300 with FeSat < 20%) - start IV iron x 5 days   Ischemic workup  - given advanced age will lean towards conservative approach unless significant EKG changes, worrisome ischemic symptoms, or significant findings on echo  No need to check further troponins unless new symptoms concerning for ischemia since they already peaked  If echo benign and no concerning symptoms will plan on stress test prior to discharge  Otherwise will consider C if having an invasive procedure is consistent with patient's wishes    Further recommendations pending results of clinical course and testing ordered.    Will continue to follow as requested by primary team.    HISTORY OF PRESENT ILLNESS:     Tommy Amato is a 89 y.o. patient who presents from home to ED for worsening

## 2025-01-28 NOTE — TELEPHONE ENCOUNTER
DOS01/13/2025 - RT superior Parietal scalp  [place]  - PT's wife Robina called to advise that PT was to have his sutures removed tomorrow at 3:00 but he has been admitted to [facility] due to Congestive Heart Failure.  The nurse at [facility] will remove the sutures but they need an order sent from Dr. Santos to remove the sutures.  I advised I will send the message to Dr. Santos for an order & advised if this can be done. Robina understood info given.     Called and spoke to PT's nurse, Lyly, advising Dr. Santos cannot write inpatient orders, to check to see if his treating DrEloisa ( Cardiologist - Dr. Rascon) will write the order for sutures to be removed jermaine.  Lyly advised, yes she will contact treating daryy to obtain an order for suture removal.   Dr. Santos advised.

## 2025-01-29 ENCOUNTER — APPOINTMENT (OUTPATIENT)
Age: 89
DRG: 280 | End: 2025-01-29
Attending: FAMILY MEDICINE
Payer: MEDICARE

## 2025-01-29 ENCOUNTER — APPOINTMENT (OUTPATIENT)
Dept: CT IMAGING | Age: 89
DRG: 280 | End: 2025-01-29
Payer: MEDICARE

## 2025-01-29 ENCOUNTER — TELEPHONE (OUTPATIENT)
Age: 89
End: 2025-01-29

## 2025-01-29 LAB
ALBUMIN SERPL-MCNC: 3.5 G/DL (ref 3.4–5)
ALBUMIN/GLOB SERPL: 1.5 {RATIO} (ref 1.1–2.2)
ALP SERPL-CCNC: 78 U/L (ref 40–129)
ALT SERPL-CCNC: 53 U/L (ref 10–40)
ANION GAP SERPL CALCULATED.3IONS-SCNC: 7 MMOL/L (ref 3–16)
ANTI-XA UNFRAC HEPARIN: 0.96 IU/ML (ref 0.3–0.7)
ANTI-XA UNFRAC HEPARIN: >1.1 IU/ML (ref 0.3–0.7)
APTT BLD: >180 SEC (ref 22.1–36.4)
AST SERPL-CCNC: 41 U/L (ref 15–37)
BILIRUB SERPL-MCNC: 0.7 MG/DL (ref 0–1)
BUN SERPL-MCNC: 32 MG/DL (ref 7–20)
CALCIUM SERPL-MCNC: 9.5 MG/DL (ref 8.3–10.6)
CHLORIDE SERPL-SCNC: 106 MMOL/L (ref 99–110)
CO2 SERPL-SCNC: 29 MMOL/L (ref 21–32)
CREAT SERPL-MCNC: 1.1 MG/DL (ref 0.8–1.3)
CREAT UR-MCNC: 23 MG/DL (ref 39–259)
DEPRECATED RDW RBC AUTO: 14.4 % (ref 12.4–15.4)
DEPRECATED RDW RBC AUTO: 14.7 % (ref 12.4–15.4)
ECHO AO ASC DIAM: 3.1 CM
ECHO AO ASCENDING AORTA INDEX: 1.68 CM/M2
ECHO AO ROOT DIAM: 3.1 CM
ECHO AO ROOT INDEX: 1.68 CM/M2
ECHO AV AREA PEAK VELOCITY: 1 CM2
ECHO AV AREA VTI: 1.1 CM2
ECHO AV AREA/BSA PEAK VELOCITY: 0.5 CM2/M2
ECHO AV AREA/BSA VTI: 0.6 CM2/M2
ECHO AV MEAN GRADIENT: 27 MMHG
ECHO AV MEAN VELOCITY: 2.5 M/S
ECHO AV PEAK GRADIENT: 44 MMHG
ECHO AV PEAK VELOCITY: 3.3 M/S
ECHO AV VELOCITY RATIO: 0.24
ECHO AV VTI: 68.6 CM
ECHO BSA: 1.86 M2
ECHO IVC INSP: 1.5 CM
ECHO IVC PROX: 1.9 CM
ECHO LA AREA 2C: 28.3 CM2
ECHO LA AREA 4C: 30 CM2
ECHO LA MAJOR AXIS: 7 CM
ECHO LA MINOR AXIS: 6.5 CM
ECHO LA VOL BP: 105 ML (ref 18–58)
ECHO LA VOL MOD A2C: 100 ML (ref 18–58)
ECHO LA VOL MOD A4C: 104 ML (ref 18–58)
ECHO LA VOL/BSA BIPLANE: 57 ML/M2 (ref 16–34)
ECHO LA VOLUME INDEX MOD A2C: 54 ML/M2 (ref 16–34)
ECHO LA VOLUME INDEX MOD A4C: 57 ML/M2 (ref 16–34)
ECHO LV E' LATERAL VELOCITY: 6.93 CM/S
ECHO LV E' SEPTAL VELOCITY: 4.31 CM/S
ECHO LV EDV A2C: 175 ML
ECHO LV EDV A4C: 144 ML
ECHO LV EDV INDEX A4C: 78 ML/M2
ECHO LV EDV NDEX A2C: 95 ML/M2
ECHO LV EJECTION FRACTION A2C: 34 %
ECHO LV EJECTION FRACTION A4C: 24 %
ECHO LV EJECTION FRACTION BIPLANE: 32 % (ref 55–100)
ECHO LV ESV A2C: 117 ML
ECHO LV ESV A4C: 109 ML
ECHO LV ESV INDEX A2C: 64 ML/M2
ECHO LV ESV INDEX A4C: 59 ML/M2
ECHO LV FRACTIONAL SHORTENING: 13 % (ref 28–44)
ECHO LV INTERNAL DIMENSION DIASTOLE INDEX: 2.93 CM/M2
ECHO LV INTERNAL DIMENSION DIASTOLIC: 5.4 CM (ref 4.2–5.9)
ECHO LV INTERNAL DIMENSION SYSTOLIC INDEX: 2.55 CM/M2
ECHO LV INTERNAL DIMENSION SYSTOLIC: 4.7 CM
ECHO LV IVSD: 0.8 CM (ref 0.6–1)
ECHO LV MASS 2D: 155 G (ref 88–224)
ECHO LV MASS INDEX 2D: 84.2 G/M2 (ref 49–115)
ECHO LV POSTERIOR WALL DIASTOLIC: 0.8 CM (ref 0.6–1)
ECHO LV RELATIVE WALL THICKNESS RATIO: 0.3
ECHO LVOT AREA: 4.2 CM2
ECHO LVOT AV VTI INDEX: 0.26
ECHO LVOT DIAM: 2.3 CM
ECHO LVOT MEAN GRADIENT: 1 MMHG
ECHO LVOT PEAK GRADIENT: 3 MMHG
ECHO LVOT PEAK VELOCITY: 0.8 M/S
ECHO LVOT STROKE VOLUME INDEX: 40.8 ML/M2
ECHO LVOT SV: 75.2 ML
ECHO LVOT VTI: 18.1 CM
ECHO MV A VELOCITY: 0.59 M/S
ECHO MV E DECELERATION TIME (DT): 106 MS
ECHO MV E VELOCITY: 0.79 M/S
ECHO MV E/A RATIO: 1.34
ECHO MV E/E' LATERAL: 11.4
ECHO MV E/E' RATIO (AVERAGED): 14.86
ECHO MV E/E' SEPTAL: 18.33
ECHO MV REGURGITANT PEAK GRADIENT: 135 MMHG
ECHO MV REGURGITANT PEAK VELOCITY: 5.8 M/S
ECHO MV REGURGITANT VTIA: 197 CM
ECHO PULMONARY ARTERY END DIASTOLIC PRESSURE: 14 MMHG
ECHO PV MAX VELOCITY: 0.6 M/S
ECHO PV PEAK GRADIENT: 1 MMHG
ECHO PV REGURGITANT MAX VELOCITY: 1.9 M/S
ECHO RA AREA 4C: 19.2 CM2
ECHO RA END SYSTOLIC VOLUME APICAL 4 CHAMBER INDEX BSA: 31 ML/M2
ECHO RA VOLUME: 57 ML
ECHO RV BASAL DIMENSION: 4.8 CM
ECHO RV FREE WALL PEAK S': 11.3 CM/S
ECHO RV LONGITUDINAL DIMENSION: 8 CM
ECHO RV MID DIMENSION: 2.4 CM
ECHO RV TAPSE: 1.7 CM (ref 1.7–?)
ECHO TV REGURGITANT MAX VELOCITY: 3.05 M/S
ECHO TV REGURGITANT PEAK GRADIENT: 37 MMHG
GFR SERPLBLD CREATININE-BSD FMLA CKD-EPI: 64 ML/MIN/{1.73_M2}
GLUCOSE SERPL-MCNC: 101 MG/DL (ref 70–99)
HCT VFR BLD AUTO: 36.4 % (ref 40.5–52.5)
HCT VFR BLD AUTO: 38.4 % (ref 40.5–52.5)
HGB BLD-MCNC: 12 G/DL (ref 13.5–17.5)
HGB BLD-MCNC: 12.5 G/DL (ref 13.5–17.5)
INR PPP: 1.36 (ref 0.85–1.15)
MAGNESIUM SERPL-MCNC: 2.1 MG/DL (ref 1.8–2.4)
MCH RBC QN AUTO: 31.6 PG (ref 26–34)
MCH RBC QN AUTO: 32 PG (ref 26–34)
MCHC RBC AUTO-ENTMCNC: 32.6 G/DL (ref 31–36)
MCHC RBC AUTO-ENTMCNC: 33.1 G/DL (ref 31–36)
MCV RBC AUTO: 96.7 FL (ref 80–100)
MCV RBC AUTO: 97.1 FL (ref 80–100)
MICROALBUMIN UR DL<=1MG/L-MCNC: <1.2 MG/DL
MICROALBUMIN/CREAT UR: ABNORMAL MG/G (ref 0–30)
NT-PROBNP SERPL-MCNC: 9191 PG/ML (ref 0–449)
PHOSPHATE SERPL-MCNC: 3.6 MG/DL (ref 2.5–4.9)
PLATELET # BLD AUTO: 177 K/UL (ref 135–450)
PLATELET # BLD AUTO: 200 K/UL (ref 135–450)
PMV BLD AUTO: 10.5 FL (ref 5–10.5)
PMV BLD AUTO: 11.3 FL (ref 5–10.5)
POTASSIUM SERPL-SCNC: 3.6 MMOL/L (ref 3.5–5.1)
PROT SERPL-MCNC: 5.8 G/DL (ref 6.4–8.2)
PROTHROMBIN TIME: 17 SEC (ref 11.9–14.9)
RBC # BLD AUTO: 3.76 M/UL (ref 4.2–5.9)
RBC # BLD AUTO: 3.95 M/UL (ref 4.2–5.9)
SODIUM SERPL-SCNC: 142 MMOL/L (ref 136–145)
WBC # BLD AUTO: 6.6 K/UL (ref 4–11)
WBC # BLD AUTO: 6.9 K/UL (ref 4–11)

## 2025-01-29 PROCEDURE — 2700000000 HC OXYGEN THERAPY PER DAY

## 2025-01-29 PROCEDURE — 94640 AIRWAY INHALATION TREATMENT: CPT

## 2025-01-29 PROCEDURE — 6360000002 HC RX W HCPCS: Performed by: INTERNAL MEDICINE

## 2025-01-29 PROCEDURE — 85730 THROMBOPLASTIN TIME PARTIAL: CPT

## 2025-01-29 PROCEDURE — C8929 TTE W OR WO FOL WCON,DOPPLER: HCPCS

## 2025-01-29 PROCEDURE — 97535 SELF CARE MNGMENT TRAINING: CPT

## 2025-01-29 PROCEDURE — 2500000003 HC RX 250 WO HCPCS: Performed by: FAMILY MEDICINE

## 2025-01-29 PROCEDURE — 85520 HEPARIN ASSAY: CPT

## 2025-01-29 PROCEDURE — 97530 THERAPEUTIC ACTIVITIES: CPT

## 2025-01-29 PROCEDURE — 6370000000 HC RX 637 (ALT 250 FOR IP)

## 2025-01-29 PROCEDURE — 80053 COMPREHEN METABOLIC PANEL: CPT

## 2025-01-29 PROCEDURE — 94761 N-INVAS EAR/PLS OXIMETRY MLT: CPT

## 2025-01-29 PROCEDURE — 97116 GAIT TRAINING THERAPY: CPT

## 2025-01-29 PROCEDURE — 83735 ASSAY OF MAGNESIUM: CPT

## 2025-01-29 PROCEDURE — 6360000004 HC RX CONTRAST MEDICATION: Performed by: FAMILY MEDICINE

## 2025-01-29 PROCEDURE — 85610 PROTHROMBIN TIME: CPT

## 2025-01-29 PROCEDURE — 6360000004 HC RX CONTRAST MEDICATION

## 2025-01-29 PROCEDURE — 6360000002 HC RX W HCPCS

## 2025-01-29 PROCEDURE — 6370000000 HC RX 637 (ALT 250 FOR IP): Performed by: FAMILY MEDICINE

## 2025-01-29 PROCEDURE — 1200000000 HC SEMI PRIVATE

## 2025-01-29 PROCEDURE — 85027 COMPLETE CBC AUTOMATED: CPT

## 2025-01-29 PROCEDURE — 6370000000 HC RX 637 (ALT 250 FOR IP): Performed by: INTERNAL MEDICINE

## 2025-01-29 PROCEDURE — 36415 COLL VENOUS BLD VENIPUNCTURE: CPT

## 2025-01-29 PROCEDURE — 6360000002 HC RX W HCPCS: Performed by: FAMILY MEDICINE

## 2025-01-29 PROCEDURE — 84100 ASSAY OF PHOSPHORUS: CPT

## 2025-01-29 PROCEDURE — 71260 CT THORAX DX C+: CPT

## 2025-01-29 PROCEDURE — 83880 ASSAY OF NATRIURETIC PEPTIDE: CPT

## 2025-01-29 RX ORDER — HEPARIN SODIUM 1000 [USP'U]/ML
80 INJECTION, SOLUTION INTRAVENOUS; SUBCUTANEOUS ONCE
Status: COMPLETED | OUTPATIENT
Start: 2025-01-29 | End: 2025-01-29

## 2025-01-29 RX ORDER — HEPARIN SODIUM 10000 [USP'U]/100ML
5-30 INJECTION, SOLUTION INTRAVENOUS CONTINUOUS
Status: DISCONTINUED | OUTPATIENT
Start: 2025-01-29 | End: 2025-01-30 | Stop reason: HOSPADM

## 2025-01-29 RX ORDER — FUROSEMIDE 10 MG/ML
40 INJECTION INTRAMUSCULAR; INTRAVENOUS 2 TIMES DAILY
Status: DISCONTINUED | OUTPATIENT
Start: 2025-01-29 | End: 2025-01-30

## 2025-01-29 RX ORDER — POTASSIUM CHLORIDE 1500 MG/1
40 TABLET, EXTENDED RELEASE ORAL ONCE
Status: COMPLETED | OUTPATIENT
Start: 2025-01-29 | End: 2025-01-29

## 2025-01-29 RX ORDER — HEPARIN SODIUM 1000 [USP'U]/ML
40 INJECTION, SOLUTION INTRAVENOUS; SUBCUTANEOUS PRN
Status: DISCONTINUED | OUTPATIENT
Start: 2025-01-29 | End: 2025-01-30 | Stop reason: HOSPADM

## 2025-01-29 RX ORDER — HEPARIN SODIUM 1000 [USP'U]/ML
80 INJECTION, SOLUTION INTRAVENOUS; SUBCUTANEOUS PRN
Status: DISCONTINUED | OUTPATIENT
Start: 2025-01-29 | End: 2025-01-30 | Stop reason: HOSPADM

## 2025-01-29 RX ORDER — IOPAMIDOL 755 MG/ML
75 INJECTION, SOLUTION INTRAVASCULAR
Status: COMPLETED | OUTPATIENT
Start: 2025-01-29 | End: 2025-01-29

## 2025-01-29 RX ORDER — CARVEDILOL 3.12 MG/1
3.12 TABLET ORAL 2 TIMES DAILY WITH MEALS
Status: DISCONTINUED | OUTPATIENT
Start: 2025-01-29 | End: 2025-01-30 | Stop reason: HOSPADM

## 2025-01-29 RX ADMIN — POTASSIUM CHLORIDE 40 MEQ: 1500 TABLET, EXTENDED RELEASE ORAL at 10:07

## 2025-01-29 RX ADMIN — FUROSEMIDE 40 MG: 10 INJECTION, SOLUTION INTRAMUSCULAR; INTRAVENOUS at 18:47

## 2025-01-29 RX ADMIN — CARVEDILOL 3.12 MG: 3.12 TABLET, FILM COATED ORAL at 18:47

## 2025-01-29 RX ADMIN — ATORVASTATIN CALCIUM 40 MG: 40 TABLET, FILM COATED ORAL at 21:24

## 2025-01-29 RX ADMIN — ENOXAPARIN SODIUM 40 MG: 100 INJECTION SUBCUTANEOUS at 10:07

## 2025-01-29 RX ADMIN — IRON SUCROSE 200 MG: 20 INJECTION, SOLUTION INTRAVENOUS at 14:21

## 2025-01-29 RX ADMIN — HEPARIN SODIUM 18 UNITS/KG/HR: 10000 INJECTION, SOLUTION INTRAVENOUS at 17:00

## 2025-01-29 RX ADMIN — SODIUM CHLORIDE, PRESERVATIVE FREE 10 ML: 5 INJECTION INTRAVENOUS at 10:11

## 2025-01-29 RX ADMIN — ARFORMOTEROL TARTRATE: 15 SOLUTION RESPIRATORY (INHALATION) at 20:08

## 2025-01-29 RX ADMIN — HEPARIN SODIUM 6000 UNITS: 1000 INJECTION INTRAVENOUS; SUBCUTANEOUS at 16:56

## 2025-01-29 RX ADMIN — SULFUR HEXAFLUORIDE 2 ML: 60.7; .19; .19 INJECTION, POWDER, LYOPHILIZED, FOR SUSPENSION INTRAVENOUS; INTRAVESICAL at 14:05

## 2025-01-29 RX ADMIN — IOPAMIDOL 75 ML: 755 INJECTION, SOLUTION INTRAVENOUS at 10:40

## 2025-01-29 RX ADMIN — FUROSEMIDE 20 MG: 10 INJECTION, SOLUTION INTRAMUSCULAR; INTRAVENOUS at 10:07

## 2025-01-29 RX ADMIN — ARFORMOTEROL TARTRATE: 15 SOLUTION RESPIRATORY (INHALATION) at 08:53

## 2025-01-29 RX ADMIN — ASPIRIN 81 MG: 81 TABLET, CHEWABLE ORAL at 10:07

## 2025-01-29 RX ADMIN — SODIUM CHLORIDE, PRESERVATIVE FREE 10 ML: 5 INJECTION INTRAVENOUS at 21:27

## 2025-01-29 RX ADMIN — CARVEDILOL 3.12 MG: 3.12 TABLET, FILM COATED ORAL at 10:07

## 2025-01-29 ASSESSMENT — PAIN SCALES - GENERAL: PAINLEVEL_OUTOF10: 0

## 2025-01-29 NOTE — PLAN OF CARE
D: SOB at rest and WALLER on minimal exertion of moving around in bed. Attempted to amb to BR last pm, but had audible wheezes and labored breathing when attempted to transfer OOB. Used purewick and condom catheter overnight. Sat dropped 83-90% on RA and o2 @ 2lpnc applied. Becomes very anxious at times d/t needing to void. Low light, bed alarm, and non skid socks on for safety. Emotional support and reassurance given.  A: Cont to monitor during hourly rounds    Problem: Confusion  Goal: Confusion, delirium, dementia, or psychosis is improved or at baseline  Description: INTERVENTIONS:  1. Assess for possible contributors to thought disturbance, including medications, impaired vision or hearing, underlying metabolic abnormalities, dehydration, psychiatric diagnoses, and notify attending LIP  2. [place] high risk fall precautions, as indicated  3. Provide frequent short contacts to provide reality reorientation, refocusing and direction  4. Decrease environmental stimuli, including noise as appropriate  5. Monitor and intervene to maintain adequate nutrition, hydration, elimination, sleep and activity  6. If unable to ensure safety without constant attention obtain sitter and review sitter guidelines with assigned personnel  7. Initiate Psychosocial CNS and Spiritual Care consult, as indicated  1/29/2025 0545 by Silvia Lee, RN  Outcome: Progressing  Flowsheets (Taken 1/28/2025 2000)  Effect of thought disturbance (confusion, delirium, dementia, or psychosis) are managed with adequate functional status:   Assess for contributors to thought disturbance, including medications, impaired vision or hearing, underlying metabolic abnormalities, dehydration, psychiatric diagnoses, notify LIP   [place] high risk fall precautions, as indicated   Provide frequent short contacts to provide reality reorientation, refocusing and direction   Decrease environmental stimuli, including noise as appropriate   Monitor and intervene

## 2025-01-29 NOTE — CARE COORDINATION
Millie Mckeon RDN (P [phone], fax [phone], email: [email or web address]) stopped by from "[employer] [facility] and she is familiar with [place] at [facility] as a rep from patient's insurance company ([employer]). They offer social workers and Nps for visits in the home. They can, also, help with services for home care if needed. Electronically signed by Dayan Tello RN on 1/29/2025 at 2:13 PM

## 2025-01-29 NOTE — PLAN OF CARE
Problem: Discharge Planning  Goal: Discharge to home or other facility with appropriate resources  Outcome: Progressing     Problem: Confusion  Goal: Confusion, delirium, dementia, or psychosis is improved or at baseline  Description: INTERVENTIONS:  1. Assess for possible contributors to thought disturbance, including medications, impaired vision or hearing, underlying metabolic abnormalities, dehydration, psychiatric diagnoses, and notify attending LIP  2. [place] high risk fall precautions, as indicated  3. Provide frequent short contacts to provide reality reorientation, refocusing and direction  4. Decrease environmental stimuli, including noise as appropriate  5. Monitor and intervene to maintain adequate nutrition, hydration, elimination, sleep and activity  6. If unable to ensure safety without constant attention obtain sitter and review sitter guidelines with assigned personnel  7. Initiate Psychosocial CNS and Spiritual Care consult, as indicated  Outcome: Progressing     Problem: Safety - Adult  Goal: Free from fall injury  Outcome: Progressing     Problem: Neurosensory - Adult  Goal: Achieves maximal functionality and self care  Outcome: Progressing     Problem: Skin/Tissue Integrity - Adult  Goal: Skin integrity remains intact  Outcome: Progressing     Problem: Gastrointestinal - Adult  Goal: Maintains adequate nutritional intake  Outcome: Progressing     Problem: Metabolic/Fluid and Electrolytes - Adult  Goal: Electrolytes maintained within normal limits  Outcome: Progressing  Goal: Hemodynamic stability and optimal renal function maintained  Outcome: Progressing     Problem: Genitourinary - Adult  Goal: Absence of urinary retention  Outcome: Progressing  Goal: Urinary catheter remains patent  Outcome: Progressing

## 2025-01-29 NOTE — CARE COORDINATION
Call from Mena and they will accept patient to [facility] at [facility]. Patient will need precert and Mena will start it tomorrow morning in anticipation of discharge. Called patient's wife and let her know that [facility] SNF acceoted and he will need insurance approval and she could walk over to SNF area of facility to see patient when he goes to [facility]. CM will continue to follow patient until discharge.  Electronically signed by Dayan Tello RN on 1/29/2025 at 4:00 PM

## 2025-01-29 NOTE — CARE COORDINATION
[facility] did not go through. Mena is reviewing case and will call back. [place] [facility] with wife and PT/OT have said recommendation SNF. CM will continue to follow patient until discharge. Electronically signed by Dayan Tello RN on 1/29/2025 at 1:58 PM

## 2025-01-29 NOTE — TELEPHONE ENCOUNTER
Robina and Don called in to ask to speak with you and go over patient results for recent testing.

## 2025-01-29 NOTE — DISCHARGE INSTRUCTIONS
Extra Heart Failure Education/ Tools/ Resources:     https://SuperDimension.com/publication/?d=326236   --- this is American Heart Association interactive Healthier Living with Heart Failure guidebook.  Please click hyperlink or copy / paste link into search bar. The QR Code is also available below. Use your mouse to scroll through the pages.  Lots of information about weight monitoring, diet tips, activity, meds, etc    Heart Failure Tools and Resources QR Code is below. It includes multiple resources to include symptom tracker, med tracker, further HF info, and access to a HF Support Network online Community    HF [place] Bryce  -- this is a free smart phone bryce available for iPhone and Android download.  Use your phone to track sodium / fluid intake, zone tool symptom tracking, weights, medications, etc. Click on this hyperlink  HF [place] Bryce   for QR code for easy download or the link is also found in the below HF Tools and Resources.      DASH (Dietary Approach to Stop Hypertension) diet --  https://www.nhlbi.nih.gov/education/dash-eating-plan -- this diet is a flexible eating plan that promotes heart healthy eating style.  Click on hyperlink or copy / paste link into search bar.  Lots of low sodium recipes and tips.    https://www.Picateers.Spontaneously/recipes  -- more free recipes

## 2025-01-30 ENCOUNTER — APPOINTMENT (OUTPATIENT)
Dept: VASCULAR LAB | Age: 89
DRG: 280 | End: 2025-01-30
Attending: INTERNAL MEDICINE
Payer: MEDICARE

## 2025-01-30 ENCOUNTER — TELEPHONE (OUTPATIENT)
Age: 89
End: 2025-01-30

## 2025-01-30 ENCOUNTER — HOSPITAL ENCOUNTER (INPATIENT)
Age: 89
LOS: 8 days | Discharge: HOME HEALTH | DRG: 321 | End: 2025-02-07
Attending: INTERNAL MEDICINE | Admitting: INTERNAL MEDICINE
Payer: MEDICARE

## 2025-01-30 DIAGNOSIS — I25.10 CAD (CORONARY ARTERY DISEASE): ICD-10-CM

## 2025-01-30 DIAGNOSIS — Z01.810 PREOP CARDIOVASCULAR EXAM: ICD-10-CM

## 2025-01-30 DIAGNOSIS — I35.0 SEVERE AORTIC STENOSIS: Primary | ICD-10-CM

## 2025-01-30 PROBLEM — R57.0 CARDIOGENIC SHOCK: Status: ACTIVE | Noted: 2025-01-30

## 2025-01-30 LAB
ALBUMIN SERPL-MCNC: 3.8 G/DL (ref 3.4–5)
ANION GAP SERPL CALCULATED.3IONS-SCNC: 8 MMOL/L (ref 3–16)
ANTI-XA UNFRAC HEPARIN: 0.32 IU/ML (ref 0.3–0.7)
ANTI-XA UNFRAC HEPARIN: 0.5 IU/ML (ref 0.3–0.7)
ANTI-XA UNFRAC HEPARIN: 0.54 IU/ML (ref 0.3–0.7)
BASOPHILS # BLD: 0 K/UL (ref 0–0.2)
BASOPHILS NFR BLD: 0.3 %
BUN SERPL-MCNC: 27 MG/DL (ref 7–20)
CALCIUM SERPL-MCNC: 9.6 MG/DL (ref 8.3–10.6)
CHLORIDE SERPL-SCNC: 102 MMOL/L (ref 99–110)
CO2 SERPL-SCNC: 32 MMOL/L (ref 21–32)
CREAT SERPL-MCNC: 1 MG/DL (ref 0.8–1.3)
DEPRECATED RDW RBC AUTO: 14.9 % (ref 12.4–15.4)
EOSINOPHIL # BLD: 0.1 K/UL (ref 0–0.6)
EOSINOPHIL NFR BLD: 1.3 %
GFR SERPLBLD CREATININE-BSD FMLA CKD-EPI: 72 ML/MIN/{1.73_M2}
GLUCOSE SERPL-MCNC: 90 MG/DL (ref 70–99)
HCT VFR BLD AUTO: 39.7 % (ref 40.5–52.5)
HGB BLD-MCNC: 13.2 G/DL (ref 13.5–17.5)
LYMPHOCYTES # BLD: 0.6 K/UL (ref 1–5.1)
LYMPHOCYTES NFR BLD: 8.2 %
MAGNESIUM SERPL-MCNC: 2.18 MG/DL (ref 1.8–2.4)
MCH RBC QN AUTO: 32 PG (ref 26–34)
MCHC RBC AUTO-ENTMCNC: 33.2 G/DL (ref 31–36)
MCV RBC AUTO: 96.4 FL (ref 80–100)
MONOCYTES # BLD: 0.7 K/UL (ref 0–1.3)
MONOCYTES NFR BLD: 9.3 %
NEUTROPHILS # BLD: 5.8 K/UL (ref 1.7–7.7)
NEUTROPHILS NFR BLD: 80.9 %
PHOSPHATE SERPL-MCNC: 3.2 MG/DL (ref 2.5–4.9)
PLATELET # BLD AUTO: 199 K/UL (ref 135–450)
PMV BLD AUTO: 10.6 FL (ref 5–10.5)
POC ACT LR: 239 SEC
POTASSIUM SERPL-SCNC: 3.4 MMOL/L (ref 3.5–5.1)
RBC # BLD AUTO: 4.12 M/UL (ref 4.2–5.9)
SODIUM SERPL-SCNC: 142 MMOL/L (ref 136–145)
WBC # BLD AUTO: 7.2 K/UL (ref 4–11)

## 2025-01-30 PROCEDURE — 7100000010 HC PHASE II RECOVERY - FIRST 15 MIN: Performed by: INTERNAL MEDICINE

## 2025-01-30 PROCEDURE — C1751 CATH, INF, PER/CENT/MIDLINE: HCPCS | Performed by: INTERNAL MEDICINE

## 2025-01-30 PROCEDURE — 2100000000 HC CCU R&B

## 2025-01-30 PROCEDURE — C1894 INTRO/SHEATH, NON-LASER: HCPCS | Performed by: INTERNAL MEDICINE

## 2025-01-30 PROCEDURE — 6360000004 HC RX CONTRAST MEDICATION: Performed by: INTERNAL MEDICINE

## 2025-01-30 PROCEDURE — 93460 R&L HRT ART/VENTRICLE ANGIO: CPT | Performed by: INTERNAL MEDICINE

## 2025-01-30 PROCEDURE — 6360000002 HC RX W HCPCS

## 2025-01-30 PROCEDURE — 94640 AIRWAY INHALATION TREATMENT: CPT

## 2025-01-30 PROCEDURE — C1887 CATHETER, GUIDING: HCPCS | Performed by: INTERNAL MEDICINE

## 2025-01-30 PROCEDURE — 85347 COAGULATION TIME ACTIVATED: CPT

## 2025-01-30 PROCEDURE — 93880 EXTRACRANIAL BILAT STUDY: CPT

## 2025-01-30 PROCEDURE — 97116 GAIT TRAINING THERAPY: CPT

## 2025-01-30 PROCEDURE — 83735 ASSAY OF MAGNESIUM: CPT

## 2025-01-30 PROCEDURE — 6370000000 HC RX 637 (ALT 250 FOR IP): Performed by: STUDENT IN AN ORGANIZED HEALTH CARE EDUCATION/TRAINING PROGRAM

## 2025-01-30 PROCEDURE — 85025 COMPLETE CBC W/AUTO DIFF WBC: CPT

## 2025-01-30 PROCEDURE — 97530 THERAPEUTIC ACTIVITIES: CPT

## 2025-01-30 PROCEDURE — 6370000000 HC RX 637 (ALT 250 FOR IP): Performed by: INTERNAL MEDICINE

## 2025-01-30 PROCEDURE — 2500000003 HC RX 250 WO HCPCS: Performed by: INTERNAL MEDICINE

## 2025-01-30 PROCEDURE — C1769 GUIDE WIRE: HCPCS | Performed by: INTERNAL MEDICINE

## 2025-01-30 PROCEDURE — 2709999900 HC NON-CHARGEABLE SUPPLY: Performed by: INTERNAL MEDICINE

## 2025-01-30 PROCEDURE — 6360000002 HC RX W HCPCS: Performed by: STUDENT IN AN ORGANIZED HEALTH CARE EDUCATION/TRAINING PROGRAM

## 2025-01-30 PROCEDURE — 2500000003 HC RX 250 WO HCPCS: Performed by: FAMILY MEDICINE

## 2025-01-30 PROCEDURE — 6360000002 HC RX W HCPCS: Performed by: INTERNAL MEDICINE

## 2025-01-30 PROCEDURE — 85520 HEPARIN ASSAY: CPT

## 2025-01-30 PROCEDURE — 36415 COLL VENOUS BLD VENIPUNCTURE: CPT

## 2025-01-30 PROCEDURE — 80069 RENAL FUNCTION PANEL: CPT

## 2025-01-30 PROCEDURE — 7100000011 HC PHASE II RECOVERY - ADDTL 15 MIN: Performed by: INTERNAL MEDICINE

## 2025-01-30 PROCEDURE — 2580000003 HC RX 258: Performed by: INTERNAL MEDICINE

## 2025-01-30 PROCEDURE — 6370000000 HC RX 637 (ALT 250 FOR IP): Performed by: FAMILY MEDICINE

## 2025-01-30 RX ORDER — CARVEDILOL 3.12 MG/1
3.12 TABLET ORAL 2 TIMES DAILY WITH MEALS
Status: DISCONTINUED | OUTPATIENT
Start: 2025-01-31 | End: 2025-02-01

## 2025-01-30 RX ORDER — HEPARIN SODIUM 1000 [USP'U]/ML
4000 INJECTION, SOLUTION INTRAVENOUS; SUBCUTANEOUS PRN
Status: DISCONTINUED | OUTPATIENT
Start: 2025-01-30 | End: 2025-02-06

## 2025-01-30 RX ORDER — POTASSIUM CHLORIDE 7.45 MG/ML
10 INJECTION INTRAVENOUS
Status: DISPENSED | OUTPATIENT
Start: 2025-01-30 | End: 2025-01-30

## 2025-01-30 RX ORDER — HEPARIN SODIUM 1000 [USP'U]/ML
4000 INJECTION, SOLUTION INTRAVENOUS; SUBCUTANEOUS PRN
Status: DISCONTINUED | OUTPATIENT
Start: 2025-01-30 | End: 2025-01-30 | Stop reason: SDUPTHER

## 2025-01-30 RX ORDER — VERAPAMIL HYDROCHLORIDE 2.5 MG/ML
INJECTION, SOLUTION INTRAVENOUS PRN
Status: DISCONTINUED | OUTPATIENT
Start: 2025-01-30 | End: 2025-01-30 | Stop reason: HOSPADM

## 2025-01-30 RX ORDER — ONDANSETRON 2 MG/ML
4 INJECTION INTRAMUSCULAR; INTRAVENOUS EVERY 6 HOURS PRN
Status: DISCONTINUED | OUTPATIENT
Start: 2025-01-30 | End: 2025-02-07 | Stop reason: HOSPADM

## 2025-01-30 RX ORDER — LIDOCAINE HYDROCHLORIDE 10 MG/ML
INJECTION, SOLUTION EPIDURAL; INFILTRATION; INTRACAUDAL; PERINEURAL PRN
Status: DISCONTINUED | OUTPATIENT
Start: 2025-01-30 | End: 2025-01-30 | Stop reason: HOSPADM

## 2025-01-30 RX ORDER — HEPARIN SODIUM 1000 [USP'U]/ML
INJECTION, SOLUTION INTRAVENOUS; SUBCUTANEOUS PRN
Status: DISCONTINUED | OUTPATIENT
Start: 2025-01-30 | End: 2025-01-30 | Stop reason: HOSPADM

## 2025-01-30 RX ORDER — ACETAMINOPHEN 325 MG/1
650 TABLET ORAL EVERY 6 HOURS PRN
Status: DISCONTINUED | OUTPATIENT
Start: 2025-01-30 | End: 2025-02-03

## 2025-01-30 RX ORDER — HEPARIN SODIUM 10000 [USP'U]/100ML
5-30 INJECTION, SOLUTION INTRAVENOUS CONTINUOUS
Status: DISCONTINUED | OUTPATIENT
Start: 2025-01-30 | End: 2025-01-30 | Stop reason: SDUPTHER

## 2025-01-30 RX ORDER — HEPARIN SODIUM 10000 [USP'U]/100ML
5-30 INJECTION, SOLUTION INTRAVENOUS CONTINUOUS
Status: DISCONTINUED | OUTPATIENT
Start: 2025-01-30 | End: 2025-02-06

## 2025-01-30 RX ORDER — ATORVASTATIN CALCIUM 80 MG/1
80 TABLET, FILM COATED ORAL NIGHTLY
Status: DISCONTINUED | OUTPATIENT
Start: 2025-01-30 | End: 2025-02-07 | Stop reason: HOSPADM

## 2025-01-30 RX ORDER — CLOPIDOGREL BISULFATE 75 MG/1
75 TABLET ORAL DAILY
Status: DISCONTINUED | OUTPATIENT
Start: 2025-01-31 | End: 2025-02-03

## 2025-01-30 RX ORDER — CLOPIDOGREL BISULFATE 75 MG/1
300 TABLET ORAL ONCE
Status: COMPLETED | OUTPATIENT
Start: 2025-01-30 | End: 2025-01-30

## 2025-01-30 RX ORDER — DOBUTAMINE HYDROCHLORIDE 200 MG/100ML
2.5-1 INJECTION INTRAVENOUS CONTINUOUS
Status: DISCONTINUED | OUTPATIENT
Start: 2025-01-30 | End: 2025-01-31

## 2025-01-30 RX ORDER — IOPAMIDOL 755 MG/ML
INJECTION, SOLUTION INTRAVASCULAR PRN
Status: DISCONTINUED | OUTPATIENT
Start: 2025-01-30 | End: 2025-01-30 | Stop reason: HOSPADM

## 2025-01-30 RX ORDER — HEPARIN SODIUM 1000 [USP'U]/ML
2000 INJECTION, SOLUTION INTRAVENOUS; SUBCUTANEOUS PRN
Status: DISCONTINUED | OUTPATIENT
Start: 2025-01-30 | End: 2025-01-30 | Stop reason: SDUPTHER

## 2025-01-30 RX ORDER — HEPARIN SODIUM 1000 [USP'U]/ML
4000 INJECTION, SOLUTION INTRAVENOUS; SUBCUTANEOUS ONCE
Status: DISCONTINUED | OUTPATIENT
Start: 2025-01-30 | End: 2025-01-30 | Stop reason: SDUPTHER

## 2025-01-30 RX ORDER — DOBUTAMINE HYDROCHLORIDE 200 MG/100ML
2.5 INJECTION INTRAVENOUS CONTINUOUS
Status: DISCONTINUED | OUTPATIENT
Start: 2025-01-30 | End: 2025-01-30 | Stop reason: HOSPADM

## 2025-01-30 RX ORDER — ACETAMINOPHEN 650 MG/1
650 SUPPOSITORY RECTAL EVERY 6 HOURS PRN
Status: DISCONTINUED | OUTPATIENT
Start: 2025-01-30 | End: 2025-02-07 | Stop reason: HOSPADM

## 2025-01-30 RX ORDER — NITROGLYCERIN 20 MG/100ML
INJECTION INTRAVENOUS PRN
Status: DISCONTINUED | OUTPATIENT
Start: 2025-01-30 | End: 2025-01-30 | Stop reason: HOSPADM

## 2025-01-30 RX ORDER — ASPIRIN 81 MG/1
TABLET, CHEWABLE ORAL PRN
Status: DISCONTINUED | OUTPATIENT
Start: 2025-01-30 | End: 2025-01-30 | Stop reason: HOSPADM

## 2025-01-30 RX ORDER — HEPARIN SODIUM 1000 [USP'U]/ML
2000 INJECTION, SOLUTION INTRAVENOUS; SUBCUTANEOUS PRN
Status: DISCONTINUED | OUTPATIENT
Start: 2025-01-30 | End: 2025-02-06

## 2025-01-30 RX ORDER — ASPIRIN 81 MG/1
81 TABLET, CHEWABLE ORAL DAILY
Status: DISCONTINUED | OUTPATIENT
Start: 2025-01-31 | End: 2025-02-06

## 2025-01-30 RX ORDER — CLOPIDOGREL BISULFATE 75 MG/1
75 TABLET ORAL DAILY
Status: DISCONTINUED | OUTPATIENT
Start: 2025-01-31 | End: 2025-01-30 | Stop reason: HOSPADM

## 2025-01-30 RX ADMIN — POTASSIUM CHLORIDE 10 MEQ: 10 INJECTION, SOLUTION INTRAVENOUS at 08:49

## 2025-01-30 RX ADMIN — ATORVASTATIN CALCIUM 80 MG: 80 TABLET, FILM COATED ORAL at 22:38

## 2025-01-30 RX ADMIN — HEPARIN SODIUM 16 UNITS/KG/HR: 10000 INJECTION, SOLUTION INTRAVENOUS at 22:34

## 2025-01-30 RX ADMIN — HEPARIN SODIUM 16 UNITS/KG/HR: 10000 INJECTION, SOLUTION INTRAVENOUS at 16:01

## 2025-01-30 RX ADMIN — DOBUTAMINE IN DEXTROSE 2.5 MCG/KG/MIN: 200 INJECTION, SOLUTION INTRAVENOUS at 15:57

## 2025-01-30 RX ADMIN — FUROSEMIDE 40 MG: 10 INJECTION, SOLUTION INTRAMUSCULAR; INTRAVENOUS at 08:26

## 2025-01-30 RX ADMIN — CARVEDILOL 3.12 MG: 3.12 TABLET, FILM COATED ORAL at 08:27

## 2025-01-30 RX ADMIN — ASPIRIN 81 MG: 81 TABLET, CHEWABLE ORAL at 08:27

## 2025-01-30 RX ADMIN — FUROSEMIDE 5 MG/HR: 10 INJECTION, SOLUTION INTRAVENOUS at 15:59

## 2025-01-30 RX ADMIN — CLOPIDOGREL BISULFATE 300 MG: 75 TABLET ORAL at 16:56

## 2025-01-30 RX ADMIN — CARVEDILOL 3.12 MG: 3.12 TABLET, FILM COATED ORAL at 16:56

## 2025-01-30 RX ADMIN — IRON SUCROSE 200 MG: 20 INJECTION, SOLUTION INTRAVENOUS at 11:23

## 2025-01-30 RX ADMIN — SODIUM CHLORIDE, PRESERVATIVE FREE 10 ML: 5 INJECTION INTRAVENOUS at 08:27

## 2025-01-30 RX ADMIN — POTASSIUM CHLORIDE 10 MEQ: 10 INJECTION, SOLUTION INTRAVENOUS at 11:27

## 2025-01-30 RX ADMIN — ARFORMOTEROL TARTRATE: 15 SOLUTION RESPIRATORY (INHALATION) at 08:00

## 2025-01-30 NOTE — CONSULTS
ICU CONSULT       PCP:  Columba Oviedo MD          Admit Date:  1/27/2025                            Hospital Day:3  ICU Day: 1      CC: Shortness of breath  Reason for consult:ICU management until transfer  History obtained from:  chart review and family    SUBJECTIVE   Interval history    89M with severe aortic stenosis, coronary artery disease with new onset HF. He is getting transferred to [facility] to get evaluated by CT Surgery for TAVR + PCI vs AVR + CABG. He just had left heart cath to evaluate coronary vessels which showed severe CAD involving ostial and mid LAD and low cardiac output. He was started on hep gtt. Also on dobutamine gtt. He has RLE DVT on US without PE on CTA. Family at bedside. Updated on transfer. Patient denies pain. Denies NV. NPO, getting food right now as no surgical interventions planned for tonight. No BM last 3 days. Voiding with lasix gtt for volume overload. Denies FC. Denies CP and SOB. Will monitor in ICU until he is transported via ambulance to [facility].      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  Patient reported shortness of breath on exertion and cannot tolerate walking at least 1 block.  During this time, he endorsed both inspiratory and expiratory wheezing.  He denied any chest pain.  But shortness of breath resolves with rest.  He reported orthopnea during this time, but inability to lay down on flat surface due to feelings of suffocation.  He has had bilateral lower extremity edema which is new.  Patient initially presented to PCP who prescribed antibiotics and inhalers for wheezing, but symptoms did not improve.  He came to the ER for further evaluation.     ER course  Initial vitals were stable, on room

## 2025-01-30 NOTE — CARE COORDINATION
CM  following for  d/c planning    Patient from  [facility] at [facility]: ( moved in about a week ago w/ his wife.)    Out Patient  Services: Millie Mckeon RDN   P [phone],   Fax [phone],   Email: [email or web address])   -  from [facility] and she is familiar with [place] at [facility] as a rep from patient's insurance company ([employer] [place]).   - They offer social workers and NPs for visits in the home.  -  They can, also, help with services for home care if needed.    Patient plans to  d/c  to [facility]    Services Available   Skilled Nursing      Address   [address] [place] [id number]             Contact Information    [phone]        REPORT:  [phone]  Fax:  [phone]      Pre cert  pending : [employer] [place]:    Polly  to update  CM  once recv'd.    S/P Echo Plan  angiogram today .     Electronically signed by Rukhsana Mazariegos RN on 1/30/2025 at 11:03 AM       Rukhsana Mazariegos RN Case Manager  [facility]  [address]  [facility] [address] [phone]  Fax [phone]

## 2025-01-30 NOTE — PLAN OF CARE
Problem: Discharge Planning  Goal: Discharge to home or other facility with appropriate resources  Outcome: Progressing     Problem: Confusion  Goal: Confusion, delirium, dementia, or psychosis is improved or at baseline  Description: INTERVENTIONS:  1. Assess for possible contributors to thought disturbance, including medications, impaired vision or hearing, underlying metabolic abnormalities, dehydration, psychiatric diagnoses, and notify attending LIP  2. [place] high risk fall precautions, as indicated  3. Provide frequent short contacts to provide reality reorientation, refocusing and direction  4. Decrease environmental stimuli, including noise as appropriate  5. Monitor and intervene to maintain adequate nutrition, hydration, elimination, sleep and activity  6. If unable to ensure safety without constant attention obtain sitter and review sitter guidelines with assigned personnel  7. Initiate Psychosocial CNS and Spiritual Care consult, as indicated  Outcome: Progressing     Problem: Safety - Adult  Goal: Free from fall injury  Outcome: Progressing     Problem: Neurosensory - Adult  Goal: Achieves maximal functionality and self care  Outcome: Progressing     Problem: Skin/Tissue Integrity - Adult  Goal: Skin integrity remains intact  Description: 1.  Monitor for areas of redness and/or skin breakdown  2.  Assess vascular access sites hourly  3.  Every 4-6 hours minimum:  Change oxygen saturation probe site  4.  Every 4-6 hours:  If on nasal continuous positive airway pressure, respiratory therapy assess nares and determine need for appliance change or resting period  Outcome: Progressing     Problem: Gastrointestinal - Adult  Goal: Maintains adequate nutritional intake  Outcome: Progressing     Problem: Metabolic/Fluid and Electrolytes - Adult  Goal: Electrolytes maintained within normal limits  Outcome: Progressing  Goal: Hemodynamic stability and optimal renal function maintained  Outcome: Progressing

## 2025-01-30 NOTE — TELEPHONE ENCOUNTER
Pt's wife is inquiring about the results of this ECHO (Robina: [phone]):        Echo (TTE) complete with contrast    Patient Name: Tommy Amato \"Don\"   Patient MRN: [id number]   Patient : 1935 (89 y.o.)   Legal Sex: Male   Height: 1.676 m (5' 6\")   Weight: 74.4 kg (164 lb)   BSA: 1.86 m²   Blood Pressure: 124/63    Accession Number: [id number]   Date of Study: 2025 ( 2:05 PM)   Ordering Provider: Daniel Meléndez MD   Clinical Indications: Congestive heart failure, Shortness of breath       Reading Physicians  Performing Staff   Cardiology: Wagner Landon MD    Tech/Nurse: Ricardo Uriarte        Reason for Exam  Priority: Routine  Congestive heart failure; Shortness of breath   Dx: Congestive heart failure, unspecified HF chronicity, unspecified heart failure type (HCC) [I50.9 (ICD-10-CM)]; Shortness of breath [R06.02 (ICD-10-CM)]     PACS Images     Show images for Echo (TTE) complete (PRN contrast/bubble/strain/3D)  Interpretation Summary  Show Result Comparison     Left Ventricle: Severely reduced left ventricular systolic function with a visually estimated EF of 30 - 35%. Left ventricle is dilated. Normal wall thickness. Global hypokinesis present. Grade II diastolic dysfunction with increased LAP.    Right Ventricle: Normal systolic function.    Aortic Valve: Severely calcified cusps. Mild regurgitation. Possible low flow/low gradient severe aortic stenosis with low EF. AV mean gradient is 27 mmHg. AV area by continuity VTI is 1.1 cm2. SV index 29.    Mitral Valve: Moderate regurgitation.    Tricuspid Valve: Mild to moderate regurgitation.    Pulmonic Valve: Moderate regurgitation.    Left Atrium: Left atrium is severely dilated. Left atrial volume index is severely increased (>48 mL/m2).    Right Atrium: Right atrium is mildly dilated.    Image quality is adequate. Contrast used: Lumason. Procedure performed with the patient in a supine position.     Echo Findings    Left Ventricle

## 2025-01-30 NOTE — PLAN OF CARE
Problem: Discharge Planning  Goal: Discharge to home or other facility with appropriate resources  1/30/2025 0544 by Magalie Correia RN  Outcome: Progressing     Problem: Confusion  Goal: Confusion, delirium, dementia, or psychosis is improved or at baseline  Description: INTERVENTIONS:  1. Assess for possible contributors to thought disturbance, including medications, impaired vision or hearing, underlying metabolic abnormalities, dehydration, psychiatric diagnoses, and notify attending LIP  2. [place] high risk fall precautions, as indicated  3. Provide frequent short contacts to provide reality reorientation, refocusing and direction  4. Decrease environmental stimuli, including noise as appropriate  5. Monitor and intervene to maintain adequate nutrition, hydration, elimination, sleep and activity  6. If unable to ensure safety without constant attention obtain sitter and review sitter guidelines with assigned personnel  7. Initiate Psychosocial CNS and Spiritual Care consult, as indicated  1/30/2025 0544 by Magalie Correia RN  Outcome: Progressing     Problem: Safety - Adult  Goal: Free from fall injury  1/30/2025 0544 by Magalie Correia RN  Outcome: Progressing  Note: Bed locked and in lowest position. Bed alarm on. Call light within reach. Video monitoring in use.      Problem: Skin/Tissue Integrity - Adult  Goal: Skin integrity remains intact  Description: 1.  Monitor for areas of redness and/or skin breakdown  2.  Assess vascular access sites hourly  3.  Every 4-6 hours minimum:  Change oxygen saturation probe site  4.  Every 4-6 hours:  If on nasal continuous positive airway pressure, respiratory therapy assess nares and determine need for appliance change or resting period  1/30/2025 0544 by Magalie Correia RN  Outcome: Progressing     Problem: Gastrointestinal - Adult  Goal: Maintains adequate nutritional intake  1/30/2025 0544 by Magalie Correia RN  Outcome: Progressing     Problem: Skin/Tissue

## 2025-01-30 NOTE — SEDATION DOCUMENTATION
Sedation Pre-Procedure Note    Patient Name: Tommy Amato   YOB: 1935  Room/Bed: TJHZ-IR/PL  Medical Record Number: [id number]  Date: 1/30/2025   Time: 12:55 PM       Indication: Left ventricular dysfunction, cardiomyopathy, aortic stenosis    Consent: I have discussed with the patient and/or the patient representative the indication, alternatives, and the possible risks and/or complications of the planned procedure and the anesthesia methods.  Risks including MI, cardiac failure, cardiac arrest and other fatal and nonfatal dysrhythmias, excessive bleeding, stroke and others discussed at length with the patient the patient and/or patient representative appear to understand and agree to proceed.    Vital Signs:   Vitals:    01/30/25 1100   BP: (!) 146/90   Pulse: 70   Resp: 18   Temp: 97.4 °F (36.3 °C)   SpO2: 97%       Past Medical History:   has a past medical history of Angioma, Arthritis, Bunion, Burn, Cataract, Cerebellar infarct (HCC), Deep vein thrombosis (HCC), Essential hypertension, benign, Ganglion cyst, Hammer toe, Heart failure (HCC), Hyperlipidemia, Inguinal hernia, Major depressive disorder, recurrent, mild, and Primary osteoarthritis of right knee.    Past Surgical History:   has a past surgical history that includes hernia repair (1980); Varicose vein surgery (2008); Varicose vein surgery; Dental surgery; Inguinal hernia repair; Inguinal hernia repair (08/24/2011); eye surgery; Cataract removal (Bilateral, May '14); Dental surgery (03/04/2020); Hand surgery; and Mohs surgery (Left, 05/23/2024).    Medications:   Scheduled Meds:    potassium chloride  10 mEq IntraVENous Q1H    carvedilol  3.125 mg Oral BID WC    iron sucrose  200 mg IntraVENous Q24H    sodium chloride flush  5-40 mL IntraVENous 2 times per day    aspirin  81 mg Oral Daily    atorvastatin  40 mg Oral Nightly    arformoterol 15 mcg-budesonide 0.25 mg neb solution   Nebulization BID RT    [Held by provider] valsartan  80

## 2025-01-31 ENCOUNTER — APPOINTMENT (OUTPATIENT)
Dept: CT IMAGING | Age: 89
DRG: 321 | End: 2025-01-31
Attending: INTERNAL MEDICINE
Payer: MEDICARE

## 2025-01-31 ENCOUNTER — APPOINTMENT (OUTPATIENT)
Dept: VASCULAR LAB | Age: 89
DRG: 321 | End: 2025-01-31
Attending: INTERNAL MEDICINE
Payer: MEDICARE

## 2025-01-31 VITALS
WEIGHT: 163.14 LBS | BODY MASS INDEX: 26.22 KG/M2 | DIASTOLIC BLOOD PRESSURE: 68 MMHG | SYSTOLIC BLOOD PRESSURE: 101 MMHG | HEIGHT: 66 IN | HEART RATE: 78 BPM | RESPIRATION RATE: 16 BRPM | TEMPERATURE: 96.8 F | OXYGEN SATURATION: 96 %

## 2025-01-31 PROBLEM — I82.409 ACUTE DVT (DEEP VENOUS THROMBOSIS) (HCC): Status: ACTIVE | Noted: 2025-01-31

## 2025-01-31 PROBLEM — I50.20 HFREF (HEART FAILURE WITH REDUCED EJECTION FRACTION) (HCC): Status: ACTIVE | Noted: 2025-01-27

## 2025-01-31 PROBLEM — I21.4 NSTEMI (NON-ST ELEVATED MYOCARDIAL INFARCTION) (HCC): Status: ACTIVE | Noted: 2025-01-31

## 2025-01-31 LAB
ANION GAP SERPL CALCULATED.3IONS-SCNC: 11 MMOL/L (ref 3–16)
ANTI-XA UNFRAC HEPARIN: 0.27 IU/ML (ref 0.3–0.7)
ANTI-XA UNFRAC HEPARIN: 0.56 IU/ML (ref 0.3–0.7)
ANTI-XA UNFRAC HEPARIN: 0.92 IU/ML (ref 0.3–0.7)
APTT BLD: 60.1 SEC (ref 22.1–36.4)
BUN SERPL-MCNC: 24 MG/DL (ref 7–20)
CALCIUM SERPL-MCNC: 9.5 MG/DL (ref 8.3–10.6)
CHLORIDE SERPL-SCNC: 101 MMOL/L (ref 99–110)
CO2 SERPL-SCNC: 29 MMOL/L (ref 21–32)
CREAT SERPL-MCNC: 0.9 MG/DL (ref 0.8–1.3)
DEPRECATED RDW RBC AUTO: 15.1 % (ref 12.4–15.4)
ECHO BSA: 1.86 M2
ECHO BSA: 1.86 M2
GFR SERPLBLD CREATININE-BSD FMLA CKD-EPI: 82 ML/MIN/{1.73_M2}
GLUCOSE SERPL-MCNC: 94 MG/DL (ref 70–99)
HCT VFR BLD AUTO: 40.7 % (ref 40.5–52.5)
HGB BLD-MCNC: 13.4 G/DL (ref 13.5–17.5)
INR PPP: 1.2 (ref 0.85–1.15)
MAGNESIUM SERPL-MCNC: 2.07 MG/DL (ref 1.8–2.4)
MCH RBC QN AUTO: 31.4 PG (ref 26–34)
MCHC RBC AUTO-ENTMCNC: 32.8 G/DL (ref 31–36)
MCV RBC AUTO: 95.6 FL (ref 80–100)
PLATELET # BLD AUTO: 190 K/UL (ref 135–450)
PMV BLD AUTO: 10.2 FL (ref 5–10.5)
POTASSIUM SERPL-SCNC: 3.1 MMOL/L (ref 3.5–5.1)
POTASSIUM SERPL-SCNC: 3.8 MMOL/L (ref 3.5–5.1)
PROTHROMBIN TIME: 15.4 SEC (ref 11.9–14.9)
RBC # BLD AUTO: 4.25 M/UL (ref 4.2–5.9)
SODIUM SERPL-SCNC: 141 MMOL/L (ref 136–145)
VAS LEFT CCA DIST EDV: 11.2 CM/S
VAS LEFT CCA DIST EDV: 5.3 CM/S
VAS LEFT CCA DIST PSV: 35 CM/S
VAS LEFT CCA DIST PSV: 39.1 CM/S
VAS LEFT CCA MID EDV: 12.4 CM/S
VAS LEFT CCA MID EDV: 8.06 CM/S
VAS LEFT CCA MID PSV: 40.4 CM/S
VAS LEFT CCA MID PSV: 46.9 CM/S
VAS LEFT CCA PROX EDV: 13 CM/S
VAS LEFT CCA PROX EDV: 7.4 CM/S
VAS LEFT CCA PROX PSV: 37.9 CM/S
VAS LEFT CCA PROX PSV: 54.8 CM/S
VAS LEFT ECA EDV: 0 CM/S
VAS LEFT ECA PSV: 33.3 CM/S
VAS LEFT ECA PSV: 34 CM/S
VAS LEFT ICA DIST EDV: 13 CM/S
VAS LEFT ICA DIST EDV: 8.6 CM/S
VAS LEFT ICA DIST PSV: 43 CM/S
VAS LEFT ICA DIST PSV: 46.1 CM/S
VAS LEFT ICA MID EDV: 8.1 CM/S
VAS LEFT ICA MID EDV: 8.2 CM/S
VAS LEFT ICA MID PSV: 38.9 CM/S
VAS LEFT ICA MID PSV: 47.2 CM/S
VAS LEFT ICA PROX EDV: 10.5 CM/S
VAS LEFT ICA PROX EDV: 7.6 CM/S
VAS LEFT ICA PROX PSV: 24.7 CM/S
VAS LEFT ICA PROX PSV: 55.3 CM/S
VAS LEFT ICA/CCA PSV: 0.61
VAS LEFT ICA/CCA PSV: 1.58
VAS LEFT SUBCLAVIAN PROX PSV: 40.3 CM/S
VAS LEFT SUBCLAVIAN PROX PSV: 65.4 CM/S
VAS LEFT VERTEBRAL EDV: 11.8 CM/S
VAS LEFT VERTEBRAL EDV: 9.46 CM/S
VAS LEFT VERTEBRAL PSV: 34.7 CM/S
VAS LEFT VERTEBRAL PSV: 47.8 CM/S
VAS RIGHT CCA DIST EDV: 7.9 CM/S
VAS RIGHT CCA DIST EDV: 9.9 CM/S
VAS RIGHT CCA DIST PSV: 31.7 CM/S
VAS RIGHT CCA DIST PSV: 44.8 CM/S
VAS RIGHT CCA MID EDV: 10.1 CM/S
VAS RIGHT CCA MID EDV: 11.2 CM/S
VAS RIGHT CCA MID PSV: 46.5 CM/S
VAS RIGHT CCA MID PSV: 47.2 CM/S
VAS RIGHT CCA PROX EDV: 13 CM/S
VAS RIGHT CCA PROX EDV: 9.2 CM/S
VAS RIGHT CCA PROX PSV: 54 CM/S
VAS RIGHT CCA PROX PSV: 62.3 CM/S
VAS RIGHT ECA EDV: 0 CM/S
VAS RIGHT ECA PSV: 30 CM/S
VAS RIGHT ECA PSV: 30.7 CM/S
VAS RIGHT ICA DIST EDV: 15.4 CM/S
VAS RIGHT ICA DIST EDV: 15.8 CM/S
VAS RIGHT ICA DIST PSV: 48 CM/S
VAS RIGHT ICA DIST PSV: 51.1 CM/S
VAS RIGHT ICA MID EDV: 11.2 CM/S
VAS RIGHT ICA MID EDV: 4.7 CM/S
VAS RIGHT ICA MID PSV: 27.7 CM/S
VAS RIGHT ICA MID PSV: 33.6 CM/S
VAS RIGHT ICA PROX EDV: 7.2 CM/S
VAS RIGHT ICA PROX EDV: 8.8 CM/S
VAS RIGHT ICA PROX PSV: 26.3 CM/S
VAS RIGHT ICA PROX PSV: 43.6 CM/S
VAS RIGHT ICA/CCA PSV: 0.56
VAS RIGHT ICA/CCA PSV: 0.97
VAS RIGHT SUBCLAVIAN PROX PSV: 43 CM/S
VAS RIGHT SUBCLAVIAN PROX PSV: 53.6 CM/S
VAS RIGHT VERTEBRAL EDV: 4.96 CM/S
VAS RIGHT VERTEBRAL EDV: 7.71 CM/S
VAS RIGHT VERTEBRAL PSV: 25.9 CM/S
VAS RIGHT VERTEBRAL PSV: 30.8 CM/S
WBC # BLD AUTO: 6.7 K/UL (ref 4–11)

## 2025-01-31 PROCEDURE — 83735 ASSAY OF MAGNESIUM: CPT

## 2025-01-31 PROCEDURE — 6360000004 HC RX CONTRAST MEDICATION: Performed by: INTERNAL MEDICINE

## 2025-01-31 PROCEDURE — 93880 EXTRACRANIAL BILAT STUDY: CPT

## 2025-01-31 PROCEDURE — 85730 THROMBOPLASTIN TIME PARTIAL: CPT

## 2025-01-31 PROCEDURE — 85520 HEPARIN ASSAY: CPT

## 2025-01-31 PROCEDURE — 94640 AIRWAY INHALATION TREATMENT: CPT

## 2025-01-31 PROCEDURE — 93880 EXTRACRANIAL BILAT STUDY: CPT | Performed by: INTERNAL MEDICINE

## 2025-01-31 PROCEDURE — 71275 CT ANGIOGRAPHY CHEST: CPT

## 2025-01-31 PROCEDURE — 85610 PROTHROMBIN TIME: CPT

## 2025-01-31 PROCEDURE — 94761 N-INVAS EAR/PLS OXIMETRY MLT: CPT

## 2025-01-31 PROCEDURE — 6370000000 HC RX 637 (ALT 250 FOR IP): Performed by: STUDENT IN AN ORGANIZED HEALTH CARE EDUCATION/TRAINING PROGRAM

## 2025-01-31 PROCEDURE — 80048 BASIC METABOLIC PNL TOTAL CA: CPT

## 2025-01-31 PROCEDURE — 2580000003 HC RX 258: Performed by: STUDENT IN AN ORGANIZED HEALTH CARE EDUCATION/TRAINING PROGRAM

## 2025-01-31 PROCEDURE — 99291 CRITICAL CARE FIRST HOUR: CPT | Performed by: INTERNAL MEDICINE

## 2025-01-31 PROCEDURE — 2000000000 HC ICU R&B

## 2025-01-31 PROCEDURE — 6360000002 HC RX W HCPCS: Performed by: STUDENT IN AN ORGANIZED HEALTH CARE EDUCATION/TRAINING PROGRAM

## 2025-01-31 PROCEDURE — 84132 ASSAY OF SERUM POTASSIUM: CPT

## 2025-01-31 PROCEDURE — 85027 COMPLETE CBC AUTOMATED: CPT

## 2025-01-31 RX ORDER — MAGNESIUM SULFATE IN WATER 40 MG/ML
2000 INJECTION, SOLUTION INTRAVENOUS PRN
Status: DISCONTINUED | OUTPATIENT
Start: 2025-01-31 | End: 2025-02-07 | Stop reason: HOSPADM

## 2025-01-31 RX ORDER — BISACODYL 5 MG/1
5 TABLET, DELAYED RELEASE ORAL DAILY PRN
Status: DISCONTINUED | OUTPATIENT
Start: 2025-01-31 | End: 2025-02-07 | Stop reason: HOSPADM

## 2025-01-31 RX ORDER — CLOBETASOL PROPIONATE 0.5 MG/G
CREAM TOPICAL 2 TIMES DAILY
Status: DISCONTINUED | OUTPATIENT
Start: 2025-01-31 | End: 2025-02-07 | Stop reason: HOSPADM

## 2025-01-31 RX ORDER — POTASSIUM CHLORIDE 7.45 MG/ML
10 INJECTION INTRAVENOUS PRN
Status: DISCONTINUED | OUTPATIENT
Start: 2025-01-31 | End: 2025-02-07 | Stop reason: HOSPADM

## 2025-01-31 RX ORDER — IOPAMIDOL 755 MG/ML
150 INJECTION, SOLUTION INTRAVASCULAR
Status: COMPLETED | OUTPATIENT
Start: 2025-01-31 | End: 2025-01-31

## 2025-01-31 RX ORDER — BUDESONIDE AND FORMOTEROL FUMARATE DIHYDRATE 80; 4.5 UG/1; UG/1
2 AEROSOL RESPIRATORY (INHALATION)
Status: DISCONTINUED | OUTPATIENT
Start: 2025-01-31 | End: 2025-02-07 | Stop reason: HOSPADM

## 2025-01-31 RX ORDER — POTASSIUM CHLORIDE 1500 MG/1
40 TABLET, EXTENDED RELEASE ORAL PRN
Status: DISCONTINUED | OUTPATIENT
Start: 2025-01-31 | End: 2025-02-07 | Stop reason: HOSPADM

## 2025-01-31 RX ORDER — TROSPIUM CHLORIDE 20 MG/1
20 TABLET, FILM COATED ORAL
Status: DISCONTINUED | OUTPATIENT
Start: 2025-01-31 | End: 2025-02-07 | Stop reason: HOSPADM

## 2025-01-31 RX ADMIN — CLOBETASOL PROPIONATE CREAM USP, 0.05%: 0.5 CREAM TOPICAL at 21:32

## 2025-01-31 RX ADMIN — HEPARIN SODIUM 18 UNITS/KG/HR: 10000 INJECTION, SOLUTION INTRAVENOUS at 09:00

## 2025-01-31 RX ADMIN — BISACODYL 5 MG: 5 TABLET, COATED ORAL at 17:11

## 2025-01-31 RX ADMIN — TROSPIUM CHLORIDE 20 MG: 20 TABLET, FILM COATED ORAL at 12:42

## 2025-01-31 RX ADMIN — SODIUM CHLORIDE 125 MG: 9 INJECTION, SOLUTION INTRAVENOUS at 13:09

## 2025-01-31 RX ADMIN — IOPAMIDOL 150 ML: 755 INJECTION, SOLUTION INTRAVENOUS at 14:20

## 2025-01-31 RX ADMIN — HEPARIN SODIUM 18 UNITS/KG/HR: 10000 INJECTION, SOLUTION INTRAVENOUS at 13:04

## 2025-01-31 RX ADMIN — CLOBETASOL PROPIONATE CREAM USP, 0.05%: 0.5 CREAM TOPICAL at 12:46

## 2025-01-31 RX ADMIN — Medication 2 PUFF: at 21:45

## 2025-01-31 RX ADMIN — CARVEDILOL 3.12 MG: 3.12 TABLET, FILM COATED ORAL at 12:43

## 2025-01-31 RX ADMIN — Medication 2 PUFF: at 09:03

## 2025-01-31 RX ADMIN — ASPIRIN 81 MG: 81 TABLET, CHEWABLE ORAL at 12:43

## 2025-01-31 RX ADMIN — FUROSEMIDE 5 MG/HR: 10 INJECTION, SOLUTION INTRAMUSCULAR; INTRAVENOUS at 13:21

## 2025-01-31 RX ADMIN — CARVEDILOL 3.12 MG: 3.12 TABLET, FILM COATED ORAL at 17:15

## 2025-01-31 RX ADMIN — POTASSIUM BICARBONATE 40 MEQ: 782 TABLET, EFFERVESCENT ORAL at 08:37

## 2025-01-31 RX ADMIN — HEPARIN SODIUM 2000 UNITS: 1000 INJECTION INTRAVENOUS; SUBCUTANEOUS at 08:53

## 2025-01-31 RX ADMIN — TROSPIUM CHLORIDE 20 MG: 20 TABLET, FILM COATED ORAL at 17:11

## 2025-01-31 RX ADMIN — ATORVASTATIN CALCIUM 80 MG: 80 TABLET, FILM COATED ORAL at 21:32

## 2025-01-31 ASSESSMENT — PAIN SCALES - GENERAL: PAINLEVEL_OUTOF10: 0

## 2025-01-31 NOTE — CONSULTS
HF RN consult received from Dr Meléndez as part of HF order set.  Chart reviewed.   Pt has hx of HTN / HLD that is managed by his PCP.  He does not have an established cardiologist.    Pt presented with c/o WALLER /SOB for approx 2 weeks.  He was treated with abx and inhalers but without improvement.  His symptoms progressed to include orthopnea and new gabriela LE edema.  New onset of HF was suspected.   Cardiology was consulted and noted significant murmur.  Echo  1/29 showed EF 32%.  Pt underwent LHC on 1/30 revealing critical coronary lesions and severe AS.  Cardiac index was low and pt was started on dobutamine gtt.    Pt was accepted by Dr Angel for transfer to [place] to begin TAVR workup.  Tentative plan is to have PCI of LAD / Cx on 2/3 with timing of TAVR to be determined.   Basic HF information / education material was attached to chart.  No HF metrics required in setting of urgent transfer to another facility.

## 2025-01-31 NOTE — DISCHARGE SUMMARY
Hospital Discharge Summary    Patient's PCP: Columba Oviedo MD  Admit Date: 1/27/2025   Discharge Date: 1/30/2025    Admitting Physician: Dr. Daniel Meléndez MD  Discharge Physician: Dr. 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      In the ED, he was mildly tachycardic 103, otherwise afebrile. Was not hypoxic but did desaturate to 86% on a walk test.      Chest x-ray showed mild bilateral pleural effusions and opacities     Initial BMP was without any electrolyte abnormalities.     NT proBNP was 12,500, and troponin was 75->73.     ALT and AST are elevated at 61 and 64 respectively      EKG was NSR but with PVCs but no ischemic changes     Patient was given 40 mg IV Lasix           Discharge Diagnoses:          Acute heart failure with reduced EF 30 - 35%: Confirmed present on admission  -Presented with dyspnea, lower extremity edema, evidence of fluid overload on exam, and on imaging, echo showed decreased EF 30 to 35%, Angiogram 1/30/25 showed Significantly elevated right and left-sided filling pressures suggesting significant volume overload. Moderately elevated PA pressures and PVR. Low cardiac output/index and high SVR consistent with decompensated heart failure     Severe ischemic cardiomyopathy 30 - 35%: Confirmed present on admission  - Sec to ischemic CAD and severe aortic stenosis     Severe aortic stenosis: Confirmed present on admission     NSTEMI with Multivessel CAD: Confirmed present on admission  - Presented with Elevated troponin, abnormal EKG with Frequent PVCs; LVH, right axis deviation   - Echo showed reduced EF 30 - 35% with global hypokinesis   - Angiogram 1/30/2025: severe CAD involving

## 2025-01-31 NOTE — CONSULTS
[facility]  Cardiology Note  [phone]      No chief complaint on file.       History of Present Illness:  Tommy Amato is a 89 y.o. patient PMHx HTN who presented to the hospital with complaints of shortness of breath    Found to have reduced EF, concern for low flow low gradient severe AS. Underwent LHC/RHC with severe multivessel CAD, valve area of 0.4, elevated filling pressures and reduced cardiac index. He was started on a lasix gtt and low dose dobutamine    Patient and wife report a few days of near immobility due to fatigue and dyspnea prior to admission. They do not note an inciting factor. On further questioning he reports progressive fatigue for at least several months prior to admission.     Denies chest pain. Endorses orthopnea and leg swelling (since improved), and dyspnea.     Past Medical History:   has a past medical history of Angioma, Arthritis, Bunion, Burn, CAD in native artery, Cataract, Cerebellar infarct (HCC), Deep vein thrombosis (HCC), Essential hypertension, benign, Ganglion cyst, Hammer toe, Heart failure (HCC), Hyperlipidemia, Inguinal hernia, Major depressive disorder, recurrent, mild, and Primary osteoarthritis of right knee.    Surgical History:   has a past surgical history that includes hernia repair (1980); Varicose vein surgery (2008); Varicose vein surgery; Dental surgery; Inguinal hernia repair; Inguinal hernia repair (08/24/2011); eye surgery; Cataract removal (Bilateral, May '14); Dental surgery (03/04/2020); Hand surgery; Mohs surgery (Left, 05/23/2024); Cardiac procedure (N/A, 1/30/2025); and Cardiac procedure (N/A, 1/30/2025).     Social History:   reports that he quit smoking about 60 years ago. His smoking use included pipe. He has never used smokeless tobacco. He reports current alcohol use of about 3.0 standard drinks of alcohol per week. He reports that he does not use drugs.     Family History:  Family History   Problem Relation Age of Onset

## 2025-01-31 NOTE — H&P
History and Physical      Name:  Tommy Amato /Age/Sex: 1935  (89 y.o. male)   MRN & CSN:  [id number] & [id number] Encounter Date/Time: 2025 10:18 PM EST   Location:  [place] [address]01 PCP: Columba Oviedo MD       Assessment and Plan:   Tommy Amato is a 89 y.o. male with hypertension, hyperlipidemia, cerebellar CVA, BPH, dementia, iron deficiency anemia, new onset: acute heart failure, severe ischemic cardiomyopathy, multivessel CAD, RLE DVT transferred from [facility] for evaluation of high risk CABG + AVR or PCI with TAVR    Acute systolic heart failure   Ischemic Cardiomyopathy   Echocardiogram 2025 EF 30 to 35%  Continue IV Lasix and IV dobutamine infusion  Low-dose carvedilol, monitor intake and output measure daily weight    Severe Aortic stenosis   Low-flow low gradient, SHAKIRA 1.1 cm²    NSTEMI with multivessel CAD   [facility] 2025 with severe obstructive CAD involving ostial and mid LAD  Aspirin and Lipitor  IV heparin infusion  Hold Plavix in case CABG is planned  Transferred from [facility] for evaluation of high risk CABG + AVR or PCI with TAVR, consult cardiology and cardiothoracic surgery.    Acute R LE DVT  IV heparin infusion    Chronic iron deficiency anemia  IV iron infusion    Hx of cerebellar CVA  Aspirin and Lipitor    Hypertension  Low-dose carvedilol    Hyperlipidemia  Lipitor    Mild dementia    Inpatient, CVICU telemetry  Full Code     Disposition:     Current Living situation: Home  Expected Disposition: Home vs rehab  Estimated D/C: 2-3 days    Diet Diet NPO   DVT Prophylaxis [] Lovenox, [x]  Heparin, [] SCDs, [] Ambulation,  [] Eliquis, [] Xarelto, [] Coumadin   Code Status Full Code   Surrogate Decision Maker/ POA Robina, spouse     Personally reviewed Lab Studies and Imaging   Drugs that require monitoring for toxicity include IV heparin infusion and the method of monitoring was antifactor Xa    History from:     Patient     History of Present Illness:     Chief Complaint:

## 2025-02-01 LAB
ALBUMIN SERPL-MCNC: 3.3 G/DL (ref 3.4–5)
ANION GAP SERPL CALCULATED.3IONS-SCNC: 8 MMOL/L (ref 3–16)
ANTI-XA UNFRAC HEPARIN: 0.38 IU/ML (ref 0.3–0.7)
ANTI-XA UNFRAC HEPARIN: 0.42 IU/ML (ref 0.3–0.7)
BASOPHILS # BLD: 0 K/UL (ref 0–0.2)
BASOPHILS NFR BLD: 0.4 %
BUN SERPL-MCNC: 22 MG/DL (ref 7–20)
CALCIUM SERPL-MCNC: 9.1 MG/DL (ref 8.3–10.6)
CHLORIDE SERPL-SCNC: 98 MMOL/L (ref 99–110)
CO2 SERPL-SCNC: 30 MMOL/L (ref 21–32)
CREAT SERPL-MCNC: 0.9 MG/DL (ref 0.8–1.3)
DEPRECATED RDW RBC AUTO: 14.9 % (ref 12.4–15.4)
EOSINOPHIL # BLD: 0.2 K/UL (ref 0–0.6)
EOSINOPHIL NFR BLD: 2.6 %
GFR SERPLBLD CREATININE-BSD FMLA CKD-EPI: 82 ML/MIN/{1.73_M2}
GLUCOSE SERPL-MCNC: 111 MG/DL (ref 70–99)
HCT VFR BLD AUTO: 39.4 % (ref 40.5–52.5)
HGB BLD-MCNC: 13.3 G/DL (ref 13.5–17.5)
LYMPHOCYTES # BLD: 0.7 K/UL (ref 1–5.1)
LYMPHOCYTES NFR BLD: 10.1 %
MAGNESIUM SERPL-MCNC: 2.16 MG/DL (ref 1.8–2.4)
MCH RBC QN AUTO: 31.6 PG (ref 26–34)
MCHC RBC AUTO-ENTMCNC: 33.9 G/DL (ref 31–36)
MCV RBC AUTO: 93.4 FL (ref 80–100)
MONOCYTES # BLD: 0.8 K/UL (ref 0–1.3)
MONOCYTES NFR BLD: 11.7 %
NEUTROPHILS # BLD: 5.2 K/UL (ref 1.7–7.7)
NEUTROPHILS NFR BLD: 75.2 %
PHOSPHATE SERPL-MCNC: 2.6 MG/DL (ref 2.5–4.9)
PLATELET # BLD AUTO: 201 K/UL (ref 135–450)
PMV BLD AUTO: 10.2 FL (ref 5–10.5)
POTASSIUM SERPL-SCNC: 3.5 MMOL/L (ref 3.5–5.1)
RBC # BLD AUTO: 4.22 M/UL (ref 4.2–5.9)
SODIUM SERPL-SCNC: 136 MMOL/L (ref 136–145)
WBC # BLD AUTO: 7 K/UL (ref 4–11)

## 2025-02-01 PROCEDURE — 97165 OT EVAL LOW COMPLEX 30 MIN: CPT

## 2025-02-01 PROCEDURE — 97530 THERAPEUTIC ACTIVITIES: CPT

## 2025-02-01 PROCEDURE — 6370000000 HC RX 637 (ALT 250 FOR IP): Performed by: STUDENT IN AN ORGANIZED HEALTH CARE EDUCATION/TRAINING PROGRAM

## 2025-02-01 PROCEDURE — 94761 N-INVAS EAR/PLS OXIMETRY MLT: CPT

## 2025-02-01 PROCEDURE — 94640 AIRWAY INHALATION TREATMENT: CPT

## 2025-02-01 PROCEDURE — 6360000002 HC RX W HCPCS: Performed by: STUDENT IN AN ORGANIZED HEALTH CARE EDUCATION/TRAINING PROGRAM

## 2025-02-01 PROCEDURE — 85520 HEPARIN ASSAY: CPT

## 2025-02-01 PROCEDURE — 99291 CRITICAL CARE FIRST HOUR: CPT | Performed by: INTERNAL MEDICINE

## 2025-02-01 PROCEDURE — 85025 COMPLETE CBC W/AUTO DIFF WBC: CPT

## 2025-02-01 PROCEDURE — 83735 ASSAY OF MAGNESIUM: CPT

## 2025-02-01 PROCEDURE — 2580000003 HC RX 258: Performed by: STUDENT IN AN ORGANIZED HEALTH CARE EDUCATION/TRAINING PROGRAM

## 2025-02-01 PROCEDURE — 80069 RENAL FUNCTION PANEL: CPT

## 2025-02-01 PROCEDURE — 97116 GAIT TRAINING THERAPY: CPT

## 2025-02-01 PROCEDURE — 2000000000 HC ICU R&B

## 2025-02-01 PROCEDURE — 97535 SELF CARE MNGMENT TRAINING: CPT

## 2025-02-01 PROCEDURE — 97161 PT EVAL LOW COMPLEX 20 MIN: CPT

## 2025-02-01 RX ADMIN — Medication 2 PUFF: at 07:50

## 2025-02-01 RX ADMIN — CLOBETASOL PROPIONATE CREAM USP, 0.05%: 0.5 CREAM TOPICAL at 19:21

## 2025-02-01 RX ADMIN — ATORVASTATIN CALCIUM 80 MG: 80 TABLET, FILM COATED ORAL at 19:24

## 2025-02-01 RX ADMIN — HEPARIN SODIUM 16 UNITS/KG/HR: 10000 INJECTION, SOLUTION INTRAVENOUS at 10:51

## 2025-02-01 RX ADMIN — TROSPIUM CHLORIDE 20 MG: 20 TABLET, FILM COATED ORAL at 09:29

## 2025-02-01 RX ADMIN — POTASSIUM CHLORIDE 40 MEQ: 1500 TABLET, EXTENDED RELEASE ORAL at 09:30

## 2025-02-01 RX ADMIN — ASPIRIN 81 MG: 81 TABLET, CHEWABLE ORAL at 09:29

## 2025-02-01 RX ADMIN — Medication 2 PUFF: at 19:38

## 2025-02-01 RX ADMIN — SODIUM CHLORIDE 125 MG: 9 INJECTION, SOLUTION INTRAVENOUS at 11:58

## 2025-02-01 RX ADMIN — CLOBETASOL PROPIONATE CREAM USP, 0.05%: 0.5 CREAM TOPICAL at 09:31

## 2025-02-01 RX ADMIN — TROSPIUM CHLORIDE 20 MG: 20 TABLET, FILM COATED ORAL at 15:41

## 2025-02-02 LAB
ALBUMIN SERPL-MCNC: 3.3 G/DL (ref 3.4–5)
ANION GAP SERPL CALCULATED.3IONS-SCNC: 7 MMOL/L (ref 3–16)
ANTI-XA UNFRAC HEPARIN: 0.27 IU/ML (ref 0.3–0.7)
ANTI-XA UNFRAC HEPARIN: 0.43 IU/ML (ref 0.3–0.7)
ANTI-XA UNFRAC HEPARIN: 1.04 IU/ML (ref 0.3–0.7)
BASOPHILS # BLD: 0 K/UL (ref 0–0.2)
BASOPHILS NFR BLD: 0.8 %
BUN SERPL-MCNC: 24 MG/DL (ref 7–20)
CALCIUM SERPL-MCNC: 8.9 MG/DL (ref 8.3–10.6)
CHLORIDE SERPL-SCNC: 101 MMOL/L (ref 99–110)
CO2 SERPL-SCNC: 27 MMOL/L (ref 21–32)
CREAT SERPL-MCNC: 1 MG/DL (ref 0.8–1.3)
DEPRECATED RDW RBC AUTO: 14.9 % (ref 12.4–15.4)
EOSINOPHIL # BLD: 0.2 K/UL (ref 0–0.6)
EOSINOPHIL NFR BLD: 2.9 %
GFR SERPLBLD CREATININE-BSD FMLA CKD-EPI: 72 ML/MIN/{1.73_M2}
GLUCOSE SERPL-MCNC: 102 MG/DL (ref 70–99)
HCT VFR BLD AUTO: 38.8 % (ref 40.5–52.5)
HGB BLD-MCNC: 12.8 G/DL (ref 13.5–17.5)
LYMPHOCYTES # BLD: 0.8 K/UL (ref 1–5.1)
LYMPHOCYTES NFR BLD: 13.7 %
MAGNESIUM SERPL-MCNC: 2.31 MG/DL (ref 1.8–2.4)
MCH RBC QN AUTO: 31.3 PG (ref 26–34)
MCHC RBC AUTO-ENTMCNC: 32.9 G/DL (ref 31–36)
MCV RBC AUTO: 95.2 FL (ref 80–100)
MONOCYTES # BLD: 0.7 K/UL (ref 0–1.3)
MONOCYTES NFR BLD: 12.4 %
NEUTROPHILS # BLD: 4 K/UL (ref 1.7–7.7)
NEUTROPHILS NFR BLD: 70.2 %
PHOSPHATE SERPL-MCNC: 2.2 MG/DL (ref 2.5–4.9)
PLATELET # BLD AUTO: 194 K/UL (ref 135–450)
PMV BLD AUTO: 10.3 FL (ref 5–10.5)
POTASSIUM SERPL-SCNC: 3.9 MMOL/L (ref 3.5–5.1)
RBC # BLD AUTO: 4.08 M/UL (ref 4.2–5.9)
SODIUM SERPL-SCNC: 135 MMOL/L (ref 136–145)
WBC # BLD AUTO: 5.7 K/UL (ref 4–11)

## 2025-02-02 PROCEDURE — 6370000000 HC RX 637 (ALT 250 FOR IP): Performed by: STUDENT IN AN ORGANIZED HEALTH CARE EDUCATION/TRAINING PROGRAM

## 2025-02-02 PROCEDURE — 99291 CRITICAL CARE FIRST HOUR: CPT | Performed by: INTERNAL MEDICINE

## 2025-02-02 PROCEDURE — 2000000000 HC ICU R&B

## 2025-02-02 PROCEDURE — 85025 COMPLETE CBC W/AUTO DIFF WBC: CPT

## 2025-02-02 PROCEDURE — 80069 RENAL FUNCTION PANEL: CPT

## 2025-02-02 PROCEDURE — 94640 AIRWAY INHALATION TREATMENT: CPT

## 2025-02-02 PROCEDURE — 94761 N-INVAS EAR/PLS OXIMETRY MLT: CPT

## 2025-02-02 PROCEDURE — 6360000002 HC RX W HCPCS: Performed by: STUDENT IN AN ORGANIZED HEALTH CARE EDUCATION/TRAINING PROGRAM

## 2025-02-02 PROCEDURE — 83735 ASSAY OF MAGNESIUM: CPT

## 2025-02-02 PROCEDURE — 85520 HEPARIN ASSAY: CPT

## 2025-02-02 RX ORDER — BACITRACIN ZINC AND POLYMYXIN B SULFATE 500; 10000 [USP'U]/G; [USP'U]/G
OINTMENT TOPICAL 2 TIMES DAILY PRN
Status: DISCONTINUED | OUTPATIENT
Start: 2025-02-02 | End: 2025-02-07 | Stop reason: HOSPADM

## 2025-02-02 RX ADMIN — CLOBETASOL PROPIONATE CREAM USP, 0.05%: 0.5 CREAM TOPICAL at 09:00

## 2025-02-02 RX ADMIN — TROSPIUM CHLORIDE 20 MG: 20 TABLET, FILM COATED ORAL at 15:54

## 2025-02-02 RX ADMIN — HEPARIN SODIUM 18 UNITS/KG/HR: 10000 INJECTION, SOLUTION INTRAVENOUS at 09:04

## 2025-02-02 RX ADMIN — ATORVASTATIN CALCIUM 80 MG: 80 TABLET, FILM COATED ORAL at 19:51

## 2025-02-02 RX ADMIN — BISACODYL 5 MG: 5 TABLET, COATED ORAL at 13:09

## 2025-02-02 RX ADMIN — BACITRACIN ZINC AND POLYMYXIN B SULFATE 28.3 G: at 19:51

## 2025-02-02 RX ADMIN — TROSPIUM CHLORIDE 20 MG: 20 TABLET, FILM COATED ORAL at 09:00

## 2025-02-02 RX ADMIN — Medication 2 PUFF: at 20:02

## 2025-02-02 RX ADMIN — ASPIRIN 81 MG: 81 TABLET, CHEWABLE ORAL at 09:00

## 2025-02-02 RX ADMIN — BACITRACIN ZINC AND POLYMYXIN B SULFATE: at 14:13

## 2025-02-02 RX ADMIN — Medication 2 PUFF: at 12:21

## 2025-02-03 DIAGNOSIS — I25.10 CORONARY ARTERY DISEASE DUE TO LIPID RICH PLAQUE: Primary | ICD-10-CM

## 2025-02-03 DIAGNOSIS — I25.83 CORONARY ARTERY DISEASE DUE TO LIPID RICH PLAQUE: Primary | ICD-10-CM

## 2025-02-03 LAB
ALBUMIN SERPL-MCNC: 3.2 G/DL (ref 3.4–5)
ANION GAP SERPL CALCULATED.3IONS-SCNC: 8 MMOL/L (ref 3–16)
ANTI-XA UNFRAC HEPARIN: 0.19 IU/ML (ref 0.3–0.7)
ANTI-XA UNFRAC HEPARIN: <0.1 IU/ML (ref 0.3–0.7)
ANTI-XA UNFRAC HEPARIN: >1.1 IU/ML (ref 0.3–0.7)
BASOPHILS # BLD: 0 K/UL (ref 0–0.2)
BASOPHILS NFR BLD: 0.4 %
BUN SERPL-MCNC: 18 MG/DL (ref 7–20)
CALCIUM SERPL-MCNC: 8.9 MG/DL (ref 8.3–10.6)
CHLORIDE SERPL-SCNC: 104 MMOL/L (ref 99–110)
CO2 SERPL-SCNC: 24 MMOL/L (ref 21–32)
CREAT SERPL-MCNC: 0.8 MG/DL (ref 0.8–1.3)
DEPRECATED RDW RBC AUTO: 15.3 % (ref 12.4–15.4)
ECHO BSA: 1.89 M2
EOSINOPHIL # BLD: 0.1 K/UL (ref 0–0.6)
EOSINOPHIL NFR BLD: 2.3 %
GFR SERPLBLD CREATININE-BSD FMLA CKD-EPI: 84 ML/MIN/{1.73_M2}
GLUCOSE SERPL-MCNC: 134 MG/DL (ref 70–99)
HCT VFR BLD AUTO: 39.3 % (ref 40.5–52.5)
HGB BLD-MCNC: 13.2 G/DL (ref 13.5–17.5)
LYMPHOCYTES # BLD: 0.8 K/UL (ref 1–5.1)
LYMPHOCYTES NFR BLD: 12.1 %
MAGNESIUM SERPL-MCNC: 2.22 MG/DL (ref 1.8–2.4)
MCH RBC QN AUTO: 31.9 PG (ref 26–34)
MCHC RBC AUTO-ENTMCNC: 33.6 G/DL (ref 31–36)
MCV RBC AUTO: 94.9 FL (ref 80–100)
MONOCYTES # BLD: 0.7 K/UL (ref 0–1.3)
MONOCYTES NFR BLD: 11.7 %
NEUTROPHILS # BLD: 4.6 K/UL (ref 1.7–7.7)
NEUTROPHILS NFR BLD: 73.5 %
PHOSPHATE SERPL-MCNC: 2.3 MG/DL (ref 2.5–4.9)
PLATELET # BLD AUTO: 188 K/UL (ref 135–450)
PMV BLD AUTO: 10.7 FL (ref 5–10.5)
POC ACT LR: 135 SEC
POTASSIUM SERPL-SCNC: 3.8 MMOL/L (ref 3.5–5.1)
RBC # BLD AUTO: 4.14 M/UL (ref 4.2–5.9)
SODIUM SERPL-SCNC: 136 MMOL/L (ref 136–145)
WBC # BLD AUTO: 6.3 K/UL (ref 4–11)

## 2025-02-03 PROCEDURE — 4A023N7 MEASUREMENT OF CARDIAC SAMPLING AND PRESSURE, LEFT HEART, PERCUTANEOUS APPROACH: ICD-10-PCS | Performed by: INTERNAL MEDICINE

## 2025-02-03 PROCEDURE — 99152 MOD SED SAME PHYS/QHP 5/>YRS: CPT | Performed by: INTERNAL MEDICINE

## 2025-02-03 PROCEDURE — 6360000002 HC RX W HCPCS: Performed by: INTERNAL MEDICINE

## 2025-02-03 PROCEDURE — 6370000000 HC RX 637 (ALT 250 FOR IP): Performed by: INTERNAL MEDICINE

## 2025-02-03 PROCEDURE — 2709999900 HC NON-CHARGEABLE SUPPLY: Performed by: INTERNAL MEDICINE

## 2025-02-03 PROCEDURE — 2000000000 HC ICU R&B

## 2025-02-03 PROCEDURE — B2111ZZ FLUOROSCOPY OF MULTIPLE CORONARY ARTERIES USING LOW OSMOLAR CONTRAST: ICD-10-PCS | Performed by: INTERNAL MEDICINE

## 2025-02-03 PROCEDURE — C1725 CATH, TRANSLUMIN NON-LASER: HCPCS | Performed by: INTERNAL MEDICINE

## 2025-02-03 PROCEDURE — 6360000002 HC RX W HCPCS: Performed by: STUDENT IN AN ORGANIZED HEALTH CARE EDUCATION/TRAINING PROGRAM

## 2025-02-03 PROCEDURE — 6370000000 HC RX 637 (ALT 250 FOR IP): Performed by: STUDENT IN AN ORGANIZED HEALTH CARE EDUCATION/TRAINING PROGRAM

## 2025-02-03 PROCEDURE — 99153 MOD SED SAME PHYS/QHP EA: CPT | Performed by: INTERNAL MEDICINE

## 2025-02-03 PROCEDURE — 92928 PRQ TCAT PLMT NTRAC ST 1 LES: CPT | Performed by: INTERNAL MEDICINE

## 2025-02-03 PROCEDURE — 85520 HEPARIN ASSAY: CPT

## 2025-02-03 PROCEDURE — C1894 INTRO/SHEATH, NON-LASER: HCPCS | Performed by: INTERNAL MEDICINE

## 2025-02-03 PROCEDURE — 99232 SBSQ HOSP IP/OBS MODERATE 35: CPT | Performed by: INTERNAL MEDICINE

## 2025-02-03 PROCEDURE — 85347 COAGULATION TIME ACTIVATED: CPT

## 2025-02-03 PROCEDURE — C1874 STENT, COATED/COV W/DEL SYS: HCPCS | Performed by: INTERNAL MEDICINE

## 2025-02-03 PROCEDURE — B2151ZZ FLUOROSCOPY OF LEFT HEART USING LOW OSMOLAR CONTRAST: ICD-10-PCS | Performed by: INTERNAL MEDICINE

## 2025-02-03 PROCEDURE — 2500000003 HC RX 250 WO HCPCS: Performed by: STUDENT IN AN ORGANIZED HEALTH CARE EDUCATION/TRAINING PROGRAM

## 2025-02-03 PROCEDURE — 94640 AIRWAY INHALATION TREATMENT: CPT

## 2025-02-03 PROCEDURE — 6360000004 HC RX CONTRAST MEDICATION: Performed by: INTERNAL MEDICINE

## 2025-02-03 PROCEDURE — C1769 GUIDE WIRE: HCPCS | Performed by: INTERNAL MEDICINE

## 2025-02-03 PROCEDURE — 83735 ASSAY OF MAGNESIUM: CPT

## 2025-02-03 PROCEDURE — 2580000003 HC RX 258: Performed by: STUDENT IN AN ORGANIZED HEALTH CARE EDUCATION/TRAINING PROGRAM

## 2025-02-03 PROCEDURE — 93454 CORONARY ARTERY ANGIO S&I: CPT | Performed by: INTERNAL MEDICINE

## 2025-02-03 PROCEDURE — C1887 CATHETER, GUIDING: HCPCS | Performed by: INTERNAL MEDICINE

## 2025-02-03 PROCEDURE — 2580000003 HC RX 258: Performed by: INTERNAL MEDICINE

## 2025-02-03 PROCEDURE — 93005 ELECTROCARDIOGRAM TRACING: CPT | Performed by: INTERNAL MEDICINE

## 2025-02-03 PROCEDURE — 027135Z DILATION OF CORONARY ARTERY, TWO ARTERIES WITH TWO DRUG-ELUTING INTRALUMINAL DEVICES, PERCUTANEOUS APPROACH: ICD-10-PCS | Performed by: INTERNAL MEDICINE

## 2025-02-03 PROCEDURE — 80069 RENAL FUNCTION PANEL: CPT

## 2025-02-03 PROCEDURE — 94761 N-INVAS EAR/PLS OXIMETRY MLT: CPT

## 2025-02-03 PROCEDURE — 85025 COMPLETE CBC W/AUTO DIFF WBC: CPT

## 2025-02-03 PROCEDURE — C1760 CLOSURE DEV, VASC: HCPCS | Performed by: INTERNAL MEDICINE

## 2025-02-03 DEVICE — EVEROLIMUS-ELUTING PLATINUM CHROMIUM CORONARY STENT SYSTEM
Type: IMPLANTABLE DEVICE | Status: FUNCTIONAL
Brand: SYNERGY™ XD

## 2025-02-03 RX ORDER — ASPIRIN 81 MG/1
81 TABLET, CHEWABLE ORAL DAILY
Status: DISCONTINUED | OUTPATIENT
Start: 2025-02-04 | End: 2025-02-03 | Stop reason: SDUPTHER

## 2025-02-03 RX ORDER — FENTANYL CITRATE 50 UG/ML
INJECTION, SOLUTION INTRAMUSCULAR; INTRAVENOUS PRN
Status: DISCONTINUED | OUTPATIENT
Start: 2025-02-03 | End: 2025-02-03 | Stop reason: HOSPADM

## 2025-02-03 RX ORDER — CLOPIDOGREL BISULFATE 75 MG/1
75 TABLET ORAL DAILY
Status: DISCONTINUED | OUTPATIENT
Start: 2025-02-03 | End: 2025-02-07 | Stop reason: HOSPADM

## 2025-02-03 RX ORDER — ASPIRIN 81 MG/1
TABLET, CHEWABLE ORAL PRN
Status: DISCONTINUED | OUTPATIENT
Start: 2025-02-03 | End: 2025-02-03 | Stop reason: HOSPADM

## 2025-02-03 RX ORDER — IOPAMIDOL 755 MG/ML
INJECTION, SOLUTION INTRAVASCULAR PRN
Status: DISCONTINUED | OUTPATIENT
Start: 2025-02-03 | End: 2025-02-03 | Stop reason: HOSPADM

## 2025-02-03 RX ORDER — BIVALIRUDIN 250 MG/5ML
INJECTION, POWDER, LYOPHILIZED, FOR SOLUTION INTRAVENOUS PRN
Status: DISCONTINUED | OUTPATIENT
Start: 2025-02-03 | End: 2025-02-03 | Stop reason: HOSPADM

## 2025-02-03 RX ORDER — ACETAMINOPHEN 325 MG/1
650 TABLET ORAL EVERY 4 HOURS PRN
Status: DISCONTINUED | OUTPATIENT
Start: 2025-02-03 | End: 2025-02-07 | Stop reason: HOSPADM

## 2025-02-03 RX ORDER — ATROPINE SULFATE 0.1 MG/ML
INJECTION INTRAVENOUS
Status: DISCONTINUED
Start: 2025-02-03 | End: 2025-02-04 | Stop reason: WASHOUT

## 2025-02-03 RX ORDER — MIDAZOLAM HYDROCHLORIDE 1 MG/ML
INJECTION, SOLUTION INTRAMUSCULAR; INTRAVENOUS PRN
Status: DISCONTINUED | OUTPATIENT
Start: 2025-02-03 | End: 2025-02-03 | Stop reason: HOSPADM

## 2025-02-03 RX ADMIN — SODIUM PHOSPHATE, MONOBASIC, MONOHYDRATE AND SODIUM PHOSPHATE, DIBASIC, ANHYDROUS 15 MMOL: 142; 276 INJECTION, SOLUTION INTRAVENOUS at 08:50

## 2025-02-03 RX ADMIN — HEPARIN SODIUM 17 UNITS/KG/HR: 10000 INJECTION, SOLUTION INTRAVENOUS at 07:14

## 2025-02-03 RX ADMIN — BACITRACIN ZINC AND POLYMYXIN B SULFATE: at 09:03

## 2025-02-03 RX ADMIN — TROSPIUM CHLORIDE 20 MG: 20 TABLET, FILM COATED ORAL at 16:54

## 2025-02-03 RX ADMIN — Medication 2 PUFF: at 06:51

## 2025-02-03 RX ADMIN — ATORVASTATIN CALCIUM 80 MG: 80 TABLET, FILM COATED ORAL at 19:55

## 2025-02-03 RX ADMIN — ASPIRIN 81 MG: 81 TABLET, CHEWABLE ORAL at 09:03

## 2025-02-03 RX ADMIN — TROSPIUM CHLORIDE 20 MG: 20 TABLET, FILM COATED ORAL at 09:02

## 2025-02-03 RX ADMIN — Medication 2 PUFF: at 20:04

## 2025-02-03 RX ADMIN — CLOPIDOGREL BISULFATE 75 MG: 75 TABLET ORAL at 19:55

## 2025-02-03 ASSESSMENT — PAIN SCALES - GENERAL
PAINLEVEL_OUTOF10: 0
PAINLEVEL_OUTOF10: 0

## 2025-02-03 NOTE — FLOWSHEET NOTE
Patient returned from cath lab, sheath present in right groin with bleeding, Dr. Ackerman to bedside to upsize sheath in an effort to stop the bleed. Angiomax infusing at 1.75mg/kg/hr. Patient awake, alert and oriented, no complaints at this time. Family at bedside updated on plan of care.    02/03/25 1400   Vitals   Temp 97.3 °F (36.3 °C)   Temp Source Oral   Pulse 84   Heart Rate Source Monitor   Respirations 16   BP (!) 151/103   MAP (Calculated) 119   MAP (mmHg) 119   BP Location Right upper arm   BP Upper/Lower Upper   BP Method Automatic   Patient Position Supine   Cardiac Rhythm Sinus rhythm with PVC   Ectopy PVC   Ectopy Frequency Frequent   Art Line   ABP (Arterial line BP) (!) 163/92   ABP Mean (Arterial Line Mean) (!) 1 mmHg   Pain Assessment   Pain Assessment None - Denies Pain   Pain Level 0   Oxygen Therapy   SpO2 99 %   Pulse Oximeter Device Mode Continuous   Pulse Oximeter Device Location Finger   O2 Device None (Room air)

## 2025-02-03 NOTE — PRE SEDATION
[facility]  Pre-sedation Assessment   PROCEDURE   Planned Procedure Cardiac catheterization   Consent I have discussed with the patient and/or the patient representative the indication, alternatives, and the possible risks and/or complications of the planned procedure and the anesthesia methods. The patient and/or patient representative appear to understand and agree to proceed.   INDICATION   Chief Complaint Dyspnea on Exertion  Dyspnea   Presentation Worsening Angina, Stable Known CAD, and Valvular Disease - Aortic Stenosis; Stenosis Severity: Severe, Mitral Regurgitation; Regurgitation Severity: Moderate (2+), Pulmonic Regurgitation; Regurgitation Severity: Moderate (2+), and Tricuspid Regurgitation; Regurgitation Severity: Moderate (2+)   Anginal Class (2 wks) CCS III - Symptoms with everyday living activities, i.e., moderate limitation   Anginal Symptoms Typical chest pain or anginal equivalent   CHF Class (2 wks) [] I     [x]II     []III     []IV    CV Instability Yes   ISCHEMIC WORKUP/MANAGEMENT/EVALUTION   Stress Test No   Anginal Meds Yes: ACE/ARB/ARNI, Aspirin, and STATIN   UPCOMING SURGERY   Valve surgery Yes:  Aortic Stenosis; Stenosis Severity: Severe N/A N/A   MEDICAL HISTORY   Vital Signs /89   Pulse 91   Temp 97.3 °F (36.3 °C) (Temporal)   Resp 16   Ht 1.676 m (5' 5.98\")   Wt 66.3 kg (146 lb 1.6 oz)   SpO2 94%   BMI 23.59 kg/m²    Allergies Reviewed in EMR   Medications Reviewed in EMR   Medical History Reviewed in EMR   Surgical History Reviewed in EMR   PRE-SEDATION   Exam I have assessed the patient and reviewed the H&P on the chart.   Hx Anesthesia Issue None   Mod Mallampati II   ASA Class Class 2 - A normal healthy patient with mild systemic disease   BELLA SCALE   Activity 2 - Able to move 4 extrem on command   Respiration 2 - Able to breath deeply and cough freely   Circulation 2 - BP +/- 20mmHg of normal   Consciousness 2 - Fully awake   Oxygen Saturation 2 - Able

## 2025-02-03 NOTE — CONSULTS
Consultation H&P    Date of Admission:  1/30/2025  8:09 PM  Date of Consultation:  2/3/2025    PCP:  Columba Oviedo MD      Cards: Dr Ann    Chief Complaint: CAD/severe AVS    History of Present Illness:      Tommy Amato is a 89 y.o. male with significant past medical history of mild dementia, HTN, HLD, ischemic cerebellar stroke (with no neuro deficits), who presented to the ER at [facility] on 1/28/25 with a 2-3 week history of exertional shortness of breath and orthopnea.  He initially was evaluated by his primary care provider with the above concerns in addition to some wheezing.  Was prescribed antibiotics and inhalers but did not improve.  In the ER he was noted to have physical exam findings consistent with heart failure.  Chest x-ray at that time showed small bilateral pleural effusions.  Have did a mild bump in his troponin to 75.  BNP noted to be 12,500.  EKG however showing no ischemic changes.  Due to lower extremity swelling he did undergo venous Doppler of the lower extremities and was noted to have an acute DVT in multiple soleal veins in the right mid calf.  The patient was admitted for CHF/Possible NSTEMI and started on diuresis.  He was noted to have a loud cardiac murmur suspicious for aortic stenosis.  Echocardiogram demonstrated an EF of 30 to 35% with severely calcified aortic valve cusps, aortic valve mean gradient of 27 mmHg concerning for possible low-flow low gradient severe aortic stenosis.  Coronary angiogram found severe multivessel coronary artery disease.  The patient was transferred here from [facility] on January 30, 2025.  CVTS was consulted for consideration for possible coronary revascularization and aortic valve replacement vs complex PCI + TAVR.     Patient is currently hemodynamically stable and denying any complaints of chest pain or shortness of breath.   Patient is a former smoker.    Patient lives at home with his

## 2025-02-04 LAB
ALBUMIN SERPL-MCNC: 3.5 G/DL (ref 3.4–5)
ANION GAP SERPL CALCULATED.3IONS-SCNC: 9 MMOL/L (ref 3–16)
ANTI-XA UNFRAC HEPARIN: 0.32 IU/ML (ref 0.3–0.7)
ANTI-XA UNFRAC HEPARIN: 0.44 IU/ML (ref 0.3–0.7)
ANTI-XA UNFRAC HEPARIN: <0.1 IU/ML (ref 0.3–0.7)
BASOPHILS # BLD: 0 K/UL (ref 0–0.2)
BASOPHILS NFR BLD: 0.5 %
BUN SERPL-MCNC: 14 MG/DL (ref 7–20)
CALCIUM SERPL-MCNC: 9.6 MG/DL (ref 8.3–10.6)
CHLORIDE SERPL-SCNC: 104 MMOL/L (ref 99–110)
CO2 SERPL-SCNC: 24 MMOL/L (ref 21–32)
CREAT SERPL-MCNC: 0.7 MG/DL (ref 0.8–1.3)
DEPRECATED RDW RBC AUTO: 15.2 % (ref 12.4–15.4)
EKG ATRIAL RATE: 82 BPM
EKG DIAGNOSIS: NORMAL
EKG P AXIS: 58 DEGREES
EKG P-R INTERVAL: 202 MS
EKG Q-T INTERVAL: 402 MS
EKG QRS DURATION: 124 MS
EKG QTC CALCULATION (BAZETT): 469 MS
EKG R AXIS: -9 DEGREES
EKG T AXIS: 68 DEGREES
EKG VENTRICULAR RATE: 82 BPM
EOSINOPHIL # BLD: 0.1 K/UL (ref 0–0.6)
EOSINOPHIL NFR BLD: 1.3 %
GFR SERPLBLD CREATININE-BSD FMLA CKD-EPI: 88 ML/MIN/{1.73_M2}
GLUCOSE SERPL-MCNC: 89 MG/DL (ref 70–99)
HCT VFR BLD AUTO: 41.9 % (ref 40.5–52.5)
HGB BLD-MCNC: 13.6 G/DL (ref 13.5–17.5)
LYMPHOCYTES # BLD: 0.6 K/UL (ref 1–5.1)
LYMPHOCYTES NFR BLD: 7.5 %
MCH RBC QN AUTO: 31.4 PG (ref 26–34)
MCHC RBC AUTO-ENTMCNC: 32.4 G/DL (ref 31–36)
MCV RBC AUTO: 96.9 FL (ref 80–100)
MONOCYTES # BLD: 0.7 K/UL (ref 0–1.3)
MONOCYTES NFR BLD: 8.5 %
NEUTROPHILS # BLD: 6.3 K/UL (ref 1.7–7.7)
NEUTROPHILS NFR BLD: 82.2 %
PHOSPHATE SERPL-MCNC: 2.3 MG/DL (ref 2.5–4.9)
PLATELET # BLD AUTO: 200 K/UL (ref 135–450)
PMV BLD AUTO: 10.3 FL (ref 5–10.5)
POTASSIUM SERPL-SCNC: 3.9 MMOL/L (ref 3.5–5.1)
RBC # BLD AUTO: 4.33 M/UL (ref 4.2–5.9)
SODIUM SERPL-SCNC: 137 MMOL/L (ref 136–145)
WBC # BLD AUTO: 7.7 K/UL (ref 4–11)

## 2025-02-04 PROCEDURE — 94640 AIRWAY INHALATION TREATMENT: CPT

## 2025-02-04 PROCEDURE — 97535 SELF CARE MNGMENT TRAINING: CPT

## 2025-02-04 PROCEDURE — 99232 SBSQ HOSP IP/OBS MODERATE 35: CPT | Performed by: INTERNAL MEDICINE

## 2025-02-04 PROCEDURE — 6370000000 HC RX 637 (ALT 250 FOR IP): Performed by: STUDENT IN AN ORGANIZED HEALTH CARE EDUCATION/TRAINING PROGRAM

## 2025-02-04 PROCEDURE — 2500000003 HC RX 250 WO HCPCS: Performed by: INTERNAL MEDICINE

## 2025-02-04 PROCEDURE — 2000000000 HC ICU R&B

## 2025-02-04 PROCEDURE — 85520 HEPARIN ASSAY: CPT

## 2025-02-04 PROCEDURE — 94761 N-INVAS EAR/PLS OXIMETRY MLT: CPT

## 2025-02-04 PROCEDURE — 80069 RENAL FUNCTION PANEL: CPT

## 2025-02-04 PROCEDURE — 93010 ELECTROCARDIOGRAM REPORT: CPT | Performed by: INTERNAL MEDICINE

## 2025-02-04 PROCEDURE — 2580000003 HC RX 258: Performed by: INTERNAL MEDICINE

## 2025-02-04 PROCEDURE — 6370000000 HC RX 637 (ALT 250 FOR IP): Performed by: INTERNAL MEDICINE

## 2025-02-04 PROCEDURE — 85025 COMPLETE CBC W/AUTO DIFF WBC: CPT

## 2025-02-04 PROCEDURE — 97530 THERAPEUTIC ACTIVITIES: CPT

## 2025-02-04 PROCEDURE — 97116 GAIT TRAINING THERAPY: CPT

## 2025-02-04 PROCEDURE — 6360000002 HC RX W HCPCS: Performed by: STUDENT IN AN ORGANIZED HEALTH CARE EDUCATION/TRAINING PROGRAM

## 2025-02-04 RX ORDER — ATORVASTATIN CALCIUM 80 MG/1
80 TABLET, FILM COATED ORAL NIGHTLY
Qty: 30 TABLET | Refills: 3 | Status: SHIPPED | OUTPATIENT
Start: 2025-02-04

## 2025-02-04 RX ORDER — CLOPIDOGREL BISULFATE 75 MG/1
75 TABLET ORAL DAILY
Qty: 30 TABLET | Refills: 3 | Status: SHIPPED | OUTPATIENT
Start: 2025-02-05 | End: 2025-02-07

## 2025-02-04 RX ADMIN — TROSPIUM CHLORIDE 20 MG: 20 TABLET, FILM COATED ORAL at 16:37

## 2025-02-04 RX ADMIN — CLOBETASOL PROPIONATE CREAM USP, 0.05%: 0.5 CREAM TOPICAL at 10:05

## 2025-02-04 RX ADMIN — HEPARIN SODIUM 12 UNITS/KG/HR: 10000 INJECTION, SOLUTION INTRAVENOUS at 00:20

## 2025-02-04 RX ADMIN — ASPIRIN 81 MG: 81 TABLET, CHEWABLE ORAL at 10:04

## 2025-02-04 RX ADMIN — Medication 2 PUFF: at 09:38

## 2025-02-04 RX ADMIN — ATORVASTATIN CALCIUM 80 MG: 80 TABLET, FILM COATED ORAL at 20:38

## 2025-02-04 RX ADMIN — HEPARIN SODIUM 16 UNITS/KG/HR: 10000 INJECTION, SOLUTION INTRAVENOUS at 19:38

## 2025-02-04 RX ADMIN — HEPARIN SODIUM 4000 UNITS: 1000 INJECTION INTRAVENOUS; SUBCUTANEOUS at 02:33

## 2025-02-04 RX ADMIN — Medication 2 PUFF: at 20:37

## 2025-02-04 RX ADMIN — SODIUM PHOSPHATE, MONOBASIC, MONOHYDRATE AND SODIUM PHOSPHATE, DIBASIC, ANHYDROUS 20 MMOL: 142; 276 INJECTION, SOLUTION INTRAVENOUS at 10:10

## 2025-02-04 RX ADMIN — CLOPIDOGREL BISULFATE 75 MG: 75 TABLET ORAL at 10:04

## 2025-02-04 RX ADMIN — TROSPIUM CHLORIDE 20 MG: 20 TABLET, FILM COATED ORAL at 10:04

## 2025-02-05 ENCOUNTER — TELEPHONE (OUTPATIENT)
Dept: CARDIOLOGY CLINIC | Age: 89
End: 2025-02-05

## 2025-02-05 LAB
ANTI-XA UNFRAC HEPARIN: 0.21 IU/ML (ref 0.3–0.7)
ANTI-XA UNFRAC HEPARIN: 0.54 IU/ML (ref 0.3–0.7)
ANTI-XA UNFRAC HEPARIN: 0.57 IU/ML (ref 0.3–0.7)
BASOPHILS # BLD: 0 K/UL (ref 0–0.2)
BASOPHILS NFR BLD: 0.6 %
DEPRECATED RDW RBC AUTO: 15.5 % (ref 12.4–15.4)
EOSINOPHIL # BLD: 0.2 K/UL (ref 0–0.6)
EOSINOPHIL NFR BLD: 3.2 %
HCT VFR BLD AUTO: 38.4 % (ref 40.5–52.5)
HGB BLD-MCNC: 12.9 G/DL (ref 13.5–17.5)
LYMPHOCYTES # BLD: 0.6 K/UL (ref 1–5.1)
LYMPHOCYTES NFR BLD: 9.7 %
MCH RBC QN AUTO: 31.9 PG (ref 26–34)
MCHC RBC AUTO-ENTMCNC: 33.6 G/DL (ref 31–36)
MCV RBC AUTO: 94.8 FL (ref 80–100)
MONOCYTES # BLD: 0.7 K/UL (ref 0–1.3)
MONOCYTES NFR BLD: 11.9 %
NEUTROPHILS # BLD: 4.7 K/UL (ref 1.7–7.7)
NEUTROPHILS NFR BLD: 74.6 %
PLATELET # BLD AUTO: 180 K/UL (ref 135–450)
PMV BLD AUTO: 10.4 FL (ref 5–10.5)
POC ACT LR: 196 SEC
RBC # BLD AUTO: 4.04 M/UL (ref 4.2–5.9)
REASON FOR REJECTION: NORMAL
REJECTED TEST: NORMAL
WBC # BLD AUTO: 6.3 K/UL (ref 4–11)

## 2025-02-05 PROCEDURE — 94761 N-INVAS EAR/PLS OXIMETRY MLT: CPT

## 2025-02-05 PROCEDURE — 99232 SBSQ HOSP IP/OBS MODERATE 35: CPT | Performed by: INTERNAL MEDICINE

## 2025-02-05 PROCEDURE — 94640 AIRWAY INHALATION TREATMENT: CPT

## 2025-02-05 PROCEDURE — 6360000002 HC RX W HCPCS: Performed by: STUDENT IN AN ORGANIZED HEALTH CARE EDUCATION/TRAINING PROGRAM

## 2025-02-05 PROCEDURE — 6370000000 HC RX 637 (ALT 250 FOR IP): Performed by: INTERNAL MEDICINE

## 2025-02-05 PROCEDURE — 85025 COMPLETE CBC W/AUTO DIFF WBC: CPT

## 2025-02-05 PROCEDURE — 85520 HEPARIN ASSAY: CPT

## 2025-02-05 PROCEDURE — 2000000000 HC ICU R&B

## 2025-02-05 PROCEDURE — 6370000000 HC RX 637 (ALT 250 FOR IP): Performed by: STUDENT IN AN ORGANIZED HEALTH CARE EDUCATION/TRAINING PROGRAM

## 2025-02-05 RX ADMIN — TROSPIUM CHLORIDE 20 MG: 20 TABLET, FILM COATED ORAL at 15:14

## 2025-02-05 RX ADMIN — ASPIRIN 81 MG: 81 TABLET, CHEWABLE ORAL at 08:23

## 2025-02-05 RX ADMIN — CLOBETASOL PROPIONATE CREAM USP, 0.05%: 0.5 CREAM TOPICAL at 20:16

## 2025-02-05 RX ADMIN — Medication 2 PUFF: at 12:12

## 2025-02-05 RX ADMIN — ATORVASTATIN CALCIUM 80 MG: 80 TABLET, FILM COATED ORAL at 20:16

## 2025-02-05 RX ADMIN — Medication 2 PUFF: at 22:39

## 2025-02-05 RX ADMIN — HEPARIN SODIUM 18 UNITS/KG/HR: 10000 INJECTION, SOLUTION INTRAVENOUS at 17:22

## 2025-02-05 RX ADMIN — CLOBETASOL PROPIONATE CREAM USP, 0.05%: 0.5 CREAM TOPICAL at 08:24

## 2025-02-05 RX ADMIN — CLOPIDOGREL BISULFATE 75 MG: 75 TABLET ORAL at 08:23

## 2025-02-05 RX ADMIN — TROSPIUM CHLORIDE 20 MG: 20 TABLET, FILM COATED ORAL at 08:23

## 2025-02-05 NOTE — TELEPHONE ENCOUNTER
Pt wife Robina called wanting to schedule appt with DCE, Robina would like to know the following for a piece of mind:    When does the pt need to get seen for follow up for stent placement?  When does the pt need to get dental clearance?  When will the pt get scheduled for a TAVR?  Would it be possible for the pt to go to rehab upon getting discharged?    Pls advise Robina at [phone]

## 2025-02-06 LAB
ALBUMIN SERPL-MCNC: 3.7 G/DL (ref 3.4–5)
ANION GAP SERPL CALCULATED.3IONS-SCNC: 8 MMOL/L (ref 3–16)
ANTI-XA UNFRAC HEPARIN: 0.52 IU/ML (ref 0.3–0.7)
BASOPHILS # BLD: 0 K/UL (ref 0–0.2)
BASOPHILS NFR BLD: 0.7 %
BUN SERPL-MCNC: 18 MG/DL (ref 7–20)
CALCIUM SERPL-MCNC: 9.3 MG/DL (ref 8.3–10.6)
CHLORIDE SERPL-SCNC: 106 MMOL/L (ref 99–110)
CO2 SERPL-SCNC: 23 MMOL/L (ref 21–32)
CREAT SERPL-MCNC: 0.8 MG/DL (ref 0.8–1.3)
DEPRECATED RDW RBC AUTO: 15.8 % (ref 12.4–15.4)
EOSINOPHIL # BLD: 0.1 K/UL (ref 0–0.6)
EOSINOPHIL NFR BLD: 2.3 %
GFR SERPLBLD CREATININE-BSD FMLA CKD-EPI: 84 ML/MIN/{1.73_M2}
GLUCOSE SERPL-MCNC: 93 MG/DL (ref 70–99)
HCT VFR BLD AUTO: 37.4 % (ref 40.5–52.5)
HGB BLD-MCNC: 12.5 G/DL (ref 13.5–17.5)
LYMPHOCYTES # BLD: 0.8 K/UL (ref 1–5.1)
LYMPHOCYTES NFR BLD: 12.3 %
MCH RBC QN AUTO: 32 PG (ref 26–34)
MCHC RBC AUTO-ENTMCNC: 33.5 G/DL (ref 31–36)
MCV RBC AUTO: 95.7 FL (ref 80–100)
MONOCYTES # BLD: 0.8 K/UL (ref 0–1.3)
MONOCYTES NFR BLD: 13.4 %
NEUTROPHILS # BLD: 4.4 K/UL (ref 1.7–7.7)
NEUTROPHILS NFR BLD: 71.3 %
PHOSPHATE SERPL-MCNC: 2.2 MG/DL (ref 2.5–4.9)
PLATELET # BLD AUTO: 175 K/UL (ref 135–450)
PMV BLD AUTO: 10.6 FL (ref 5–10.5)
POTASSIUM SERPL-SCNC: 4.1 MMOL/L (ref 3.5–5.1)
RBC # BLD AUTO: 3.91 M/UL (ref 4.2–5.9)
SODIUM SERPL-SCNC: 137 MMOL/L (ref 136–145)
WBC # BLD AUTO: 6.2 K/UL (ref 4–11)

## 2025-02-06 PROCEDURE — 6370000000 HC RX 637 (ALT 250 FOR IP): Performed by: INTERNAL MEDICINE

## 2025-02-06 PROCEDURE — 97110 THERAPEUTIC EXERCISES: CPT

## 2025-02-06 PROCEDURE — 97116 GAIT TRAINING THERAPY: CPT

## 2025-02-06 PROCEDURE — 6370000000 HC RX 637 (ALT 250 FOR IP): Performed by: STUDENT IN AN ORGANIZED HEALTH CARE EDUCATION/TRAINING PROGRAM

## 2025-02-06 PROCEDURE — 99233 SBSQ HOSP IP/OBS HIGH 50: CPT

## 2025-02-06 PROCEDURE — 85025 COMPLETE CBC W/AUTO DIFF WBC: CPT

## 2025-02-06 PROCEDURE — 85520 HEPARIN ASSAY: CPT

## 2025-02-06 PROCEDURE — 94640 AIRWAY INHALATION TREATMENT: CPT

## 2025-02-06 PROCEDURE — 80069 RENAL FUNCTION PANEL: CPT

## 2025-02-06 PROCEDURE — 2000000000 HC ICU R&B

## 2025-02-06 PROCEDURE — 94761 N-INVAS EAR/PLS OXIMETRY MLT: CPT

## 2025-02-06 RX ADMIN — TROSPIUM CHLORIDE 20 MG: 20 TABLET, FILM COATED ORAL at 11:01

## 2025-02-06 RX ADMIN — ATORVASTATIN CALCIUM 80 MG: 80 TABLET, FILM COATED ORAL at 20:45

## 2025-02-06 RX ADMIN — APIXABAN 2.5 MG: 2.5 TABLET, FILM COATED ORAL at 20:45

## 2025-02-06 RX ADMIN — ASPIRIN 81 MG: 81 TABLET, CHEWABLE ORAL at 11:01

## 2025-02-06 RX ADMIN — CLOPIDOGREL BISULFATE 75 MG: 75 TABLET ORAL at 11:01

## 2025-02-06 RX ADMIN — CLOBETASOL PROPIONATE CREAM USP, 0.05%: 0.5 CREAM TOPICAL at 20:46

## 2025-02-06 RX ADMIN — Medication 2 PUFF: at 08:00

## 2025-02-06 RX ADMIN — TROSPIUM CHLORIDE 20 MG: 20 TABLET, FILM COATED ORAL at 20:45

## 2025-02-06 RX ADMIN — Medication 2 PUFF: at 19:10

## 2025-02-06 RX ADMIN — APIXABAN 2.5 MG: 2.5 TABLET, FILM COATED ORAL at 11:01

## 2025-02-06 ASSESSMENT — PAIN SCALES - GENERAL: PAINLEVEL_OUTOF10: 0

## 2025-02-06 NOTE — DISCHARGE INSTR - COC
Continuity of Care Form  HF Pathway  _x_ Cardiovascular Assessment. Titrate O2 to keep SaO2 greater than 90%    _x_ Daily Weights- Baseline Wt:148 lb   Call MD if:   3 pound weight gain or loss in one day OR 5 pound weight gain in one week       _x_No added salt diet (3-4 G sodium) and 64 oz fluid restriction    _x_ Med List attached:   Hold Coreg/Metoprolol if HR less than 45 or patient symptomatic*   Hold ACE/ARB if SBP less than 85 or patient symptomatic*   Do not hold Spironolactone (aldactone) for hypotension/bradycardia   Call MD for questions: [facility] [phone]    _x_Follow up appointment with cardiology: date/time: 25          Patient Name: Tommy Amato   :  1935  MRN:  [id number]    Admit date:  2025  Discharge date:  2025    Code Status Order: Full Code   Advance Directives:   Advance Care Flowsheet Documentation             Admitting Physician:  Kishan Barr MD  PCP: Columba Oviedo MD    Discharging Nurse: Sho  Discharging Hospital Unit/Room#: CVU-2904/2904-01  Discharging Unit Phone Number: [phone]    Emergency Contact:   Extended Emergency Contact Information  Primary Emergency Contact: Robina Amato  Address: [address]  [address]           [place] [id number] [facility]  Home Phone: [phone]  Relation: Spouse  Secondary Emergency Contact: jarrod poon  Home Phone: [phone]  Relation: Child    Past Surgical History:  Past Surgical History:   Procedure Laterality Date    CARDIAC PROCEDURE N/A 2025    Right heart cath performed by Brent Farooq MD at [facility] CARDIAC CATH LAB    CARDIAC PROCEDURE N/A 2025    Left heart cath / coronary angiography performed by Brent Farooq MD at [facility] CARDIAC CATH LAB    CARDIAC PROCEDURE N/A 2/3/2025    Insert stent mary coronary performed by Bartolome Angel MD at [facility] CARDIAC CATH LAB    CATARACT REMOVAL Bilateral May '14    DENTAL SURGERY      DENTAL SURGERY  2020    Dental implant

## 2025-02-07 ENCOUNTER — TELEPHONE (OUTPATIENT)
Dept: CASE MANAGEMENT | Age: 89
End: 2025-02-07

## 2025-02-07 VITALS
BODY MASS INDEX: 23.99 KG/M2 | TEMPERATURE: 97.8 F | SYSTOLIC BLOOD PRESSURE: 155 MMHG | HEIGHT: 66 IN | RESPIRATION RATE: 19 BRPM | WEIGHT: 149.25 LBS | HEART RATE: 90 BPM | DIASTOLIC BLOOD PRESSURE: 82 MMHG | OXYGEN SATURATION: 95 %

## 2025-02-07 PROBLEM — R91.1 LUNG NODULE SEEN ON IMAGING STUDY: Status: ACTIVE | Noted: 2025-02-07

## 2025-02-07 PROBLEM — K86.2 PANCREAS CYST: Status: ACTIVE | Noted: 2025-02-07

## 2025-02-07 PROCEDURE — 6370000000 HC RX 637 (ALT 250 FOR IP): Performed by: STUDENT IN AN ORGANIZED HEALTH CARE EDUCATION/TRAINING PROGRAM

## 2025-02-07 PROCEDURE — 6370000000 HC RX 637 (ALT 250 FOR IP): Performed by: INTERNAL MEDICINE

## 2025-02-07 RX ORDER — CLOPIDOGREL BISULFATE 75 MG/1
75 TABLET ORAL DAILY
Qty: 30 TABLET | Refills: 3 | Status: SHIPPED | OUTPATIENT
Start: 2025-02-07

## 2025-02-07 RX ADMIN — TROSPIUM CHLORIDE 20 MG: 20 TABLET, FILM COATED ORAL at 06:28

## 2025-02-07 RX ADMIN — CLOPIDOGREL BISULFATE 75 MG: 75 TABLET ORAL at 09:33

## 2025-02-07 RX ADMIN — APIXABAN 2.5 MG: 2.5 TABLET, FILM COATED ORAL at 09:33

## 2025-02-07 NOTE — PROGRESS NOTES
Hospitalist Progress Note    Name:  Tommy Amato    /Age/Sex: 1935  (89 y.o. male)  MRN & CSN:  [id number] & [id number]    PCP: Columba Oviedo MD    Date of Admission: 2025    Patient Status:  Inpatient     Chief Complaint: No chief complaint on file.      Hospital Course:   Patient transferred from [facility] after he was found to have NSTEMI. Had RHC and LHC on  showed severe obstructive CAD involving ostial and mid LAD as well as Lcx with severe aortic stenosis. Transferred to [facility] for possible CABG. Cardiology consulted.    Subjective:  Today is:  Hospital Day: 8.  Patient seen and examined in CVU-290/290.     Sitting up in bed. No pain. No bleeding.    Wife at bedside and updated.    Medications:  Reviewed    Infusion Medications       Scheduled Medications    apixaban  2.5 mg Oral BID    clopidogrel  75 mg Oral Daily    budesonide-formoterol  2 puff Inhalation BID RT    clobetasol   Topical BID    trospium  20 mg Oral BID AC    atorvastatin  80 mg Oral Nightly     PRN Meds: acetaminophen, bacitracin-polymyxin B, sodium phosphate 15 mmol in sodium chloride 0.9 % 250 mL IVPB, magnesium sulfate, potassium chloride **OR** potassium alternative oral replacement **OR** potassium chloride, bisacodyl, [DISCONTINUED] acetaminophen **OR** acetaminophen, ondansetron    No intake or output data in the 24 hours ending 25      Physical Exam Performed:    /66   Pulse 80   Temp 98.3 °F (36.8 °C) (Temporal)   Resp 18   Ht 1.676 m (5' 5.98\")   Wt 67.5 kg (148 lb 13 oz)   SpO2 94%   BMI 24.03 kg/m²     General appearance: No apparent distress, appears stated age and cooperative.   HEENT: Pupils equal, round, and reactive to light. Conjunctivae/corneas clear.  Neck: Supple, with full range of motion. No jugular venous distention. Trachea midline.  Respiratory:  Normal respiratory effort. Clear to auscultation, bilaterally without 
      Hospitalist Progress Note    Name:  Tommy Amato    /Age/Sex: 1935  (89 y.o. male)  MRN & CSN:  [id number] & [id number]    PCP: Columba Oviedo MD    Date of Admission: 2025    Patient Status:  Inpatient     Chief Complaint: No chief complaint on file.      Hospital Course:   Patient transferred from [facility] after he was found to have NSTEMI. Had RHC and LHC on  showed severe obstructive CAD involving ostial and mid LAD as well as Lcx with severe aortic stenosis. Transferred to [facility] for possible CABG. Cardiology consulted.    Subjective:  Today is:  Hospital Day: 7.  Patient seen and examined in CVU-290/290.     Sitting up in bed. No pain. No bleeding.    Wife at bedside and updated.    Medications:  Reviewed    Infusion Medications    heparin (PORCINE) Infusion 18 Units/kg/hr (25 0714)     Scheduled Medications    clopidogrel  75 mg Oral Daily    budesonide-formoterol  2 puff Inhalation BID RT    clobetasol   Topical BID    trospium  20 mg Oral BID AC    aspirin  81 mg Oral Daily    atorvastatin  80 mg Oral Nightly     PRN Meds: acetaminophen, bacitracin-polymyxin B, sodium phosphate 15 mmol in sodium chloride 0.9 % 250 mL IVPB, magnesium sulfate, potassium chloride **OR** potassium alternative oral replacement **OR** potassium chloride, bisacodyl, [DISCONTINUED] acetaminophen **OR** acetaminophen, ondansetron, heparin (porcine), heparin (porcine)      Intake/Output Summary (Last 24 hours) at 2025 1402  Last data filed at 2025 0820  Gross per 24 hour   Intake 1185.81 ml   Output 300 ml   Net 885.81 ml       Physical Exam Performed:    /62   Pulse 87   Temp 98.6 °F (37 °C) (Temporal)   Resp 16   Ht 1.676 m (5' 5.98\")   Wt 67.5 kg (148 lb 13 oz)   SpO2 99%   BMI 24.03 kg/m²     General appearance: No apparent distress, appears stated age and cooperative.   HEENT: Pupils equal, round, and reactive to light. Conjunctivae/corneas 
      Hospitalist Progress Note    Name:  Tommy Amato    /Age/Sex: 1935  (89 y.o. male)  MRN & CSN:  [id number] & [id number]    PCP: Columba Oviedo MD    Date of Admission: 2025    Patient Status:  Inpatient     Chief Complaint: No chief complaint on file.      Hospital Course:   Patient transferred from [facility] after he was found to have NSTEMI. Had RHC and LHC on  showed severe obstructive CAD involving ostial and mid LAD as well as Lcx with severe aortic stenosis. Transferred to [facility] for possible CABG. Cardiology consulted.    Subjective:  Today is:  Hospital Day: 5.  Patient seen and examined in CVU-2904/290.     Sitting up in bed. No pain. No bleeding.    Wife at bedside and updated.    Medications:  Reviewed    Infusion Medications    heparin (PORCINE) Infusion Stopped (25 1143)     Scheduled Medications    budesonide-formoterol  2 puff Inhalation BID RT    clobetasol   Topical BID    trospium  20 mg Oral BID AC    aspirin  81 mg Oral Daily    atorvastatin  80 mg Oral Nightly    [Held by provider] clopidogrel  75 mg Oral Daily     PRN Meds: bacitracin-polymyxin B, sodium phosphate 15 mmol in sodium chloride 0.9 % 250 mL IVPB, magnesium sulfate, potassium chloride **OR** potassium alternative oral replacement **OR** potassium chloride, bisacodyl, acetaminophen **OR** acetaminophen, ondansetron, heparin (porcine), heparin (porcine)      Intake/Output Summary (Last 24 hours) at 2/3/2025 1432  Last data filed at 2/3/2025 1414  Gross per 24 hour   Intake 968.07 ml   Output 1215 ml   Net -246.93 ml       Physical Exam Performed:    BP (!) 151/103   Pulse 81   Temp 97.3 °F (36.3 °C) (Oral)   Resp 12   Ht 1.676 m (5' 5.98\")   Wt 66.3 kg (146 lb 1.6 oz)   SpO2 100%   BMI 23.59 kg/m²     General appearance: No apparent distress, appears stated age and cooperative.   HEENT: Pupils equal, round, and reactive to light. Conjunctivae/corneas clear.  Neck: Supple, 
      Hospitalist Progress Note    Name:  Tommy Amato    /Age/Sex: 1935  (89 y.o. male)  MRN & CSN:  [id number] & [id number]    PCP: Columba Oviedo MD    Date of Admission: 2025    Patient Status:  Inpatient     Chief Complaint: No chief complaint on file.      Hospital Course:   Patient transferred from [facility] after he was found to have NSTEMI. Had RHC and LHC on  showed severe obstructive CAD involving ostial and mid LAD as well as Lcx with severe aortic stenosis. Transferred to [facility] for possible CABG. Cardiology consulted.    Subjective:  Today is:  Hospital Day: 4.  Patient seen and examined in CVU-2904/290-.     Lying in bed. Doing well. Eating okay. No pain. No signs of bleeding.  No issues overnight.      Medications:  Reviewed    Infusion Medications    heparin (PORCINE) Infusion 18 Units/kg/hr (25 0904)     Scheduled Medications    budesonide-formoterol  2 puff Inhalation BID RT    clobetasol   Topical BID    trospium  20 mg Oral BID AC    aspirin  81 mg Oral Daily    atorvastatin  80 mg Oral Nightly    [Held by provider] clopidogrel  75 mg Oral Daily     PRN Meds: sodium phosphate 15 mmol in sodium chloride 0.9 % 250 mL IVPB, magnesium sulfate, potassium chloride **OR** potassium alternative oral replacement **OR** potassium chloride, bisacodyl, acetaminophen **OR** acetaminophen, ondansetron, heparin (porcine), heparin (porcine)      Intake/Output Summary (Last 24 hours) at 2025 1329  Last data filed at 2025 1100  Gross per 24 hour   Intake 1528.5 ml   Output 1300 ml   Net 228.5 ml       Physical Exam Performed:    /68   Pulse 73   Temp 97.1 °F (36.2 °C) (Temporal)   Resp 16   Ht 1.676 m (5' 5.98\")   Wt 66.3 kg (146 lb 1.6 oz)   SpO2 100%   BMI 23.59 kg/m²     General appearance: No apparent distress, appears stated age and cooperative.   HEENT: Pupils equal, round, and reactive to light. Conjunctivae/corneas clear.  Neck: Supple, 
      Hospitalist Progress Note    Name:  Tommy Amato    /Age/Sex: 1935  (89 y.o. male)  MRN & CSN:  [id number] & [id number]    PCP: Columba Oviedo MD    Date of Admission: 2025    Patient Status:  Inpatient     Chief Complaint: No chief complaint on file.      Hospital Course:   Patient transferred from [facility] after he was found to have NSTEMI. Had RHC and LHC on  showed severe obstructive CAD involving ostial and mid LAD as well as Lcx with severe aortic stenosis. Transferred to [facility] for possible CABG. Cardiology consulted.    Subjective:  Today is:  Hospital Day: 3.  Patient seen and examined in CVU-2904/290-.     Lying in bed. Doing well. Eating okay. No pain. No signs of bleeding.      Medications:  Reviewed    Infusion Medications    heparin (PORCINE) Infusion 16 Units/kg/hr (25 1051)     Scheduled Medications    budesonide-formoterol  2 puff Inhalation BID RT    clobetasol   Topical BID    trospium  20 mg Oral BID AC    aspirin  81 mg Oral Daily    atorvastatin  80 mg Oral Nightly    [Held by provider] clopidogrel  75 mg Oral Daily    ferric gluconate  125 mg IntraVENous Q24H     PRN Meds: sodium phosphate 15 mmol in sodium chloride 0.9 % 250 mL IVPB, magnesium sulfate, potassium chloride **OR** potassium alternative oral replacement **OR** potassium chloride, bisacodyl, acetaminophen **OR** acetaminophen, ondansetron, heparin (porcine), heparin (porcine)      Intake/Output Summary (Last 24 hours) at 2025 1104  Last data filed at 2025 0000  Gross per 24 hour   Intake 780 ml   Output 850 ml   Net -70 ml       Physical Exam Performed:    /87   Pulse 88   Temp 97.4 °F (36.3 °C) (Temporal)   Resp 18   Ht 1.676 m (5' 5.98\")   Wt 66.6 kg (146 lb 11.5 oz)   SpO2 100%   BMI 23.69 kg/m²     General appearance: No apparent distress, appears stated age and cooperative.   HEENT: Pupils equal, round, and reactive to light. Conjunctivae/corneas 
      Hospitalist Progress Note    Name:  Tommy Amato    /Age/Sex: 1935  (89 y.o. male)  MRN & CSN:  [id number] & [id number]    PCP: Columba Oviedo MD    Date of Admission: 2025    Patient Status:  Inpatient     Chief Complaint: No chief complaint on file.      Hospital Course:   Patient transferred from [facility] after he was found to have NSTEMI. Had RHC and LHC on  showed severe obstructive CAD involving ostial and mid LAD as well as Lcx with severe aortic stenosis. Transferred to [facility] for possible CABG. Cardiology consulted.    Subjective:  Today is:  Hospital Day: 6.  Patient seen and examined in CVU-290/290.     Sitting up in bed. No pain. No bleeding.    Wife at bedside and updated.    Medications:  Reviewed    Infusion Medications    heparin (PORCINE) Infusion 16 Units/kg/hr (25)     Scheduled Medications    clopidogrel  75 mg Oral Daily    budesonide-formoterol  2 puff Inhalation BID RT    clobetasol   Topical BID    trospium  20 mg Oral BID AC    aspirin  81 mg Oral Daily    atorvastatin  80 mg Oral Nightly     PRN Meds: acetaminophen, bacitracin-polymyxin B, sodium phosphate 15 mmol in sodium chloride 0.9 % 250 mL IVPB, magnesium sulfate, potassium chloride **OR** potassium alternative oral replacement **OR** potassium chloride, bisacodyl, [DISCONTINUED] acetaminophen **OR** acetaminophen, ondansetron, heparin (porcine), heparin (porcine)      Intake/Output Summary (Last 24 hours) at 2025 210  Last data filed at 2025 1824  Gross per 24 hour   Intake 1425.81 ml   Output 1150 ml   Net 275.81 ml       Physical Exam Performed:    BP (!) 94/57   Pulse 84   Temp 97.4 °F (36.3 °C) (Temporal)   Resp 16   Ht 1.676 m (5' 5.98\")   Wt 66.3 kg (146 lb 1.6 oz)   SpO2 96%   BMI 23.60 kg/m²     General appearance: No apparent distress, appears stated age and cooperative.   HEENT: Pupils equal, round, and reactive to light. Conjunctivae/corneas 
    [facility]  H+P  Consult  OP Visit  FU Visit   CC/INTERVAL HX   Admit 1/30/2025   CC AS, CAD, HTN   Subjective Doing well this AM.  Pleasantly confused which is baseline.  Denies cp, sob.     Tele Reviewed available   EXAM   VS /89   Pulse 91   Temp 97.3 °F (36.3 °C) (Temporal)   Resp 16   Ht 1.676 m (5' 5.98\")   Wt 66.3 kg (146 lb 1.6 oz)   SpO2 94%   BMI 23.59 kg/m²    Gen Alert, coop, Ødistress Pulse 2+ and symmetric Head NC, AT, Ø abnorm   Heart RRR, 3/6 Abd  Soft, NT, +BS, Ømass Eyes PER, conj/corn clr   Lung CTAB, unlab, ØDTP Nck/Thr S/s, tm, nt, Øbru/jvd, lmt Nose Nares, Ø drain, nt   Ext Ext nl, AT,  ØC/C/E Neuro Nl gross m/s exam Lymph No cerv/ax LA   Ch Wall NT, no deform Skin Col/txt/trg nl, Ørash/les Psych Nl mood/affect      MEDICATIONS   Relevant cardiac medications Reviewed in EPIC   LABS   Hgb Lab Results   Component Value Date    HGB 13.2 (L) 02/03/2025      Cr Lab Results   Component Value Date    CREATININE 0.8 02/03/2025      Trop Lab Results   Component Value Date    CKTOTAL 73 11/28/2012    TROPHS 75 (H) 01/27/2025    TROPHS 73 (H) 01/27/2025    TROPHS 75 (H) 01/27/2025       ASSESSMENT TIME   More than 35 minutes spent reviewing patient chart (including but not limited to notes, labs, imaging and other testing), interviewing patient and/or family members, performing a physical exam, documentation of my findings above and subsequent follow-up of ordered testing.  More than 50% of that time spent face to face with patient discussing clinical condition and counseling regarding treatment plan.     ASSESSMENT AND PLAN   *AS  Status severe  TTE 35% AS MG 27, SVI 29, SHAKIRA 1.1, mild AI, mod MR, MD, mild-mod TR  Plan TAVR workup ongoing   Needs outpatient dental evaluation  *DVT  Status  On heparin gtt  Plan Transition to eliquis low dose on discharge and delete asa 81  *CAD  Status Stable  [facility] Severe obstructive CAD ([place])  Plan S/p PCI of LAD/Cx yesterday without 
    [facility]  H+P  Consult  OP Visit  FU Visit   CC/INTERVAL HX   Admit 1/30/2025   CC AS, CAD, HTN   Subjective Doing well this AM.  Long discussion regarding heart issues and therapy options.  Patient and wife would like to proceed with PCI with staged TAVR in future.    Tele Reviewed available   EXAM   VS /89   Pulse 91   Temp 97.3 °F (36.3 °C) (Temporal)   Resp 16   Ht 1.676 m (5' 5.98\")   Wt 66.3 kg (146 lb 1.6 oz)   SpO2 94%   BMI 23.59 kg/m²    Gen Alert, coop, Ødistress Pulse 2+ and symmetric Head NC, AT, Ø abnorm   Heart RRR, 3/6 Abd  Soft, NT, +BS, Ømass Eyes PER, conj/corn clr   Lung CTAB, unlab, ØDTP Nck/Thr S/s, tm, nt, Øbru/jvd, lmt Nose Nares, Ø drain, nt   Ext Ext nl, AT,  ØC/C/E Neuro Nl gross m/s exam Lymph No cerv/ax LA   Ch Wall NT, no deform Skin Col/txt/trg nl, Ørash/les Psych Nl mood/affect      MEDICATIONS   Relevant cardiac medications Reviewed in EPIC   LABS   Hgb Lab Results   Component Value Date    HGB 13.2 (L) 02/03/2025      Cr Lab Results   Component Value Date    CREATININE 0.8 02/03/2025      Trop Lab Results   Component Value Date    CKTOTAL 73 11/28/2012    TROPHS 75 (H) 01/27/2025    TROPHS 73 (H) 01/27/2025    TROPHS 75 (H) 01/27/2025       ASSESSMENT TIME   More than 35 minutes spent reviewing patient chart (including but not limited to notes, labs, imaging and other testing), interviewing patient and/or family members, performing a physical exam, documentation of my findings above and subsequent follow-up of ordered testing.  More than 50% of that time spent face to face with patient discussing clinical condition and counseling regarding treatment plan.     ASSESSMENT AND PLAN   *AS  Status severe  TTE 35% AS MG 27, SVI 29, SHAKIRA 1.1, mild AI, mod MR, CT, mild-mod TR  Plan TAVR workup in progress, [facility] with PCI today  *CAD  Status Stable  [facility] Severe obstructive CAD ([place])  Plan LHC with PCI today  *HTN  Status controlled  Plan Continue antihypertensives 
    Pharmacy to check patient copay for  Eliquis or Xarelto    Copay for patient will be: $47/month for either    Pharmacy will continue to follow the decision on therapy and  the patient if appropriate.       Yordan Canela, PharmD  PGY-1 Pharmacy Resident   [facility]  [id number]    
    [facility]  H+P  Consult  OP Visit  FU Visit   CC/INTERVAL HX   Admit 1/30/2025   CC AS, CAD, HTN   Subjective Doing well this AM.  No new concerns.  Awaiting SNF   Tele Reviewed available   EXAM   VS /89   Pulse 91   Temp 97.3 °F (36.3 °C) (Temporal)   Resp 16   Ht 1.676 m (5' 5.98\")   Wt 66.3 kg (146 lb 1.6 oz)   SpO2 94%   BMI 23.59 kg/m²    Gen Alert, coop, Ødistress Pulse 2+ and symmetric Head NC, AT, Ø abnorm   Heart RRR, 3/6 Abd  Soft, NT, +BS, Ømass Eyes PER, conj/corn clr   Lung CTAB, unlab, ØDTP Nck/Thr S/s, tm, nt, Øbru/jvd, lmt Nose Nares, Ø drain, nt   Ext Ext nl, AT,  ØC/C/E Neuro Nl gross m/s exam Lymph No cerv/ax LA   Ch Wall NT, no deform Skin Col/txt/trg nl, Ørash/les Psych Nl mood/affect      MEDICATIONS   Relevant cardiac medications Reviewed in EPIC   LABS   Hgb Lab Results   Component Value Date    HGB 13.2 (L) 02/03/2025      Cr Lab Results   Component Value Date    CREATININE 0.8 02/03/2025      Trop Lab Results   Component Value Date    CKTOTAL 73 11/28/2012    TROPHS 75 (H) 01/27/2025    TROPHS 73 (H) 01/27/2025    TROPHS 75 (H) 01/27/2025       ASSESSMENT TIME   More than 35 minutes spent reviewing patient chart (including but not limited to notes, labs, imaging and other testing), interviewing patient and/or family members, performing a physical exam, documentation of my findings above and subsequent follow-up of ordered testing.  More than 50% of that time spent face to face with patient discussing clinical condition and counseling regarding treatment plan.     ASSESSMENT AND PLAN   *AS  Status severe  TTE 35% AS MG 27, SVI 29, SHAKIRA 1.1, mild AI, mod MR, CO, mild-mod TR  Plan TAVR workup ongoing   Needs outpatient dental evaluation  *DVT  Status  On heparin gtt  Plan Transition to eliquis low dose on discharge and delete asa 81  *CAD  Status Stable  [facility] Severe obstructive CAD ([place])  Plan S/p PCI of LAD/Cx without complication   Continue plavix on discharge 
  [facility] - Inpatient Rehabilitation Department   Phone: [phone]    Physical Therapy    [] Initial Evaluation            [x] Daily Treatment Note         [] Discharge Summary      Patient: Tommy Amato   : 1935   MRN: [id number]   Date of Service:  2025  Admitting Diagnosis: Coronary artery disease, occlusive  Current Admission Summary: Tommy Amato is a 89 y.o. patient PMHx HTN who presented to the hospital with complaints of shortness of breath. Found to have reduced EF, concern for low flow low gradient severe AS. Underwent LHC/RHC with severe multivessel CAD, valve area of 0.4, elevated filling pressures and reduced cardiac index. He was started on a lasix gtt and low dose dobutamine.    *Workup for PCI/TAVR  Past Medical History:  has a past medical history of Angioma, Arthritis, Bunion, Burn, CAD in native artery, Cataract, Cerebellar infarct (HCC), Deep vein thrombosis (HCC), Essential hypertension, benign, Ganglion cyst, Hammer toe, Heart failure (HCC), Hyperlipidemia, Inguinal hernia, Major depressive disorder, recurrent, mild, and Primary osteoarthritis of right knee.  Past Surgical History:  has a past surgical history that includes hernia repair (); Varicose vein surgery (); Varicose vein surgery; Dental surgery; Inguinal hernia repair; Inguinal hernia repair (2011); eye surgery; Cataract removal (Bilateral, May '14); Dental surgery (2020); Hand surgery; Mohs surgery (Left, 2024); Cardiac procedure (N/A, 2025); Cardiac procedure (N/A, 2025); and Cardiac procedure (N/A, 2/3/2025).    Discharge Recommendations: Tommy Amato scored a 17/24 on the AM-PAC short mobility form. Current research shows that an AM-PAC score of 17 or less is typically not associated with a discharge to the patient's home setting. Based on the patient's AM-PAC score and their current functional mobility deficits, it is recommended that the patient have 3-5 
  [facility] - Inpatient Rehabilitation Department   Phone: [phone]    Physical Therapy    [x] Initial Evaluation            [] Daily Treatment Note         [] Discharge Summary      Patient: Tommy Amato   : 1935   MRN: [id number]   Date of Service:  2025  Admitting Diagnosis: Coronary artery disease, occlusive  Current Admission Summary: Tommy Amato is a 89 y.o. patient PMHx HTN who presented to the hospital with complaints of shortness of breath. Found to have reduced EF, concern for low flow low gradient severe AS. Underwent LHC/RHC with severe multivessel CAD, valve area of 0.4, elevated filling pressures and reduced cardiac index. He was started on a lasix gtt and low dose dobutamine.    *Workup for PCI/TAVR  Past Medical History:  has a past medical history of Angioma, Arthritis, Bunion, Burn, CAD in native artery, Cataract, Cerebellar infarct (HCC), Deep vein thrombosis (HCC), Essential hypertension, benign, Ganglion cyst, Hammer toe, Heart failure (HCC), Hyperlipidemia, Inguinal hernia, Major depressive disorder, recurrent, mild, and Primary osteoarthritis of right knee.  Past Surgical History:  has a past surgical history that includes hernia repair (); Varicose vein surgery (); Varicose vein surgery; Dental surgery; Inguinal hernia repair; Inguinal hernia repair (2011); eye surgery; Cataract removal (Bilateral, May '14); Dental surgery (2020); Hand surgery; Mohs surgery (Left, 2024); Cardiac procedure (N/A, 2025); and Cardiac procedure (N/A, 2025).  Discharge Recommendations: Tommy Amato scored a 17/24 on the AM-PAC short mobility form. Current research shows that an AM-PAC score of 17 or less is typically not associated with a discharge to the patient's home setting. Based on the patient's AM-PAC score and their current functional mobility deficits, it is recommended that the patient have 3-5 sessions per week of Physical Therapy at 
  [facility] - Inpatient Rehabilitation Department   Phone: [phone]    Occupational Therapy    [x] Initial Evaluation            [] Daily Treatment Note         [] Discharge Summary      Patient: Tommy Amato   : 1935   MRN: [id number]   Date of Service:  2025    Admitting Diagnosis:  Coronary artery disease, occlusive  Current Admission Summary:  Tommy Amato is a 89 y.o. patient PMHx HTN who presented to the hospital with complaints of shortness of breath. Found to have reduced EF, concern for low flow low gradient severe AS. Underwent LHC/RHC with severe multivessel CAD, valve area of 0.4, elevated filling pressures and reduced cardiac index. He was started on a lasix gtt and low dose dobutamine.     *Workup for PCI/TAVR  Past Medical History:  has a past medical history of Angioma, Arthritis, Bunion, Burn, CAD in native artery, Cataract, Cerebellar infarct (HCC), Deep vein thrombosis (HCC), Essential hypertension, benign, Ganglion cyst, Hammer toe, Heart failure (HCC), Hyperlipidemia, Inguinal hernia, Major depressive disorder, recurrent, mild, and Primary osteoarthritis of right knee.  Past Surgical History:  has a past surgical history that includes hernia repair (); Varicose vein surgery (); Varicose vein surgery; Dental surgery; Inguinal hernia repair; Inguinal hernia repair (2011); eye surgery; Cataract removal (Bilateral, May '14); Dental surgery (2020); Hand surgery; Mohs surgery (Left, 2024); Cardiac procedure (N/A, 2025); and Cardiac procedure (N/A, 2025).    Discharge Recommendations: Tommy Amato scored a 17/24 on the AM-PAC ADL Inpatient form. Current research shows that an AM-PAC score of 17 or less is typically not associated with a discharge to the patient's home setting. Based on the patient's AM-PAC score and their current ADL deficits, it is recommended that the patient have 3-5 sessions per week of Occupational Therapy at d/c 
  [facility] - Inpatient Rehabilitation Department   Phone: [phone]    Occupational Therapy    [] Initial Evaluation            [x] Daily Treatment Note         [] Discharge Summary      Patient: Tommy Amato   : 1935   MRN: [id number]   Date of Service:  2025    Admitting Diagnosis:  Coronary artery disease, occlusive  Current Admission Summary:  Tommy Amato is a 89 y.o. patient PMHx HTN who presented to the hospital with complaints of shortness of breath. Found to have reduced EF, concern for low flow low gradient severe AS. Underwent LHC/RHC with severe multivessel CAD, valve area of 0.4, elevated filling pressures and reduced cardiac index. He was started on a lasix gtt and low dose dobutamine.     *Workup for PCI/TAVR  Past Medical History:  has a past medical history of Angioma, Arthritis, Bunion, Burn, CAD in native artery, Cataract, Cerebellar infarct (HCC), Deep vein thrombosis (HCC), Essential hypertension, benign,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

## 2025-02-07 NOTE — TELEPHONE ENCOUNTER
Columba Peres Dr., MD     Just wanted you to be aware that were was an Incidental pulmonary finding was on Tommy Amato 's 1/31/25 CT  where follow up was recommended. See Impression.       IMPRESSION:  1. No high-grade stenosis in the axis vasculature.  2. Solid nodules in the right lung measuring up to 7 mm.  Recommend follow-up  chest CT in 3-6 months per Fleischner criteria.  3. Similar moderate pleural effusions.  4. Few cystic pancreatic lesions measuring up to 1.1 cm.  Recommend follow-up  pancreas protocol MRI in 2 years.    Please make patient aware of your follow up recommendations.        Kacy Lara  Lung Navigator  [facility]  [phone]    Future Appointments   Date Time Provider Department Center   2/27/2025  9:45 AM Bartolome Angel MD  [facility]   3/18/2025 11:00 AM Columba Oviedo MD [facility]   3/25/2025  2:00 PM [facility]   9/25/2025  2:00 PM [facility]

## 2025-02-07 NOTE — CARE COORDINATION
01/31/25 0841   Readmission Assessment   Number of Days since last admission? 1-7 days   Previous Disposition Other (comment)  (Transferred from [facility] for CABG consult)   Who is being Interviewed Patient  (Chart reviewed/Transferred from [facility] for CABG consult)   What was the patient's/caregiver's perception as to why they think they needed to return back to the hospital? Other (Comment)  (Transferred from [facility] for CABG consult)   Did you visit your Primary Care Physician after you left the hospital, before you returned this time? No   Why weren't you able to visit your PCP? Other (Comment)  (Transferred from [facility] for CABG consult)   Did you see a specialist, such as Cardiac, Pulmonary, Orthopedic Physician, etc. after you left the hospital? Yes  (Transferred from [facility] for CABG consult)   Who advised the patient to return to the hospital? Other (Comment);Physician  (Transferred from [facility] for CABG consult)   Does the patient report anything that got in the way of taking their medications? No  (Transferred from [facility] for CABG consult)   In our efforts to provide the best possible care to you and others like you, can you think of anything that we could have done to help you after you left the hospital the first time, so that you might not have needed to return so soon? Other (Comment)  (Transferred from [facility] for CABG consult)       
   02/05/25 0926   IMM Letter   IMM Letter given to Patient/Family/Significant other/Guardian/POA/by: 2nd IMM Given   IMM Letter date given: 02/05/25   IMM Letter time given: 0830       
   02/07/25 1045   IMM Letter   IMM Letter given to Patient/Family/Significant other/Guardian/POA/by: IMM Given Within 4 hrs of D/C   IMM Letter date given: 02/07/25   IMM Letter time given: 1040       
CM met with pt and spouse earlier this afternoon.     CM explained that I had received a vm from Polly [phone] in admissions at [facility] and that she had spent an hour an a half with pt's insurance company and the only update they would give her is pre cert pending and they have up to 15 days to make a determination.    CM discussed cannot hold pt in the hospital that long. Cm reviewed therapy evals, feet pt walked and scores.     Spouse would like time to try to obtain private caregiver, she is calling [employer] and cm provided list and [facility] information, which she states she worked with them before and didn't like who they sent. She is agreeable to pt discharging home with hc tomorrow afternoon if pt does not get approval for snf. This will give her time to make arrangements.    Fe Cheatham, RN, BSN  [phone]   
CM reviewed chart at this time for discharge planning.    Pt transferred from [facility] for CVTS consult.    CM will follow for plan and discharge needs.    Fe Cheatham RN, BSN  [phone]   
CM spoke to [facility] [phone] in admissions at [facility] and pre cert is still pending.     Fe Cheatham RN, BSN  [phone]   
Case Management -  Discharge Note      Patient Name: Tommy Amato                   YOB: 1935  Room: [place] [address]            Readmission Risk (Low < 19, Mod (19-27), High > 27): Readmission Risk Score: 13.5    Current PCP: Columba Oviedo MD      (IMM) Important Message from Medicare:    Has pt received appropriate compliance notices before being discharged if required: yes  Compliance doc:  [x] 2nd IMM; [] Code 44 [] [place]  Date Given: 02/07/2025 Given By: Case Management     PT AM-PAC: 17 /24  OT AM-PAC: 19 /24    Patient/patient representative has been educated on the benefits of HHC and 4WW as well as the possible risks of declining recommended services. Patient/patient representative has acknowledged the information provided and decided on the following discharge plan. Patient/ patient representative has been provided freedom of choice regarding service provider, supported by basic dialogue that supports the patient's individualized plan of care/goals.    [facility]  [address] [place] [address] [address]  [place] [address]  Phone: [phone]  Fax: [phone]           [facility]  [address] [place] [address]  Phone:  [phone]  Fax: [phone]       [facility] agency notified of discharge:  [x] Yes [] No  [] NA    Family notified of discharge:  [x] Yes  [] No  [] NA    Facility notified of discharge:  [x] Yes  [] No  [] NA    Pt is being discharged with Outpt IV Antibiotics  [] Yes [] No  [x] NA  If yes, make sure MIRELA is faxed to [facility] agency, and meds are called in to pharmacy by RN from MIRELA orders only.      Financial    Payor: [employer]VANTAGE / Plan: [employer] CHOICE HMO / Product Type: *No Product type* /     Pharmacy:  Potential assistance Purchasing Medications:    Meds-to-Beds request: Yes      [facility] - [place], [place] - [address] 
RN notified CM that pt lives at [facility] and wants to go to their skilled facility.    CM sent referral over epic at this time.     CM sent message to [facility] liaison for updated therapy evaluations.     Pt still on heparin gtt at this time.     Fe Cheatham RN, BSN  [phone]   
VALENTINA called Lorin in admissions at the [facility] asking for any updates on pt's precert.  She stated the precert was still pending at this time.  She has my direct number and will call once authorization is approved.    Electronically signed by LUCINA Sandhu, CARMENW on 2/5/2025 at 2:20 PM    
VALENTINA met with pt and pt's spouse in the room.  Patient has dementia so VALENTINA spoke with spouse who stated that she wanted patient to go to the [facility] SNF since they live there at their Independent Living area.  Called Lorin in admissions who confirmed that she received pt's referral yesterday.  Stated that they are not in network with pt's insurance but since he is a resident, they can do a one time contract.  Stated that they only have 1 private room available and that spouse was insistent pt have a private room.  Stated that she was unsure if this private room would be available when pt is admitted.    SW called spouse back who was talking with the hospitalist in pt's room.  Hospitalist reported that pt was ready for discharge now today and was discussing a discharge home w/spouse.  Spouse was agreeable if we were able to set up HHC and a rollater walker for patient.  SW set up HHC w/[facility] and a Rollator w/[facility].    Then got a call back from spouse stating that she doesn't feel she can care for patient at home and is worried about his safety.  Spouse stated she would like to go with the original plan for SNF.    Called Lorin back at [facility] who stated they have accepted patient and she has confirmed with spouse that a shared room is ok with them.  Lorin will start pt's precert tonight and give us an update tomorrow on status.      Discharge Plan:  [facility] pending precert approval.    Electronically signed by LUCINA Sandhu, MELVIN on 2/4/2025 at 5:43 PM        
Instructions: This plan will send the code FBSE to the PM system.  DO NOT or CHANGE the price.
Detail Level: Simple
Price (Do Not Change): 0.00
Detail Level: Generalized

## 2025-02-07 NOTE — PLAN OF CARE
Problem: Safety - Adult  Goal: Free from fall injury  Outcome: Progressing     Problem: Skin/Tissue Integrity  Goal: Skin integrity remains intact  Description: 1.  Monitor for areas of redness and/or skin breakdown  2.  Assess vascular access sites hourly  3.  Every 4-6 hours minimum:  Change oxygen saturation probe site  4.  Every 4-6 hours:  If on nasal continuous positive airway pressure, respiratory therapy assess nares and determine need for appliance change or resting period  Outcome: Progressing     Problem: ABCDS Injury Assessment  Goal: Absence of physical injury  Outcome: Progressing     Problem: Pain  Goal: Verbalizes/displays adequate comfort level or baseline comfort level  Outcome: Progressing

## 2025-02-10 ENCOUNTER — CARE COORDINATION (OUTPATIENT)
Dept: CASE MANAGEMENT | Age: 89
End: 2025-02-10

## 2025-02-10 NOTE — CARE COORDINATION
Care Transitions Note    Initial Call - Call within 2 business days of discharge: Yes    Attempted to reach patient for transitions of care follow up. Unable to reach patient.    Outreach Attempts:   HIPAA compliant voicemail left for patient.     CTN contacted [facility] and spoke with Tiffany who verified HC orders were received and patient had soc on 25.     Patient: Tommy Amato    Patient : 1935   MRN: [id number]    Reason for Admission: severe aortic stenosis   Discharge Date: 25  RURS: Readmission Risk Score: 13.5    Last Discharge Facility       Date Complaint Diagnosis Description Type Department Provider    25  Severe aortic stenosis ... Admission (Discharged) AILIN Paramjit Mcqueen MD            Was this an external facility discharge? No    Follow Up Appointment:   Patient does not have a follow up appointment scheduled at time of call.  Unable to reach   Future Appointments         Provider Specialty Dept Phone    2025 9:45 AM Bartolome Angel MD Cardiology [phone]    3/18/2025 11:00 AM Columba Oviedo MD Primary Care [phone]    3/25/2025 2:00 PM Jun Perea MD Dermatology [phone]    2025 2:00 PM Jun Perea MD Dermatology [phone]            Plan for follow-up on next business day.      Nishi Quan, MSN, RN, [facility]  Care Transition Nurse  [phone] mobile

## 2025-02-11 ENCOUNTER — CARE COORDINATION (OUTPATIENT)
Dept: CASE MANAGEMENT | Age: 89
End: 2025-02-11

## 2025-02-11 ENCOUNTER — TELEPHONE (OUTPATIENT)
Dept: CARDIOLOGY CLINIC | Age: 89
End: 2025-02-11

## 2025-02-11 DIAGNOSIS — I21.4 NSTEMI (NON-ST ELEVATED MYOCARDIAL INFARCTION) (HCC): Primary | ICD-10-CM

## 2025-02-11 PROCEDURE — 1111F DSCHRG MED/CURRENT MED MERGE: CPT | Performed by: FAMILY MEDICINE

## 2025-02-11 NOTE — CARE COORDINATION
Care Transitions Note    Initial Call - Call within 2 business days of discharge: Yes    Patient Current Location:  [place]    Care Transition Nurse contacted the patient by telephone to perform post hospital discharge assessment, verified name and  as identifiers. Provided introduction to self, and explanation of the Care Transition Nurse role.     Patient: Tommy Amato    Patient : 1935   MRN: [id number]    Reason for Admission: LOC   Discharge Date: 25  RURS: Readmission Risk Score: 13.5      Last Discharge Facility       Date Complaint Diagnosis Description Type Department Provider    25  Severe aortic stenosis ... Admission (Discharged) AILIN Paramjit Mcqueen MD            Was this an external facility discharge? No    Additional needs identified to be addressed with provider   No needs identified             Method of communication with provider: none.    Patients top risk factors for readmission: medical condition-.     Interventions to address risk factors:   Education: .  Review of patient management of conditions/medications: .    Care Summary Note: CTN spoke with patient and spouse via speaker phone. Patient reported he is doing alright just having issues with nose bleeds. Patient encouraged to continue to monitor nose bleeds and if they worsen or not able to get them to stop to notify provider or return to ER. Patient reported he has a humidifier he is using at night to help and using Aquaphor inside his nose. CTN reviewed all medications with patient who reported he is taking all as prescribed. [facility] nurse was out on  for soc. Denies any acute needs at present time.  Agreeable to f/u calls.  Educated on the use of urgent care or physician’s 24 hr access line if assistance is needed after hours & that they can always contact their home care provider to request a nurse visit even if it isn't their regularly scheduled day for their nurse to visit.          Care

## 2025-02-11 NOTE — TELEPHONE ENCOUNTER
Faxed dental clearance for TAVR to Dr. Drake Bryan's office \" [phone] w \"success\" Brittany BROWNING

## 2025-02-12 ENCOUNTER — TELEPHONE (OUTPATIENT)
Dept: CARDIOLOGY CLINIC | Age: 89
End: 2025-02-12

## 2025-02-12 NOTE — TELEPHONE ENCOUNTER
Spoke to pts wife, Robina. Aware of TAVR date on 3/4/25 pending dental clearance on 2/24/25 (scheduled w Dr. Irvin DDS). Aware of OV with Dr. Angel on 2/27/25 and will obtain all PAT labs/UA and get TAVR instructions that day. VU of all. Brittany BROWNING

## 2025-02-12 NOTE — TELEPHONE ENCOUNTER
Called patient and wife to discuss. Patient has not been taking valsartan and BP is running  systolic according to home health. Appears from chart review valsartan was marked as \"patient not taking\" on admission. Patient did not appear to take during hospital admission. Advised patient to continue holding valsartan. Will clean up med list. Pt and wife elba

## 2025-02-13 ENCOUNTER — CARE COORDINATION (OUTPATIENT)
Dept: CASE MANAGEMENT | Age: 89
End: 2025-02-13

## 2025-02-13 ENCOUNTER — TELEPHONE (OUTPATIENT)
Age: 89
End: 2025-02-13

## 2025-02-13 ENCOUNTER — TELEPHONE (OUTPATIENT)
Dept: CARDIOLOGY CLINIC | Age: 89
End: 2025-02-13

## 2025-02-13 NOTE — TELEPHONE ENCOUNTER
Kp calling with [facility] letting DCE know pt has gained 3 pounds within 1 day with lower leg swelling pt lungs seems to be clear.   Please call and advise  Thank you

## 2025-02-13 NOTE — TELEPHONE ENCOUNTER
Reviewed with [facility] in DCE absence. C recommended appt with NP. Discussed with pt and wife. They would like to weigh him again tomorrow and see how he is feeling and reassess. He is wearing his compression stockings today and elevating his legs. Denies pain, SOB.

## 2025-02-13 NOTE — TELEPHONE ENCOUNTER
Kp from [facility] calling.     Advising that he is there with Thony doing his visit today.     3lbs weight gain since yesterday. 149lb-152lbs    Legs 1+ pitting edema.     Lungs are clear.     O2 was low at 88% but after having Don breathe got him up to 95%.     BP: 120/65.     Advised Kp to also reach out to Cardio : Bartolome Angel MD  due to Thony's recent hospitalization.           Palak Monet RN MD    2/12/25 11:54 AM  Note     Called patient and wife to discuss. Patient has not been taking valsartan and BP is running  systolic according to home health. Appears from chart review valsartan was marked as \"patient not taking\" on admission. Patient did not appear to take during hospital admission. Advised patient to continue holding valsartan. Will clean up med list. Pt and wife elba

## 2025-02-13 NOTE — TELEPHONE ENCOUNTER
M    2/13/25  2:40 PM  david contacted Duyen Roy Emmalee Ann     EM    2/13/25  2:41 PM  Note     David calling with [facility] letting DCE know pt has gained 3 pounds within 1 day with lower leg swelling pt lungs seems to be clear.   Please call and advise  Thank you              EM    2/13/25  2:41 PM  Duyen Roy routed this conversation to [facility] Cardio Practice Staff       SD    2/13/25  2:44 PM  Ketty Zavala MA routed this conversation to Bartolome Angel MD

## 2025-02-13 NOTE — CARE COORDINATION
Per chart review no HFU apt scheduled with PCP, CTN will send additional request to PCP office.     Nishi Quan, MSN, RN, [facility]  Care Transition Nurse  [phone] mobile

## 2025-02-14 NOTE — TELEPHONE ENCOUNTER
Pt wife called to cancel pt appt with DCE on 2/27/25 as he is now going to a Cardiologist @ [facility]. Wife also states she wants his TAVR cancel, she doesn't want him to be operated on.

## 2025-02-14 NOTE — TELEPHONE ENCOUNTER
Cardiology note reviewed.  Called this AM.  No PND or orthopnea.  Swelling is better this AM.  Has appt with Dr Juarez today for second opinion.

## 2025-02-18 ENCOUNTER — CARE COORDINATION (OUTPATIENT)
Dept: CASE MANAGEMENT | Age: 89
End: 2025-02-18

## 2025-02-18 NOTE — CARE COORDINATION
Care Transitions Note    Follow Up Call     Patient Current Location:  [place]    Care Transition Nurse contacted the patient by telephone. Verified name and  as identifiers.    Additional needs identified to be addressed with provider   No needs identified                 Method of communication with provider: none.    Care Summary Note: CTN spoke with patient who reported he is doing alright. Patient handed the phone to his spouse who reported patient is doing well and he is seeing a cardiologist through [facility]. Patient had apt on  and had some changes to medications and Lasix and potassium were added. Patient is tolerating new medications ok so far and swelling has decreased, patient reported his weight today was 153 lbs. Patient to have repeat blood work in one week. Denies any acute needs at present time.  Agreeable to f/u calls.  Educated on the use of urgent care or physician’s 24 hr access line if assistance is needed after hours.     Plan of care updates since last contact:  Review of patient management of conditions/medications: .       Advance Care Planning:   Does patient have an Advance Directive: reviewed during previous call, see note. .    Medication Review:  Medications changed since last call, reviewed today.     Remote Patient Monitoring:  Offered patient enrollment in the Remote Patient Monitoring (RPM) program for in-home monitoring: discuss up on follow up [facility] cardiologist     Assessments:  Care Transitions Subsequent and Final Call    Subsequent and Final Calls  Do you have any ongoing symptoms?: No  Have your medications changed?: No  Do you have any questions related to your medications?: No  Do you currently have any active services?: No  Do you have any needs or concerns that I can assist you with?: No  Identified Barriers: None  Care Transitions Interventions  Other Interventions:              Follow Up Appointment:   Reviewed upcoming appointment(s).  Future

## 2025-02-25 ENCOUNTER — CARE COORDINATION (OUTPATIENT)
Dept: CASE MANAGEMENT | Age: 89
End: 2025-02-25

## 2025-02-25 NOTE — CARE COORDINATION
Care Transitions Note    Follow Up Call     Attempted to reach patient for transitions of care follow up.  Unable to reach patient.      Outreach Attempts:   HIPAA compliant voicemail left for patient.     Care Summary Note: LVM     Follow Up Appointment:   Future Appointments         Provider Specialty Dept Phone    3/18/2025 11:00 AM Columba Oviedo MD Primary Care [phone]    3/25/2025 2:00 PM Jun Perea MD Dermatology [phone]    9/25/2025 2:00 PM Jun Perea MD Dermatology [phone]            Plan for follow-up call in 6-10 days based on severity of symptoms and risk factors. Plan for next call: self management-.    Nishi Quan, MSN, RN, [facility]  Care Transition Nurse  [phone] mobile

## 2025-03-04 ENCOUNTER — CARE COORDINATION (OUTPATIENT)
Dept: CASE MANAGEMENT | Age: 89
End: 2025-03-04

## 2025-03-04 NOTE — CARE COORDINATION
Care Transitions Note    Final Call     Patient Current Location:  [place]    Care Transition Nurse contacted the patient, spouse/partner  by telephone. Verified name and  as identifiers.    Patient graduated from the Care Transitions program on 3/4/25.    Advance Care Planning:   Does patient have an Advance Directive: reviewed during previous call, see note. .    Handoff:   Condition management for HF .     Care Summary Note: CTN spoke with patient and spouse. Patient reported he is doing alright but still has some fatigue. Patient had apt with Dr. Juarez and has been referred to surgeon to discuss possible TAVR. Patient denied any other issues or concerns.     Assessments:  Care Transitions Subsequent and Final Call    Subsequent and Final Calls  Have your medications changed?: No  Do you have any questions related to your medications?: No  Do you currently have any active services?: No  Do you have any needs or concerns that I can assist you with?: No  Identified Barriers: None  Care Transitions Interventions  Other Interventions:              Upcoming Appointments:    Future Appointments         Provider Specialty Dept Phone    3/18/2025 11:00 AM Columba Oviedo MD Primary Care [phone]    3/25/2025 2:00 PM Jun Perea MD Dermatology [phone]    2025 2:00 PM Jun Perea MD Dermatology [phone]            Patient has agreed to contact primary care provider and/or specialist for any further questions, concerns, or needs.    Nishi Quan, MSN, RN, [facility]  Care Transition Nurse  [phone] mobile

## 2025-03-05 ENCOUNTER — CARE COORDINATION (OUTPATIENT)
Dept: CARE COORDINATION | Age: 89
End: 2025-03-05

## 2025-03-05 NOTE — CARE COORDINATION
Ambulatory Care Coordination Note     3/5/2025 2:28 PM     Patient Current Location:  Home: [address] [place]  [place] [address]     This patient was received as a referral from Care Transition Nurse.    ACM contacted the patient by telephone. Verified name and  with patient as identifiers. Provided introduction to self, and explanation of the ACM role.   Patient respectfully declined care management services at this time.          ACM: Prudence Pennington RN     Challenges to be reviewed by the provider   Additional needs identified to be addressed with provider No  none               Method of communication with provider: none.    Utilization: Initial Call - N/A    Care Summary Note: initiated ACC outreach s/p CTN handoff yesterday. Spoke w/both pt & also pt's wife, at pt's preference. Reviewed ACC program. Pt/pt's wife respectfully decline need or desire to engage in ACC, noting pt/family is proactive in self mgmt for care & follow closely w/PCP, who is Shilpi PINEDA.   AC extended open invitation for pt/family to outreach to this writer, in the future, if ever pt/family would like to engage in ACC program.  Pt's wife accurately recited this writer's contact information.    Offered patient enrollment in the Remote Patient Monitoring (RPM) program for in-home monitoring: Patient is not eligible for RPM program because: declines enrollment to ACC .       PCP/Specialist follow up:   Future Appointments         Provider Specialty Dept Phone    3/18/2025 11:00 AM Columba Oviedo MD Primary Care [phone]    3/25/2025 2:00 PM Jun Perea MD Dermatology [phone]    2025 2:00 PM Jun Perea MD Dermatology [phone]            Follow Up:   No further ACC outreach planned

## 2025-03-12 NOTE — PROGRESS NOTES
(149 lb 4 oz)   01/30/25 74 kg (163 lb 2.3 oz)        Physical Exam  NAD    Skin is warm and dry. No rash. Well hydrated  Alert and oriented x 3.  Mood and affect are normal.  The neck is supple and free of adenopathy or masses, the thyroid is normal without enlargement or nodules. Chest: clear with no wheezes or rales.  No retractions, or use of accessory muscles noted.    Cardiovascular: PMI is not displaced, and no thrill noted.  Regular rate and rhythm with no rub, murmur or gallop.  trace peripheral edema.      Assessment:       Diagnosis Orders   1. Pancreas cyst  Addressed.  MRI in 2 years noted in chart      2. Lung nodule seen on imaging study  CT chest in 3 to 6 months.  Reminder placed in system      3. Severe aortic stenosis  Per cardiology.  Advised to call me or cardiology if pedal edema, dyspnea, cough             Plan:      Side effects of current medications reviewed and questions answered.   Follow up in 3 months or prn.

## 2025-03-18 ENCOUNTER — OFFICE VISIT (OUTPATIENT)
Age: 89
End: 2025-03-18
Payer: MEDICARE

## 2025-03-18 VITALS
DIASTOLIC BLOOD PRESSURE: 58 MMHG | BODY MASS INDEX: 24.94 KG/M2 | WEIGHT: 154.4 LBS | OXYGEN SATURATION: 99 % | RESPIRATION RATE: 16 BRPM | SYSTOLIC BLOOD PRESSURE: 112 MMHG | TEMPERATURE: 97.5 F | HEART RATE: 82 BPM

## 2025-03-18 DIAGNOSIS — R91.1 LUNG NODULE SEEN ON IMAGING STUDY: ICD-10-CM

## 2025-03-18 DIAGNOSIS — K86.2 PANCREAS CYST: Primary | ICD-10-CM

## 2025-03-18 DIAGNOSIS — I35.0 SEVERE AORTIC STENOSIS: ICD-10-CM

## 2025-03-18 PROCEDURE — 1123F ACP DISCUSS/DSCN MKR DOCD: CPT | Performed by: FAMILY MEDICINE

## 2025-03-18 PROCEDURE — G2211 COMPLEX E/M VISIT ADD ON: HCPCS | Performed by: FAMILY MEDICINE

## 2025-03-18 PROCEDURE — 99214 OFFICE O/P EST MOD 30 MIN: CPT | Performed by: FAMILY MEDICINE

## 2025-03-18 PROCEDURE — 1159F MED LIST DOCD IN RCRD: CPT | Performed by: FAMILY MEDICINE

## 2025-03-18 PROCEDURE — 1160F RVW MEDS BY RX/DR IN RCRD: CPT | Performed by: FAMILY MEDICINE

## 2025-03-18 RX ORDER — VALSARTAN 80 MG/1
80 TABLET ORAL DAILY
COMMUNITY
Start: 2025-02-28

## 2025-03-18 RX ORDER — METOPROLOL SUCCINATE 25 MG/1
25 TABLET, EXTENDED RELEASE ORAL DAILY
COMMUNITY
Start: 2025-02-14

## 2025-03-18 RX ORDER — FUROSEMIDE 40 MG/1
40 TABLET ORAL DAILY
COMMUNITY
Start: 2025-02-14

## 2025-03-18 RX ORDER — POTASSIUM CHLORIDE 750 MG/1
10 TABLET, EXTENDED RELEASE ORAL DAILY
COMMUNITY
Start: 2025-02-14

## 2025-03-20 NOTE — TELEPHONE ENCOUNTER
Requested Prescriptions     Pending Prescriptions Disp Refills    mirabegron (MYRBETRIQ) 50 MG TB24 90 tablet 3     Sig: TAKE 1 TABLET DAILY      Last Visit: 3/18/25    Last Labs: 2/24/25    Next Visit: 6/20/25

## 2025-03-21 RX ORDER — MIRABEGRON 50 MG/1
TABLET, FILM COATED, EXTENDED RELEASE ORAL
Qty: 90 TABLET | Refills: 3 | Status: SHIPPED | OUTPATIENT
Start: 2025-03-21

## 2025-03-25 ENCOUNTER — OFFICE VISIT (OUTPATIENT)
Age: 89
End: 2025-03-25

## 2025-03-25 DIAGNOSIS — B35.1 ONYCHOMYCOSIS: ICD-10-CM

## 2025-03-25 DIAGNOSIS — L57.0 ACTINIC KERATOSIS: Primary | ICD-10-CM

## 2025-03-25 DIAGNOSIS — Z85.828 HISTORY OF BASAL CELL CARCINOMA: ICD-10-CM

## 2025-03-25 NOTE — PROGRESS NOTES
[facility]  Jun Perea MD  [phone]      Tommy Amato  11/3/1935    89 y.o. male     Date of Visit: 3/25/2025    Chief Complaint: skin lesions    History of Present Illness:    1.  He presents today for couple of persistent scaly lesions on the upper forehead.    2.  He also complains of chronic asymptomatic nail changes on both big toenails and also on the left middle toe.    3.  He has a history of basal cell carcinomas, most recently on the right superior parietal scalp status post Mohs by Dr. Santos.    BCC on the right superior parietal scalp-treated with Mohs by Dr. Santos on 1/13/2025.  Pigmented nodular BCC on the left anterior temporal scalp-treated with Mohs by Dr. Santos on 5/23/2024.  Well differentiated SCC of the right forearm-treated with electrodesiccation and curettage on 3/10/2023.  Nodular BCC on the mid vertex scalp-treated with Mohs by Dr. Santos on 3/9/2022.    Reports that he will need a TAVR.      Review of Systems:  Gen: Feels well, good sense of health.      Past Medical History, Family History, Surgical History, Medications and Allergies reviewed.    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

## 2025-03-31 RX ORDER — MIRABEGRON 50 MG/1
TABLET, FILM COATED, EXTENDED RELEASE ORAL
Qty: 90 TABLET | Refills: 3 | Status: SHIPPED | OUTPATIENT
Start: 2025-03-31

## 2025-03-31 NOTE — TELEPHONE ENCOUNTER
Requested Prescriptions     Pending Prescriptions Disp Refills    mirabegron (MYRBETRIQ) 50 MG TB24 90 tablet 3     Sig: TAKE 1 TABLET DAILY         Last OV: 3/18/2025    Last labs: 2/24/2025     F/u: 6/20/2025

## 2025-04-15 RX ORDER — ATORVASTATIN CALCIUM 80 MG/1
80 TABLET, FILM COATED ORAL NIGHTLY
Qty: 30 TABLET | Refills: 3 | Status: CANCELLED | OUTPATIENT
Start: 2025-04-15

## 2025-04-15 NOTE — TELEPHONE ENCOUNTER
atorvastatin (LIPITOR) 80 MG tablet     LOV: 3/18/25    LABS: 2/24/25    F/U: 6/20/25    Send script to [facility]   [phone]

## 2025-04-24 NOTE — TELEPHONE ENCOUNTER
Requested Prescriptions     Pending Prescriptions Disp Refills    valsartan (DIOVAN) 80 MG tablet [Pharmacy Med Name: VALSARTAN TABS 80MG]  0      Last Visit: 1/22/25    Last Labs: 2/24/25    Next Visit: 6/20/25    Spoke with pt/wife to verify that they did want to use [facility] for this medication, they do. They will not be using [facility] any longer.

## 2025-04-25 RX ORDER — VALSARTAN 80 MG/1
80 TABLET ORAL DAILY
Qty: 90 TABLET | Refills: 3 | Status: SHIPPED | OUTPATIENT
Start: 2025-04-25

## 2025-05-12 RX ORDER — MIRABEGRON 50 MG/1
50 TABLET, FILM COATED, EXTENDED RELEASE ORAL DAILY
Qty: 90 TABLET | Refills: 3 | Status: SHIPPED | OUTPATIENT
Start: 2025-05-12

## 2025-05-12 NOTE — TELEPHONE ENCOUNTER
Requested Prescriptions     Pending Prescriptions Disp Refills    mirabegron (MYRBETRIQ) 50 MG TB24 [Pharmacy Med Name: MIRABEGRON ER TABS 50MG]  0         Last OV: 3/18/2025    Last labs: 5/8/2025     F/u: 6/20/2025
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
## 2025-06-16 RX ORDER — VALSARTAN 80 MG/1
80 TABLET ORAL DAILY
Qty: 90 TABLET | Refills: 3 | Status: SHIPPED | OUTPATIENT
Start: 2025-06-16

## 2025-06-17 ENCOUNTER — OFFICE VISIT (OUTPATIENT)
Age: 89
End: 2025-06-17
Payer: MEDICARE

## 2025-06-17 VITALS
HEART RATE: 64 BPM | OXYGEN SATURATION: 97 % | DIASTOLIC BLOOD PRESSURE: 60 MMHG | BODY MASS INDEX: 26.68 KG/M2 | SYSTOLIC BLOOD PRESSURE: 110 MMHG | TEMPERATURE: 97.7 F | RESPIRATION RATE: 16 BRPM | WEIGHT: 165.2 LBS

## 2025-06-17 DIAGNOSIS — S50.319A ABRASION, ELBOW W/O INFECTION: ICD-10-CM

## 2025-06-17 DIAGNOSIS — R41.3 MEMORY LOSS: Primary | ICD-10-CM

## 2025-06-17 DIAGNOSIS — Z91.81 AT HIGH RISK FOR FALLS: ICD-10-CM

## 2025-06-17 PROCEDURE — 1160F RVW MEDS BY RX/DR IN RCRD: CPT | Performed by: FAMILY MEDICINE

## 2025-06-17 PROCEDURE — 99214 OFFICE O/P EST MOD 30 MIN: CPT | Performed by: FAMILY MEDICINE

## 2025-06-17 PROCEDURE — 1123F ACP DISCUSS/DSCN MKR DOCD: CPT | Performed by: FAMILY MEDICINE

## 2025-06-17 PROCEDURE — 1159F MED LIST DOCD IN RCRD: CPT | Performed by: FAMILY MEDICINE

## 2025-06-18 ENCOUNTER — TELEPHONE (OUTPATIENT)
Age: 89
End: 2025-06-18

## 2025-06-18 DIAGNOSIS — Z95.2 S/P TAVR (TRANSCATHETER AORTIC VALVE REPLACEMENT): Primary | ICD-10-CM

## 2025-06-18 DIAGNOSIS — I25.10 CAD IN NATIVE ARTERY: ICD-10-CM

## 2025-06-18 DIAGNOSIS — Z86.718 HISTORY OF DVT (DEEP VEIN THROMBOSIS): ICD-10-CM

## 2025-06-18 DIAGNOSIS — I50.20 HFREF (HEART FAILURE WITH REDUCED EJECTION FRACTION) (HCC): ICD-10-CM

## 2025-06-18 PROBLEM — R01.1 SYSTOLIC EJECTION MURMUR: Status: RESOLVED | Noted: 2024-12-23 | Resolved: 2025-06-18

## 2025-06-18 PROBLEM — I35.0 SEVERE AORTIC STENOSIS: Status: RESOLVED | Noted: 2025-01-30 | Resolved: 2025-06-18

## 2025-06-18 PROBLEM — I82.409 ACUTE DVT (DEEP VENOUS THROMBOSIS) (HCC): Status: RESOLVED | Noted: 2025-01-31 | Resolved: 2025-06-18

## 2025-06-18 PROBLEM — R57.0 CARDIOGENIC SHOCK (HCC): Status: RESOLVED | Noted: 2025-01-30 | Resolved: 2025-06-18

## 2025-06-18 PROBLEM — I21.4 NSTEMI (NON-ST ELEVATED MYOCARDIAL INFARCTION) (HCC): Status: RESOLVED | Noted: 2025-01-31 | Resolved: 2025-06-18

## 2025-06-18 NOTE — TELEPHONE ENCOUNTER
Patients spouse called   Needs referral to [facility] for Cardiac Rehab   Previous referral closed 2/03/2025

## 2025-06-25 ENCOUNTER — HOSPITAL ENCOUNTER (OUTPATIENT)
Dept: MRI IMAGING | Age: 89
Discharge: HOME | End: 2025-06-25
Payer: MEDICARE

## 2025-06-25 DIAGNOSIS — R41.3 MEMORY LOSS: ICD-10-CM

## 2025-06-25 PROCEDURE — 6360000004 HC RX CONTRAST MEDICATION: Performed by: FAMILY MEDICINE

## 2025-06-25 PROCEDURE — A9576 INJ PROHANCE MULTIPACK: HCPCS | Performed by: FAMILY MEDICINE

## 2025-06-25 PROCEDURE — 70553 MRI BRAIN STEM W/O & W/DYE: CPT

## 2025-06-25 RX ORDER — GADOTERIDOL 279.3 MG/ML
15 INJECTION INTRAVENOUS
Status: COMPLETED | OUTPATIENT
Start: 2025-06-25 | End: 2025-06-25

## 2025-06-25 RX ADMIN — GADOTERIDOL 15 ML: 279.3 INJECTION, SOLUTION INTRAVENOUS at 15:47

## 2025-06-26 ENCOUNTER — RESULTS FOLLOW-UP (OUTPATIENT)
Age: 89
End: 2025-06-26

## 2025-06-27 ENCOUNTER — HOSPITAL ENCOUNTER (OUTPATIENT)
Dept: CARDIAC REHAB | Age: 89
Setting detail: THERAPIES SERIES
Discharge: HOME | End: 2025-06-27
Payer: MEDICARE

## 2025-06-27 ENCOUNTER — TELEPHONE (OUTPATIENT)
Age: 89
End: 2025-06-27

## 2025-06-27 PROCEDURE — 93798 PHYS/QHP OP CAR RHAB W/ECG: CPT

## 2025-06-30 ENCOUNTER — HOSPITAL ENCOUNTER (OUTPATIENT)
Dept: CARDIAC REHAB | Age: 89
Setting detail: THERAPIES SERIES
Discharge: HOME | End: 2025-06-30
Payer: MEDICARE

## 2025-06-30 PROCEDURE — 93798 PHYS/QHP OP CAR RHAB W/ECG: CPT

## 2025-07-02 ENCOUNTER — HOSPITAL ENCOUNTER (OUTPATIENT)
Dept: CARDIAC REHAB | Age: 89
Setting detail: THERAPIES SERIES
Discharge: HOME | End: 2025-07-02
Payer: MEDICARE

## 2025-07-02 PROCEDURE — 93798 PHYS/QHP OP CAR RHAB W/ECG: CPT

## 2025-07-07 ENCOUNTER — APPOINTMENT (OUTPATIENT)
Dept: CARDIAC REHAB | Age: 89
End: 2025-07-07
Payer: MEDICARE

## 2025-07-09 ENCOUNTER — HOSPITAL ENCOUNTER (OUTPATIENT)
Dept: CARDIAC REHAB | Age: 89
Setting detail: THERAPIES SERIES
Discharge: HOME | End: 2025-07-09
Payer: MEDICARE

## 2025-07-09 PROCEDURE — 93798 PHYS/QHP OP CAR RHAB W/ECG: CPT

## 2025-07-10 ENCOUNTER — TELEPHONE (OUTPATIENT)
Age: 89
End: 2025-07-10

## 2025-07-10 NOTE — TELEPHONE ENCOUNTER
From Wifes radha   Can you possibly get the results of Don’s blood test with the neurologist Dr Scott? I’ve called and left messages three times and they will not answer me.  The test were to determine whether it was vascular, dementia, or Alzheimer’s. That’s [facility].  We got the results and it’s early Alzheimer’s    Will request records from [facility].

## 2025-07-11 ENCOUNTER — HOSPITAL ENCOUNTER (OUTPATIENT)
Dept: CARDIAC REHAB | Age: 89
Setting detail: THERAPIES SERIES
Discharge: HOME | End: 2025-07-11
Payer: MEDICARE

## 2025-07-11 PROCEDURE — 93798 PHYS/QHP OP CAR RHAB W/ECG: CPT

## 2025-07-11 NOTE — TELEPHONE ENCOUNTER
Called and discussed.  OK to take Aricept.  Side effects of current medications reviewed and questions answered.

## 2025-07-14 ENCOUNTER — HOSPITAL ENCOUNTER (OUTPATIENT)
Dept: CARDIAC REHAB | Age: 89
Setting detail: THERAPIES SERIES
Discharge: HOME | End: 2025-07-14
Payer: MEDICARE

## 2025-07-14 PROCEDURE — 93798 PHYS/QHP OP CAR RHAB W/ECG: CPT

## 2025-07-16 ENCOUNTER — HOSPITAL ENCOUNTER (OUTPATIENT)
Dept: CARDIAC REHAB | Age: 89
Setting detail: THERAPIES SERIES
Discharge: HOME | End: 2025-07-16
Payer: MEDICARE

## 2025-07-16 PROCEDURE — 93798 PHYS/QHP OP CAR RHAB W/ECG: CPT

## 2025-07-18 ENCOUNTER — HOSPITAL ENCOUNTER (OUTPATIENT)
Dept: CARDIAC REHAB | Age: 89
Setting detail: THERAPIES SERIES
Discharge: HOME | End: 2025-07-18
Payer: MEDICARE

## 2025-07-18 PROCEDURE — 93798 PHYS/QHP OP CAR RHAB W/ECG: CPT

## 2025-07-21 ENCOUNTER — APPOINTMENT (OUTPATIENT)
Dept: CARDIAC REHAB | Age: 89
End: 2025-07-21
Payer: MEDICARE

## 2025-07-23 ENCOUNTER — HOSPITAL ENCOUNTER (OUTPATIENT)
Dept: CARDIAC REHAB | Age: 89
Setting detail: THERAPIES SERIES
Discharge: HOME | End: 2025-07-23
Payer: MEDICARE

## 2025-07-23 PROCEDURE — 93798 PHYS/QHP OP CAR RHAB W/ECG: CPT

## 2025-07-25 ENCOUNTER — HOSPITAL ENCOUNTER (OUTPATIENT)
Dept: CARDIAC REHAB | Age: 89
Setting detail: THERAPIES SERIES
Discharge: HOME | End: 2025-07-25
Payer: MEDICARE

## 2025-07-25 PROCEDURE — 93798 PHYS/QHP OP CAR RHAB W/ECG: CPT

## 2025-07-28 ENCOUNTER — HOSPITAL ENCOUNTER (OUTPATIENT)
Dept: CARDIAC REHAB | Age: 89
Setting detail: THERAPIES SERIES
Discharge: HOME | End: 2025-07-28
Payer: MEDICARE

## 2025-07-28 PROCEDURE — 93798 PHYS/QHP OP CAR RHAB W/ECG: CPT

## 2025-07-30 ENCOUNTER — HOSPITAL ENCOUNTER (OUTPATIENT)
Dept: CARDIAC REHAB | Age: 89
Setting detail: THERAPIES SERIES
Discharge: HOME | End: 2025-07-30
Payer: MEDICARE

## 2025-07-30 PROCEDURE — 93798 PHYS/QHP OP CAR RHAB W/ECG: CPT

## 2025-08-01 ENCOUNTER — HOSPITAL ENCOUNTER (OUTPATIENT)
Dept: CARDIAC REHAB | Age: 89
Setting detail: THERAPIES SERIES
Discharge: HOME | End: 2025-08-01
Payer: MEDICARE

## 2025-08-01 ENCOUNTER — PATIENT MESSAGE (OUTPATIENT)
Age: 89
End: 2025-08-01

## 2025-08-01 PROCEDURE — 93798 PHYS/QHP OP CAR RHAB W/ECG: CPT

## 2025-08-01 RX ORDER — METOPROLOL SUCCINATE 25 MG/1
25 TABLET, EXTENDED RELEASE ORAL DAILY
Qty: 90 TABLET | Refills: 0 | Status: SHIPPED | OUTPATIENT
Start: 2025-08-01

## 2025-08-01 NOTE — TELEPHONE ENCOUNTER
Requested Prescriptions     Pending Prescriptions Disp Refills    metoprolol succinate (TOPROL XL) 25 MG extended release tablet 90 tablet 0     Sig: Take 1 tablet by mouth daily         Last OV: 6/17/2025    Last labs: 5/8/2025     F/u: Visit date not found

## 2025-08-04 ENCOUNTER — HOSPITAL ENCOUNTER (OUTPATIENT)
Dept: CARDIAC REHAB | Age: 89
Setting detail: THERAPIES SERIES
Discharge: HOME | End: 2025-08-04
Payer: MEDICARE

## 2025-08-04 PROCEDURE — 93798 PHYS/QHP OP CAR RHAB W/ECG: CPT

## 2025-08-06 ENCOUNTER — HOSPITAL ENCOUNTER (OUTPATIENT)
Dept: CARDIAC REHAB | Age: 89
Setting detail: THERAPIES SERIES
Discharge: HOME | End: 2025-08-06
Payer: MEDICARE

## 2025-08-06 PROCEDURE — 93798 PHYS/QHP OP CAR RHAB W/ECG: CPT

## 2025-08-08 ENCOUNTER — HOSPITAL ENCOUNTER (OUTPATIENT)
Dept: CARDIAC REHAB | Age: 89
Setting detail: THERAPIES SERIES
Discharge: HOME | End: 2025-08-08
Payer: MEDICARE

## 2025-08-08 PROCEDURE — 93798 PHYS/QHP OP CAR RHAB W/ECG: CPT

## 2025-08-11 ENCOUNTER — HOSPITAL ENCOUNTER (OUTPATIENT)
Dept: CARDIAC REHAB | Age: 89
Setting detail: THERAPIES SERIES
Discharge: HOME | End: 2025-08-11
Payer: MEDICARE

## 2025-08-11 PROCEDURE — 93798 PHYS/QHP OP CAR RHAB W/ECG: CPT

## 2025-08-13 ENCOUNTER — HOSPITAL ENCOUNTER (OUTPATIENT)
Dept: CARDIAC REHAB | Age: 89
Setting detail: THERAPIES SERIES
Discharge: HOME | End: 2025-08-13
Payer: MEDICARE

## 2025-08-13 PROCEDURE — 93798 PHYS/QHP OP CAR RHAB W/ECG: CPT

## 2025-08-15 ENCOUNTER — HOSPITAL ENCOUNTER (OUTPATIENT)
Dept: CARDIAC REHAB | Age: 89
Setting detail: THERAPIES SERIES
Discharge: HOME | End: 2025-08-15
Payer: MEDICARE

## 2025-08-15 PROCEDURE — 93798 PHYS/QHP OP CAR RHAB W/ECG: CPT

## 2025-08-18 ENCOUNTER — APPOINTMENT (OUTPATIENT)
Dept: CARDIAC REHAB | Age: 89
End: 2025-08-18
Payer: MEDICARE

## 2025-08-20 ENCOUNTER — HOSPITAL ENCOUNTER (OUTPATIENT)
Dept: CARDIAC REHAB | Age: 89
Setting detail: THERAPIES SERIES
Discharge: HOME | End: 2025-08-20
Payer: MEDICARE

## 2025-08-20 PROCEDURE — 93798 PHYS/QHP OP CAR RHAB W/ECG: CPT

## 2025-08-22 ENCOUNTER — HOSPITAL ENCOUNTER (OUTPATIENT)
Dept: CARDIAC REHAB | Age: 89
Setting detail: THERAPIES SERIES
Discharge: HOME | End: 2025-08-22
Payer: MEDICARE

## 2025-08-22 PROCEDURE — 93798 PHYS/QHP OP CAR RHAB W/ECG: CPT

## 2025-08-25 ENCOUNTER — HOSPITAL ENCOUNTER (OUTPATIENT)
Dept: CARDIAC REHAB | Age: 89
Setting detail: THERAPIES SERIES
Discharge: HOME | End: 2025-08-25
Payer: MEDICARE

## 2025-08-25 PROCEDURE — 93798 PHYS/QHP OP CAR RHAB W/ECG: CPT

## 2025-08-27 ENCOUNTER — HOSPITAL ENCOUNTER (OUTPATIENT)
Dept: CARDIAC REHAB | Age: 89
Setting detail: THERAPIES SERIES
Discharge: HOME | End: 2025-08-27
Payer: MEDICARE

## 2025-08-27 PROCEDURE — 93798 PHYS/QHP OP CAR RHAB W/ECG: CPT

## (undated) DEVICE — PINNACLE INTRODUCER SHEATH: Brand: PINNACLE

## (undated) DEVICE — PERCLOSE™ PROSTYLE™ SUTURE-MEDIATED CLOSURE AND REPAIR SYSTEM: Brand: PERCLOSE™ PROSTYLE™

## (undated) DEVICE — CATH BLLN ANGIO 3.50X15MM NC EUPHORA RX

## (undated) DEVICE — CATHETER DIAG 5FR L100CM LUMN ID0.047IN JL3.5 CRV 0 SIDE H

## (undated) DEVICE — CATH LAB PACK: Brand: MEDLINE INDUSTRIES, INC.

## (undated) DEVICE — GLIDESHEATH SLENDER STAINLESS STEEL KIT: Brand: GLIDESHEATH SLENDER

## (undated) DEVICE — CATHETER DIAG 5FR L100CM LUMN ID0.047IN JR4 CRV 0 SIDE H

## (undated) DEVICE — Device: Brand: NOMOLINE™ LH ADULT NASAL CO2 CANNULA WITH O2 4M

## (undated) DEVICE — RUNTHROUGH NS EXTRA FLOPPY PTCA GUIDEWIRE: Brand: RUNTHROUGH

## (undated) DEVICE — GUIDEWIRE VASC L80CM DIA0.018IN TIP L5CM 15DEG ANG NIT

## (undated) DEVICE — CATH BLLN ANGIO 3X15MM SC EUPHORA RX

## (undated) DEVICE — FIXED CORE WIRE GUIDE STRAIGHT: Brand: COOK

## (undated) DEVICE — CATH BLLN ANGIO 3X20MM NC EUPHORIA RX

## (undated) DEVICE — TR BAND RADIAL ARTERY COMPRESSION DEVICE: Brand: TR BAND

## (undated) DEVICE — PAD, DEFIB, ADULT, RADIOTRANS, PHYSIO: Brand: MEDLINE

## (undated) DEVICE — DRESSING QUIKCLOT FEMORAL INTERVENTIONAL 1.5X1.5 IN

## (undated) DEVICE — GUIDEWIRE VASC L260CM DIA0.035IN RAD 3MM J TIP L7CM PTFE

## (undated) DEVICE — SWAN-GANZ THERMODILUTION CATHETER: Brand: SWAN-GANZ

## (undated) DEVICE — Device

## (undated) DEVICE — CATHETER GUIDE AD 7FR L100CM COR PERIPH ORNG NYL PTFE XB L

## (undated) DEVICE — KIT AT-X65 ANGIOTOUCH HAND CONTROLLER

## (undated) DEVICE — CATHETER DIAG 5FR L100CM AL AL 1.0 CRV SZ DBL BRAID WIRE

## (undated) DEVICE — CATHETER DUAL LUMEN LANGSTON 6F L110CM GWIRE 0.038IN 1000PSI

## (undated) DEVICE — GUIDEWIRE VASC L150CM DIA0.035IN FLX END L7CM J 3MM PTFE

## (undated) DEVICE — Device: Brand: ASAHI SION BLUE

## (undated) DEVICE — PINNACLE R/O II INTRODUCER SHEATH WITH RADIOPAQUE MARKER: Brand: PINNACLE

## (undated) DEVICE — DRAPE,ANGIO,BRACH,STERILE,38X44: Brand: MEDLINE

## (undated) DEVICE — CATH BLLN ANGIO 2.50X25MM SC EUPHORA RX